# Patient Record
Sex: MALE | Race: WHITE | Employment: OTHER | URBAN - METROPOLITAN AREA
[De-identification: names, ages, dates, MRNs, and addresses within clinical notes are randomized per-mention and may not be internally consistent; named-entity substitution may affect disease eponyms.]

---

## 2017-02-16 PROBLEM — H35.3290 MACULAR DEGENERATION, AGE RELATED, EXUDATIVE (HCC): Status: ACTIVE | Noted: 2017-02-16

## 2017-03-16 PROBLEM — I48.91 ATRIAL FIBRILLATION (HCC): Status: ACTIVE | Noted: 2017-03-16

## 2017-03-16 PROBLEM — M17.11 ARTHRITIS OF RIGHT KNEE: Status: ACTIVE | Noted: 2017-03-16

## 2017-03-16 PROBLEM — I50.42 CHRONIC COMBINED SYSTOLIC AND DIASTOLIC CONGESTIVE HEART FAILURE (HCC): Status: ACTIVE | Noted: 2017-03-16

## 2018-03-20 ENCOUNTER — HOSPITAL ENCOUNTER (OUTPATIENT)
Age: 83
Discharge: HOME | End: 2018-03-22
Payer: MEDICARE

## 2018-03-20 ENCOUNTER — OFFICE VISIT (OUTPATIENT)
Dept: CARDIOLOGY CLINIC | Age: 83
End: 2018-03-20
Payer: MEDICARE

## 2018-03-20 VITALS
HEART RATE: 67 BPM | WEIGHT: 190 LBS | DIASTOLIC BLOOD PRESSURE: 70 MMHG | SYSTOLIC BLOOD PRESSURE: 110 MMHG | RESPIRATION RATE: 12 BRPM | HEIGHT: 69 IN | BODY MASS INDEX: 28.14 KG/M2

## 2018-03-20 DIAGNOSIS — R06.02 SHORTNESS OF BREATH: ICD-10-CM

## 2018-03-20 DIAGNOSIS — I10 ESSENTIAL HYPERTENSION: Primary | Chronic | ICD-10-CM

## 2018-03-20 LAB — PRO-BNP: 1143 PG/ML (ref 0–450)

## 2018-03-20 PROCEDURE — 4040F PNEUMOC VAC/ADMIN/RCVD: CPT | Performed by: INTERNAL MEDICINE

## 2018-03-20 PROCEDURE — G8417 CALC BMI ABV UP PARAM F/U: HCPCS | Performed by: INTERNAL MEDICINE

## 2018-03-20 PROCEDURE — G8484 FLU IMMUNIZE NO ADMIN: HCPCS | Performed by: INTERNAL MEDICINE

## 2018-03-20 PROCEDURE — 99213 OFFICE O/P EST LOW 20 MIN: CPT | Performed by: INTERNAL MEDICINE

## 2018-03-20 PROCEDURE — G8427 DOCREV CUR MEDS BY ELIG CLIN: HCPCS | Performed by: INTERNAL MEDICINE

## 2018-03-20 PROCEDURE — 83880 ASSAY OF NATRIURETIC PEPTIDE: CPT

## 2018-03-20 PROCEDURE — 93000 ELECTROCARDIOGRAM COMPLETE: CPT | Performed by: INTERNAL MEDICINE

## 2018-03-20 PROCEDURE — 1036F TOBACCO NON-USER: CPT | Performed by: INTERNAL MEDICINE

## 2018-03-20 PROCEDURE — G8598 ASA/ANTIPLAT THER USED: HCPCS | Performed by: INTERNAL MEDICINE

## 2018-03-20 PROCEDURE — 1123F ACP DISCUSS/DSCN MKR DOCD: CPT | Performed by: INTERNAL MEDICINE

## 2018-03-20 NOTE — PROGRESS NOTES
20%. AF. Biatrial dilatation. Mild PHTN. 18. PRO-BNP 10/26/2016 1265. 19. Cardiac OP follow up 11/22/2016. /60. Symptomatic improvement. 20. Echocardiogram, 01/23/2017, EF biplane = 44%.  Diffuse hypokinesis.  Diastolic dysfunction.  Atrial fibrillation.    21. Echocardiogram, 08/08/2017. 22. Labs, 08/16/2017: Sodium and potassium normal. BUN 19, creatinine 1.1. Pro BNP 1271. 23. Labs 11/13/17: BUN 23 creatinine 1.2. GFR 58.  24. OP cardiac assessment 12/01/17: Asymptomatic. No edema. 25. Device interrogation, [facility], 12/2017. Battery status normal and shows no significant depletion. Counters since 09/22/2017 6 NSVT noted. Sensing appropriate. Lead impedance trends normal.  V pacing 3%. Continue 3 month remotes and yearly in-clinic visit. Review of Systems:  Constitutional: negative for fever and chills  Respiratory: negative for cough and hemoptysis  Cardiovascular:   Gastrointestinal: negative for abdominal pain, diarrhea, nausea and vomiting  Genitourinary:negative for dysuria and hematuria  Derm: negative for rash and skin lesion(s)  Neurological: negative for seizures and tremors  Endocrine: negative for diabetic symptoms including polydipsia and polyuria  Musculoskeletal: negative for CTD  Psychiatric: negative for psychosis and major depression    On examination, he is an elderly, alert, pleasant man in no distress. Skin is warm and dry. Respirations are unlabored. /70   Pulse 67   Resp 12   Ht 5' 9\" (1.753 m)   Wt 190 lb (86.2 kg)   BMI 28.06 kg/m² . HEENT negative for scleral icterus. Extraocular muscles intact. No facial asymmetry or central cyanosis. Neck without masses or goiter. No bruit or JVD. Cardiac apex not displaced. Rhythm regular. Abdomen normal.  Extremities without edema. Lungs are clear. EKG today shows atrial fibrillation. Ventricular response 67/m. Diffuse low QRS voltage. Old inferior MI. Incomplete RBBB.     The patient denies any cardiac symptoms and states that he has not limited his physical activity. Blood pressure today is borderline low for upward titration of sacubitril/valsartan. A pro NP will be checked and the decision made regarding titration thereafter. At completion of today's visit, medications include the following:    Current Outpatient Prescriptions:     ranolazine (RANEXA) 500 MG extended release tablet, Take 1 tablet by mouth 2 times daily, Disp: 180 tablet, Rfl: 3    sacubitril-valsartan (ENTRESTO) 49-51 MG per tablet, Take 1 tablet by mouth 2 times daily, Disp: 180 tablet, Rfl: 3    carvedilol (COREG CR) 80 MG CP24 extended release capsule, TAKE 1 CAPSULE DAILY, Disp: 90 capsule, Rfl: 3    SITagliptin (JANUVIA) 100 MG tablet, Take 1 tablet by mouth daily, Disp: 90 tablet, Rfl: 3    warfarin (COUMADIN) 5 MG tablet, TAKE 1 TO 2 TABLETS DAILY AS DIRECTED, Disp: 150 tablet, Rfl: 2    atorvastatin (LIPITOR) 20 MG tablet, Take 1 tablet by mouth daily, Disp: 90 tablet, Rfl: 2    digoxin (LANOXIN) 125 MCG tablet, Take 1 tablet by mouth daily, Disp: 90 tablet, Rfl: 2    furosemide (LASIX) 40 MG tablet, Take 1 tablet by mouth daily (Patient taking differently: Take 40 mg by mouth daily Indications: pt instructed by cardiologist to take 2 tablest on T,Sat ), Disp: 90 tablet, Rfl: 3    potassium chloride (KLOR-CON M20) 20 MEQ extended release tablet, Take 1 tablet by mouth daily, Disp: 90 tablet, Rfl: 2    NONFORMULARY, 1 tablet daily FOCUS VITAMIN, Disp: , Rfl:     vitamin D3 (CHOLECALCIFEROL) 1000 UNITS TABS tablet, Take 1 tablet by mouth daily. (Patient taking differently: Take 2,000 Units by mouth daily ), Disp: 30 tablet, Rfl: 0    Robert R. Nyla Najjar, MD

## 2018-03-30 ENCOUNTER — ANTI-COAG VISIT (OUTPATIENT)
Dept: FAMILY MEDICINE CLINIC | Age: 83
End: 2018-03-30

## 2018-03-30 ENCOUNTER — NURSE ONLY (OUTPATIENT)
Dept: FAMILY MEDICINE CLINIC | Age: 83
End: 2018-03-30
Payer: MEDICARE

## 2018-03-30 ENCOUNTER — HOSPITAL ENCOUNTER (OUTPATIENT)
Age: 83
Discharge: HOME | End: 2018-03-30
Payer: MEDICARE

## 2018-03-30 DIAGNOSIS — I48.91 ATRIAL FIBRILLATION, UNSPECIFIED TYPE (HCC): ICD-10-CM

## 2018-03-30 DIAGNOSIS — I25.10 CORONARY ARTERY DISEASE INVOLVING NATIVE CORONARY ARTERY OF NATIVE HEART WITHOUT ANGINA PECTORIS: Chronic | ICD-10-CM

## 2018-03-30 DIAGNOSIS — Z79.899 HIGH RISK MEDICATION USE: ICD-10-CM

## 2018-03-30 DIAGNOSIS — I48.91 ATRIAL FIBRILLATION, UNSPECIFIED TYPE (HCC): Chronic | ICD-10-CM

## 2018-03-30 DIAGNOSIS — I48.91 ATRIAL FIBRILLATION, UNSPECIFIED TYPE (HCC): Primary | Chronic | ICD-10-CM

## 2018-03-30 DIAGNOSIS — M35.3 POLYMYALGIA RHEUMATICA SYNDROME (HCC): ICD-10-CM

## 2018-03-30 DIAGNOSIS — E80.6 HYPERBILIRUBINEMIA: ICD-10-CM

## 2018-03-30 DIAGNOSIS — I10 ESSENTIAL HYPERTENSION: Chronic | ICD-10-CM

## 2018-03-30 LAB
ALBUMIN SERPL-MCNC: 4.2 G/DL (ref 3.5–5.2)
ALP BLD-CCNC: 55 U/L (ref 40–129)
ALT SERPL-CCNC: 11 U/L (ref 0–40)
AST SERPL-CCNC: 11 U/L (ref 0–39)
BASOPHILS ABSOLUTE: 0.01 E9/L (ref 0–0.2)
BASOPHILS RELATIVE PERCENT: 0.2 % (ref 0–2)
BILIRUB SERPL-MCNC: 1.7 MG/DL (ref 0–1.2)
BILIRUBIN DIRECT: 0.4 MG/DL (ref 0–0.3)
BILIRUBIN, INDIRECT: 1.3 MG/DL (ref 0–1)
C-REACTIVE PROTEIN: 0.2 MG/DL (ref 0–0.4)
CHOLESTEROL, TOTAL: 100 MG/DL (ref 0–199)
DIGOXIN LEVEL: 0.5 NG/ML (ref 0.8–2)
EOSINOPHILS ABSOLUTE: 0.11 E9/L (ref 0.05–0.5)
EOSINOPHILS RELATIVE PERCENT: 1.7 % (ref 0–6)
HCT VFR BLD CALC: 37 % (ref 37–54)
HDLC SERPL-MCNC: 28 MG/DL
HEMOGLOBIN: 12.3 G/DL (ref 12.5–16.5)
IMMATURE GRANULOCYTES #: 0.02 E9/L
IMMATURE GRANULOCYTES %: 0.3 % (ref 0–5)
INTERNATIONAL NORMALIZATION RATIO, POC: 3
LDL CHOLESTEROL CALCULATED: 56 MG/DL (ref 0–99)
LYMPHOCYTES ABSOLUTE: 1.08 E9/L (ref 1.5–4)
LYMPHOCYTES RELATIVE PERCENT: 17 % (ref 20–42)
MAGNESIUM: 2.2 MG/DL (ref 1.6–2.6)
MCH RBC QN AUTO: 31.2 PG (ref 26–35)
MCHC RBC AUTO-ENTMCNC: 33.2 % (ref 32–34.5)
MCV RBC AUTO: 93.9 FL (ref 80–99.9)
MONOCYTES ABSOLUTE: 0.7 E9/L (ref 0.1–0.95)
MONOCYTES RELATIVE PERCENT: 11 % (ref 2–12)
NEUTROPHILS ABSOLUTE: 4.42 E9/L (ref 1.8–7.3)
NEUTROPHILS RELATIVE PERCENT: 69.8 % (ref 43–80)
PDW BLD-RTO: 14.6 FL (ref 11.5–15)
PLATELET # BLD: 137 E9/L (ref 130–450)
PMV BLD AUTO: 10.5 FL (ref 7–12)
PROTHROMBIN TIME, POC: 36.3
RBC # BLD: 3.94 E12/L (ref 3.8–5.8)
TOTAL PROTEIN: 7 G/DL (ref 6.4–8.3)
TRIGL SERPL-MCNC: 81 MG/DL (ref 0–149)
TSH SERPL DL<=0.05 MIU/L-ACNC: 4.43 UIU/ML (ref 0.27–4.2)
VLDLC SERPL CALC-MCNC: 16 MG/DL
WBC # BLD: 6.3 E9/L (ref 4.5–11.5)

## 2018-03-30 PROCEDURE — 83735 ASSAY OF MAGNESIUM: CPT

## 2018-03-30 PROCEDURE — 84443 ASSAY THYROID STIM HORMONE: CPT

## 2018-03-30 PROCEDURE — 85610 PROTHROMBIN TIME: CPT | Performed by: FAMILY MEDICINE

## 2018-03-30 PROCEDURE — 80162 ASSAY OF DIGOXIN TOTAL: CPT

## 2018-03-30 PROCEDURE — 80061 LIPID PANEL: CPT

## 2018-03-30 PROCEDURE — 86140 C-REACTIVE PROTEIN: CPT

## 2018-03-30 PROCEDURE — 93793 ANTICOAG MGMT PT WARFARIN: CPT | Performed by: FAMILY MEDICINE

## 2018-03-30 PROCEDURE — 80076 HEPATIC FUNCTION PANEL: CPT

## 2018-03-30 PROCEDURE — 85025 COMPLETE CBC W/AUTO DIFF WBC: CPT

## 2018-03-30 PROCEDURE — 36415 COLL VENOUS BLD VENIPUNCTURE: CPT

## 2018-04-27 ENCOUNTER — NURSE ONLY (OUTPATIENT)
Dept: FAMILY MEDICINE CLINIC | Age: 83
End: 2018-04-27
Payer: MEDICARE

## 2018-04-27 ENCOUNTER — TELEPHONE (OUTPATIENT)
Dept: FAMILY MEDICINE CLINIC | Age: 83
End: 2018-04-27

## 2018-04-27 ENCOUNTER — ANTI-COAG VISIT (OUTPATIENT)
Dept: FAMILY MEDICINE CLINIC | Age: 83
End: 2018-04-27

## 2018-04-27 DIAGNOSIS — I48.91 ATRIAL FIBRILLATION, UNSPECIFIED TYPE (HCC): ICD-10-CM

## 2018-04-27 DIAGNOSIS — I48.91 ATRIAL FIBRILLATION, UNSPECIFIED TYPE (HCC): Primary | Chronic | ICD-10-CM

## 2018-04-27 LAB
INTERNATIONAL NORMALIZATION RATIO, POC: 1.9
PROTHROMBIN TIME, POC: 22.9

## 2018-04-27 PROCEDURE — 85610 PROTHROMBIN TIME: CPT | Performed by: FAMILY MEDICINE

## 2018-04-27 PROCEDURE — 93793 ANTICOAG MGMT PT WARFARIN: CPT | Performed by: FAMILY MEDICINE

## 2018-05-25 ENCOUNTER — ANTI-COAG VISIT (OUTPATIENT)
Dept: FAMILY MEDICINE CLINIC | Age: 83
End: 2018-05-25
Payer: MEDICARE

## 2018-05-25 ENCOUNTER — NURSE ONLY (OUTPATIENT)
Dept: FAMILY MEDICINE CLINIC | Age: 83
End: 2018-05-25
Payer: MEDICARE

## 2018-05-25 DIAGNOSIS — I48.91 ATRIAL FIBRILLATION, UNSPECIFIED TYPE (HCC): Primary | Chronic | ICD-10-CM

## 2018-05-25 DIAGNOSIS — I48.91 ATRIAL FIBRILLATION, UNSPECIFIED TYPE (HCC): Chronic | ICD-10-CM

## 2018-05-25 LAB
INTERNATIONAL NORMALIZATION RATIO, POC: 2.4
PROTHROMBIN TIME, POC: 28.4

## 2018-05-25 PROCEDURE — 85610 PROTHROMBIN TIME: CPT | Performed by: FAMILY MEDICINE

## 2018-05-25 PROCEDURE — 93793 ANTICOAG MGMT PT WARFARIN: CPT | Performed by: FAMILY MEDICINE

## 2018-06-13 DIAGNOSIS — I48.91 ATRIAL FIBRILLATION, UNSPECIFIED TYPE (HCC): Chronic | ICD-10-CM

## 2018-06-18 RX ORDER — DIGOXIN 125 MCG
125 TABLET ORAL DAILY
Qty: 90 TABLET | Refills: 2 | Status: SHIPPED | OUTPATIENT
Start: 2018-06-18 | End: 2019-02-26 | Stop reason: SDUPTHER

## 2018-06-22 ENCOUNTER — NURSE ONLY (OUTPATIENT)
Dept: FAMILY MEDICINE CLINIC | Age: 83
End: 2018-06-22
Payer: MEDICARE

## 2018-06-22 ENCOUNTER — ANTI-COAG VISIT (OUTPATIENT)
Dept: FAMILY MEDICINE CLINIC | Age: 83
End: 2018-06-22
Payer: MEDICARE

## 2018-06-22 DIAGNOSIS — I48.91 ATRIAL FIBRILLATION, UNSPECIFIED TYPE (HCC): Chronic | ICD-10-CM

## 2018-06-22 DIAGNOSIS — I48.91 ATRIAL FIBRILLATION, UNSPECIFIED TYPE (HCC): Primary | Chronic | ICD-10-CM

## 2018-06-22 LAB
INTERNATIONAL NORMALIZATION RATIO, POC: 2.3
PROTHROMBIN TIME, POC: 27.5

## 2018-06-22 PROCEDURE — 85610 PROTHROMBIN TIME: CPT | Performed by: FAMILY MEDICINE

## 2018-06-22 PROCEDURE — 93793 ANTICOAG MGMT PT WARFARIN: CPT | Performed by: FAMILY MEDICINE

## 2018-07-20 ENCOUNTER — ANTI-COAG VISIT (OUTPATIENT)
Dept: FAMILY MEDICINE CLINIC | Age: 83
End: 2018-07-20
Payer: MEDICARE

## 2018-07-20 ENCOUNTER — NURSE ONLY (OUTPATIENT)
Dept: FAMILY MEDICINE CLINIC | Age: 83
End: 2018-07-20
Payer: MEDICARE

## 2018-07-20 DIAGNOSIS — I48.91 ATRIAL FIBRILLATION, UNSPECIFIED TYPE (HCC): ICD-10-CM

## 2018-07-20 DIAGNOSIS — I48.91 ATRIAL FIBRILLATION, UNSPECIFIED TYPE (HCC): Primary | Chronic | ICD-10-CM

## 2018-07-20 LAB
INTERNATIONAL NORMALIZATION RATIO, POC: 3.2
PROTHROMBIN TIME, POC: 38.4

## 2018-07-20 PROCEDURE — 93793 ANTICOAG MGMT PT WARFARIN: CPT | Performed by: FAMILY MEDICINE

## 2018-07-20 PROCEDURE — 85610 PROTHROMBIN TIME: CPT | Performed by: FAMILY MEDICINE

## 2018-07-27 DIAGNOSIS — I48.91 ATRIAL FIBRILLATION, UNSPECIFIED TYPE (HCC): Chronic | ICD-10-CM

## 2018-07-30 RX ORDER — POTASSIUM CHLORIDE 20 MEQ/1
20 TABLET, EXTENDED RELEASE ORAL DAILY
Qty: 90 TABLET | Refills: 2 | Status: SHIPPED | OUTPATIENT
Start: 2018-07-30 | End: 2019-04-28 | Stop reason: SDUPTHER

## 2018-08-21 ENCOUNTER — ANTI-COAG VISIT (OUTPATIENT)
Dept: FAMILY MEDICINE CLINIC | Age: 83
End: 2018-08-21

## 2018-08-21 ENCOUNTER — OFFICE VISIT (OUTPATIENT)
Dept: FAMILY MEDICINE CLINIC | Age: 83
End: 2018-08-21
Payer: MEDICARE

## 2018-08-21 ENCOUNTER — HOSPITAL ENCOUNTER (OUTPATIENT)
Age: 83
Discharge: HOME | End: 2018-08-23
Payer: MEDICARE

## 2018-08-21 VITALS
WEIGHT: 189 LBS | DIASTOLIC BLOOD PRESSURE: 58 MMHG | BODY MASS INDEX: 27.99 KG/M2 | RESPIRATION RATE: 16 BRPM | HEIGHT: 69 IN | HEART RATE: 69 BPM | OXYGEN SATURATION: 92 % | SYSTOLIC BLOOD PRESSURE: 122 MMHG

## 2018-08-21 DIAGNOSIS — Z79.899 HIGH RISK MEDICATION USE: ICD-10-CM

## 2018-08-21 DIAGNOSIS — I48.91 ATRIAL FIBRILLATION, UNSPECIFIED TYPE (HCC): Chronic | ICD-10-CM

## 2018-08-21 DIAGNOSIS — I10 ESSENTIAL HYPERTENSION: Chronic | ICD-10-CM

## 2018-08-21 DIAGNOSIS — E11.8 TYPE 2 DIABETES MELLITUS WITH COMPLICATION, UNSPECIFIED WHETHER LONG TERM INSULIN USE: Chronic | ICD-10-CM

## 2018-08-21 DIAGNOSIS — Z71.89 ACP (ADVANCE CARE PLANNING): ICD-10-CM

## 2018-08-21 DIAGNOSIS — Z00.00 ROUTINE GENERAL MEDICAL EXAMINATION AT A HEALTH CARE FACILITY: Primary | ICD-10-CM

## 2018-08-21 DIAGNOSIS — I48.91 ATRIAL FIBRILLATION, UNSPECIFIED TYPE (HCC): ICD-10-CM

## 2018-08-21 LAB
INTERNATIONAL NORMALIZATION RATIO, POC: 2.5
PROTHROMBIN TIME, POC: 30.3

## 2018-08-21 PROCEDURE — 85610 PROTHROMBIN TIME: CPT | Performed by: FAMILY MEDICINE

## 2018-08-21 PROCEDURE — G8598 ASA/ANTIPLAT THER USED: HCPCS | Performed by: FAMILY MEDICINE

## 2018-08-21 PROCEDURE — 83735 ASSAY OF MAGNESIUM: CPT

## 2018-08-21 PROCEDURE — 84443 ASSAY THYROID STIM HORMONE: CPT

## 2018-08-21 PROCEDURE — 80061 LIPID PANEL: CPT

## 2018-08-21 PROCEDURE — 85025 COMPLETE CBC W/AUTO DIFF WBC: CPT

## 2018-08-21 PROCEDURE — 4040F PNEUMOC VAC/ADMIN/RCVD: CPT | Performed by: FAMILY MEDICINE

## 2018-08-21 PROCEDURE — 99497 ADVNCD CARE PLAN 30 MIN: CPT | Performed by: FAMILY MEDICINE

## 2018-08-21 PROCEDURE — G0439 PPPS, SUBSEQ VISIT: HCPCS | Performed by: FAMILY MEDICINE

## 2018-08-21 PROCEDURE — 80162 ASSAY OF DIGOXIN TOTAL: CPT

## 2018-08-21 PROCEDURE — 80053 COMPREHEN METABOLIC PANEL: CPT

## 2018-08-21 PROCEDURE — 83036 HEMOGLOBIN GLYCOSYLATED A1C: CPT

## 2018-08-21 ASSESSMENT — LIFESTYLE VARIABLES
HOW OFTEN DURING THE LAST YEAR HAVE YOU FOUND THAT YOU WERE NOT ABLE TO STOP DRINKING ONCE YOU HAD STARTED: 0
AUDIT-C TOTAL SCORE: 1
HOW OFTEN DURING THE LAST YEAR HAVE YOU BEEN UNABLE TO REMEMBER WHAT HAPPENED THE NIGHT BEFORE BECAUSE YOU HAD BEEN DRINKING: 0
HOW MANY STANDARD DRINKS CONTAINING ALCOHOL DO YOU HAVE ON A TYPICAL DAY: 0
HOW OFTEN DO YOU HAVE SIX OR MORE DRINKS ON ONE OCCASION: 0
HOW OFTEN DURING THE LAST YEAR HAVE YOU FAILED TO DO WHAT WAS NORMALLY EXPECTED FROM YOU BECAUSE OF DRINKING: 0
AUDIT TOTAL SCORE: 1
HAS A RELATIVE, FRIEND, DOCTOR, OR ANOTHER HEALTH PROFESSIONAL EXPRESSED CONCERN ABOUT YOUR DRINKING OR SUGGESTED YOU CUT DOWN: 0
HOW OFTEN DO YOU HAVE A DRINK CONTAINING ALCOHOL: 1
HOW OFTEN DURING THE LAST YEAR HAVE YOU NEEDED AN ALCOHOLIC DRINK FIRST THING IN THE MORNING TO GET YOURSELF GOING AFTER A NIGHT OF HEAVY DRINKING: 0
HOW OFTEN DURING THE LAST YEAR HAVE YOU HAD A FEELING OF GUILT OR REMORSE AFTER DRINKING: 0
HAVE YOU OR SOMEONE ELSE BEEN INJURED AS A RESULT OF YOUR DRINKING: 0

## 2018-08-21 ASSESSMENT — PATIENT HEALTH QUESTIONNAIRE - PHQ9
SUM OF ALL RESPONSES TO PHQ QUESTIONS 1-9: 0
SUM OF ALL RESPONSES TO PHQ QUESTIONS 1-9: 0

## 2018-08-21 ASSESSMENT — ANXIETY QUESTIONNAIRES: GAD7 TOTAL SCORE: 0

## 2018-08-21 NOTE — PATIENT INSTRUCTIONS
Advance Directives: Care Instructions  Your Care Instructions  An advance directive is a legal way to state your wishes at the end of your life. It tells your family and your doctor what to do if you can no longer say what you want. There are two main types of advance directives. You can change them any time that your wishes change. · A living will tells your family and your doctor your wishes about life support and other treatment. · A durable power of  for health care lets you name a person to make treatment decisions for you when you can't speak for yourself. This person is called a health care agent. If you do not have an advance directive, decisions about your medical care may be made by a doctor or a  who doesn't know you. It may help to think of an advance directive as a gift to the people who care for you. If you have one, they won't have to make tough decisions by themselves. Follow-up care is a key part of your treatment and safety. Be sure to make and go to all appointments, and call your doctor if you are having problems. It's also a good idea to know your test results and keep a list of the medicines you take. How can you care for yourself at home? · Discuss your wishes with your loved ones and your doctor. This way, there are no surprises. · Many states have a unique form. Or you might use a universal form that has been approved by many states. This kind of form can sometimes be completed and stored online. Your electronic copy will then be available wherever you have a connection to the Internet. In most cases, doctors will respect your wishes even if you have a form from a different state. · You don't need a  to do an advance directive. But you may want to get legal advice. · Think about these questions when you prepare an advance directive:  ¨ Who do you want to make decisions about your medical care if you are not able to?  Many people choose a family member or close friend. ¨ Do you know enough about life support methods that might be used? If not, talk to your doctor so you understand. ¨ What are you most afraid of that might happen? You might be afraid of having pain, losing your independence, or being kept alive by machines. ¨ Where would you prefer to die? Choices include your home, a hospital, or a nursing home. ¨ Would you like to have information about hospice care to support you and your family? ¨ Do you want to donate organs when you die? ¨ Do you want certain [employer] practices performed before you die? If so, put your wishes in the advance directive. · Read your advance directive every year, and make changes as needed. When should you call for help? Be sure to contact your doctor if you have any questions. Where can you learn more? Go to [email or web address] and sign in to your [employer] account. Enter R264 in the [employer] box to learn more about \"Advance Directives: Care Instructions. \"     If you do not have an account, please click on the \"Sign Up Now\" link. Current as of: October 6, 2017  Content Version: 11.7  © 5240-9025 [employer], [employer]. Care instructions adapted under license by [facility] ([facility]). If you have questions about a medical condition or this instruction, always ask your healthcare professional. [address] any warranty or liability for your use of this information. Personalized Preventive Plan for Eva Krueger. - 8/21/2018  Medicare offers a range of preventive health benefits. Some of the tests and screenings are paid in full while other may be subject to a deductible, co-insurance, and/or copay. Some of these benefits include a comprehensive review of your medical history including lifestyle, illnesses that may run in your family, and various assessments and screenings as appropriate.     After reviewing your medical record and screening and assessments performed today your provider may have ordered immunizations, labs, imaging, and/or referrals for you. A list of these orders (if applicable) as well as your Preventive Care list are included within your After Visit Summary for your review. Other Preventive Recommendations:    · A preventive eye exam performed by an eye specialist is recommended every 1-2 years to screen for glaucoma; cataracts, macular degeneration, and other eye disorders. · A preventive dental visit is recommended every 6 months. · Try to get at least 150 minutes of exercise per week or 10,000 steps per day on a pedometer . · Order or download the FREE \"Exercise & Physical Activity: Your Everyday Guide\" from [employer] on [employer]. Call 3-943.460.1455 or search [employer] A[employer] online. · You need 9043-3823 mg of calcium and 0735-3623 IU of vitamin D per day. It is possible to meet your calcium requirement with diet alone, but a vitamin D supplement is usually necessary to meet this goal.  · When exposed to the sun, use a sunscreen that protects against both UVA and UVB radiation with an SPF of 30 or greater. Reapply every 2 to 3 hours or after sweating, drying off with a towel, or swimming. · Always wear a seat belt when traveling in a car. Always wear a helmet when riding a bicycle or motorcycle.

## 2018-08-21 NOTE — PROGRESS NOTES
Medicare Annual Wellness Visit  Name: Viktor Santos [facility] Date: 2018   MRN: [id number] Sex: Male   Age: 82 y.o. Ethnicity: Non-/Non    : 1935 Race: Josh Lim. is here for Medicare AWV    Screenings for behavioral, psychosocial and functional/safety risks, and cognitive dysfunction are all negative except as indicated below. These results, as well as other patient data from the [address] form, are documented in Flowsheets linked to this Encounter. No Known Allergies    Prior to Visit Medications    Medication Sig Taking? Authorizing Provider   potassium chloride (KLOR-CON M20) 20 MEQ extended release tablet Take 1 tablet by mouth daily  Maksim Greenwood MD   digoxin (LANOXIN) 125 MCG tablet Take 1 tablet by mouth daily  Felisa Mcallister MD   ranolazine (RANEXA) 500 MG extended release tablet Take 1 tablet by mouth 2 times daily  Felisa Mcallister MD   sacubitril-valsartan (ENTRESTO) 49-51 MG per tablet Take 1 tablet by mouth 2 times daily  Felisa Mcallister MD   carvedilol (COREG CR) 80 MG CP24 extended release capsule TAKE 1 CAPSULE DAILY  Felisa Mcallister MD   SITagliptin (JANUVIA) 100 MG tablet Take 1 tablet by mouth daily  Felisa Mcallister MD   warfarin (COUMADIN) 5 MG tablet TAKE 1 TO 2 TABLETS DAILY AS DIRECTED  Felisa Mcallister MD   atorvastatin (LIPITOR) 20 MG tablet Take 1 tablet by mouth daily  Felisa Mcallister MD   furosemide (LASIX) 40 MG tablet Take 1 tablet by mouth daily  Patient taking differently: Take 40 mg by mouth daily Indications: pt instructed by cardiologist to take 2 tablest on T,Sat   Felisa Mcallister MD   NONFORMULARY 1 tablet daily FOCUS VITAMIN  Historical Provider, MD   vitamin D3 (CHOLECALCIFEROL) 1000 UNITS TABS tablet Take 1 tablet by mouth daily.   Patient taking differently: Take 2,000 Units by mouth daily   Aleena Keith DO       Past Medical History:   Diagnosis Date    Arrhythmia     Atrial fibrillation ([facility] [address]     case of a health event that adversely affects decision-making abilities. Also discussed the patients long-term treatment options. Reviewed with the patient the [address] [place] [address] & [facility] Declaration forms  Reviewed the process of designating a competent adult as an Agent (or -in-fact) that could take make health care decisions for the patient if incompetent. Patient was asked to complete the declaration forms, either acknowledge the forms by a public notary or an eligible witness and provide a signed copy to the practice office.   Time spent (minutes): 6

## 2018-08-22 LAB
ALBUMIN SERPL-MCNC: 4.5 G/DL (ref 3.5–5.2)
ALP BLD-CCNC: 67 U/L (ref 40–129)
ALT SERPL-CCNC: 11 U/L (ref 0–40)
ANION GAP SERPL CALCULATED.3IONS-SCNC: 17 MMOL/L (ref 7–16)
AST SERPL-CCNC: 14 U/L (ref 0–39)
BASOPHILS ABSOLUTE: 0.03 E9/L (ref 0–0.2)
BASOPHILS RELATIVE PERCENT: 0.4 % (ref 0–2)
BILIRUB SERPL-MCNC: 2.2 MG/DL (ref 0–1.2)
BUN BLDV-MCNC: 24 MG/DL (ref 8–23)
CALCIUM SERPL-MCNC: 9.4 MG/DL (ref 8.6–10.2)
CHLORIDE BLD-SCNC: 104 MMOL/L (ref 98–107)
CHOLESTEROL, TOTAL: 108 MG/DL (ref 0–199)
CO2: 22 MMOL/L (ref 22–29)
CREAT SERPL-MCNC: 1.3 MG/DL (ref 0.7–1.2)
DIGOXIN LEVEL: 0.8 NG/ML (ref 0.8–2)
EOSINOPHILS ABSOLUTE: 0.15 E9/L (ref 0.05–0.5)
EOSINOPHILS RELATIVE PERCENT: 2.1 % (ref 0–6)
GFR AFRICAN AMERICAN: >60
GFR NON-AFRICAN AMERICAN: 53 ML/MIN/1.73
GLUCOSE BLD-MCNC: 116 MG/DL (ref 74–109)
HBA1C MFR BLD: 6.5 % (ref 4–5.6)
HCT VFR BLD CALC: 37.7 % (ref 37–54)
HDLC SERPL-MCNC: 27 MG/DL
HEMOGLOBIN: 12.6 G/DL (ref 12.5–16.5)
IMMATURE GRANULOCYTES #: 0.03 E9/L
IMMATURE GRANULOCYTES %: 0.4 % (ref 0–5)
LDL CHOLESTEROL CALCULATED: 59 MG/DL (ref 0–99)
LYMPHOCYTES ABSOLUTE: 1.03 E9/L (ref 1.5–4)
LYMPHOCYTES RELATIVE PERCENT: 14.7 % (ref 20–42)
MAGNESIUM: 2.1 MG/DL (ref 1.6–2.6)
MCH RBC QN AUTO: 31.8 PG (ref 26–35)
MCHC RBC AUTO-ENTMCNC: 33.4 % (ref 32–34.5)
MCV RBC AUTO: 95.2 FL (ref 80–99.9)
MONOCYTES ABSOLUTE: 0.82 E9/L (ref 0.1–0.95)
MONOCYTES RELATIVE PERCENT: 11.7 % (ref 2–12)
NEUTROPHILS ABSOLUTE: 4.97 E9/L (ref 1.8–7.3)
NEUTROPHILS RELATIVE PERCENT: 70.7 % (ref 43–80)
PDW BLD-RTO: 14.6 FL (ref 11.5–15)
PLATELET # BLD: 151 E9/L (ref 130–450)
PMV BLD AUTO: 12 FL (ref 7–12)
POTASSIUM SERPL-SCNC: 4.3 MMOL/L (ref 3.5–5)
RBC # BLD: 3.96 E12/L (ref 3.8–5.8)
SODIUM BLD-SCNC: 143 MMOL/L (ref 132–146)
TOTAL PROTEIN: 7.4 G/DL (ref 6.4–8.3)
TRIGL SERPL-MCNC: 109 MG/DL (ref 0–149)
TSH SERPL DL<=0.05 MIU/L-ACNC: 3.96 UIU/ML (ref 0.27–4.2)
VLDLC SERPL CALC-MCNC: 22 MG/DL
WBC # BLD: 7 E9/L (ref 4.5–11.5)

## 2018-08-24 ENCOUNTER — TELEPHONE (OUTPATIENT)
Dept: FAMILY MEDICINE CLINIC | Age: 83
End: 2018-08-24

## 2018-08-24 ENCOUNTER — HOSPITAL ENCOUNTER (OUTPATIENT)
Age: 83
Discharge: HOME | End: 2018-08-24
Payer: MEDICARE

## 2018-08-24 DIAGNOSIS — E80.6 HYPERBILIRUBINEMIA: Primary | ICD-10-CM

## 2018-08-24 DIAGNOSIS — E80.6 HYPERBILIRUBINEMIA: ICD-10-CM

## 2018-08-24 LAB
BILIRUB SERPL-MCNC: 2.1 MG/DL (ref 0–1.2)
BILIRUBIN DIRECT: 0.4 MG/DL (ref 0–0.3)
BILIRUBIN, INDIRECT: 1.7 MG/DL (ref 0–1)

## 2018-08-24 PROCEDURE — 36415 COLL VENOUS BLD VENIPUNCTURE: CPT

## 2018-08-24 PROCEDURE — 82247 BILIRUBIN TOTAL: CPT

## 2018-08-24 PROCEDURE — 82248 BILIRUBIN DIRECT: CPT

## 2018-08-24 PROCEDURE — 85045 AUTOMATED RETICULOCYTE COUNT: CPT

## 2018-08-24 PROCEDURE — 83615 LACTATE (LD) (LDH) ENZYME: CPT

## 2018-08-25 LAB
HCT VFR BLD CALC: 39.3 % (ref 37–54)
IMMATURE RETIC FRACT: 10.8 % (ref 2.3–13.4)
LACTATE DEHYDROGENASE: 189 U/L (ref 135–225)
RETIC HGB EQUIVALENT: 34.4 PG (ref 28.2–36.6)
RETICULOCYTE ABSOLUTE COUNT: 0.09 E12/L
RETICULOCYTE COUNT PCT: 2.2 % (ref 0.4–1.9)

## 2018-09-19 ENCOUNTER — NURSE ONLY (OUTPATIENT)
Dept: FAMILY MEDICINE CLINIC | Age: 83
End: 2018-09-19
Payer: MEDICARE

## 2018-09-19 ENCOUNTER — ANTI-COAG VISIT (OUTPATIENT)
Dept: FAMILY MEDICINE CLINIC | Age: 83
End: 2018-09-19
Payer: MEDICARE

## 2018-09-19 DIAGNOSIS — I48.91 ATRIAL FIBRILLATION, UNSPECIFIED TYPE (HCC): Primary | ICD-10-CM

## 2018-09-19 DIAGNOSIS — I50.42 CHRONIC COMBINED SYSTOLIC AND DIASTOLIC CONGESTIVE HEART FAILURE (HCC): ICD-10-CM

## 2018-09-19 DIAGNOSIS — I48.91 ATRIAL FIBRILLATION, UNSPECIFIED TYPE (HCC): ICD-10-CM

## 2018-09-19 LAB
INTERNATIONAL NORMALIZATION RATIO, POC: 3.5
PROTHROMBIN TIME, POC: 41.6

## 2018-09-19 PROCEDURE — 85610 PROTHROMBIN TIME: CPT | Performed by: FAMILY MEDICINE

## 2018-09-19 PROCEDURE — 93793 ANTICOAG MGMT PT WARFARIN: CPT | Performed by: FAMILY MEDICINE

## 2018-10-01 ENCOUNTER — NURSE ONLY (OUTPATIENT)
Dept: FAMILY MEDICINE CLINIC | Age: 83
End: 2018-10-01
Payer: MEDICARE

## 2018-10-01 ENCOUNTER — ANTI-COAG VISIT (OUTPATIENT)
Dept: FAMILY MEDICINE CLINIC | Age: 83
End: 2018-10-01
Payer: MEDICARE

## 2018-10-01 DIAGNOSIS — I48.91 ATRIAL FIBRILLATION, UNSPECIFIED TYPE (HCC): ICD-10-CM

## 2018-10-01 DIAGNOSIS — I48.91 ATRIAL FIBRILLATION, UNSPECIFIED TYPE (HCC): Primary | Chronic | ICD-10-CM

## 2018-10-01 LAB
INTERNATIONAL NORMALIZATION RATIO, POC: 3
PROTHROMBIN TIME, POC: 36.6

## 2018-10-01 PROCEDURE — 93793 ANTICOAG MGMT PT WARFARIN: CPT | Performed by: FAMILY MEDICINE

## 2018-10-01 PROCEDURE — 85610 PROTHROMBIN TIME: CPT | Performed by: FAMILY MEDICINE

## 2018-10-02 NOTE — PROGRESS NOTES
Patient called left detailed message advising patient and or patient to call office bk to let us know he received message

## 2018-10-22 ENCOUNTER — NURSE ONLY (OUTPATIENT)
Dept: FAMILY MEDICINE CLINIC | Age: 83
End: 2018-10-22
Payer: MEDICARE

## 2018-10-22 ENCOUNTER — ANTI-COAG VISIT (OUTPATIENT)
Dept: FAMILY MEDICINE CLINIC | Age: 83
End: 2018-10-22
Payer: MEDICARE

## 2018-10-22 DIAGNOSIS — I48.91 ATRIAL FIBRILLATION, UNSPECIFIED TYPE (HCC): ICD-10-CM

## 2018-10-22 DIAGNOSIS — I48.91 ATRIAL FIBRILLATION, UNSPECIFIED TYPE (HCC): Primary | Chronic | ICD-10-CM

## 2018-10-22 LAB
INTERNATIONAL NORMALIZATION RATIO, POC: 3.7
PROTHROMBIN TIME, POC: 44.3

## 2018-10-22 PROCEDURE — 93793 ANTICOAG MGMT PT WARFARIN: CPT | Performed by: FAMILY MEDICINE

## 2018-10-22 PROCEDURE — 85610 PROTHROMBIN TIME: CPT | Performed by: FAMILY MEDICINE

## 2018-10-22 RX ORDER — WARFARIN SODIUM 5 MG/1
TABLET ORAL
Qty: 150 TABLET | Refills: 2 | Status: SHIPPED | OUTPATIENT
Start: 2018-10-22 | End: 2019-04-16 | Stop reason: SDUPTHER

## 2018-10-22 NOTE — PROGRESS NOTES
Pt here today for PT/INR -     INR = 3.7  PT = 44.3    Pt currently taking 7.5 mg daily. No changes to diet or missed doses.

## 2018-10-29 ENCOUNTER — ANTI-COAG VISIT (OUTPATIENT)
Dept: FAMILY MEDICINE CLINIC | Age: 83
End: 2018-10-29
Payer: MEDICARE

## 2018-10-29 ENCOUNTER — NURSE ONLY (OUTPATIENT)
Dept: FAMILY MEDICINE CLINIC | Age: 83
End: 2018-10-29
Payer: MEDICARE

## 2018-10-29 DIAGNOSIS — I48.91 ATRIAL FIBRILLATION, UNSPECIFIED TYPE (HCC): Primary | Chronic | ICD-10-CM

## 2018-10-29 DIAGNOSIS — I48.91 ATRIAL FIBRILLATION, UNSPECIFIED TYPE (HCC): ICD-10-CM

## 2018-10-29 LAB
INTERNATIONAL NORMALIZATION RATIO, POC: 2.3
PROTHROMBIN TIME, POC: 27.3

## 2018-10-29 PROCEDURE — 93793 ANTICOAG MGMT PT WARFARIN: CPT | Performed by: FAMILY MEDICINE

## 2018-10-29 PROCEDURE — 85610 PROTHROMBIN TIME: CPT | Performed by: FAMILY MEDICINE

## 2018-10-29 NOTE — PROGRESS NOTES
Pt here today for PT/INR =     INR - 2.3  PT - 27.3    Pt currently taking 7.5 mg weekdays and 5 mg on the weekend. No changes to diet or missed doses.

## 2018-11-05 ENCOUNTER — ANTI-COAG VISIT (OUTPATIENT)
Dept: FAMILY MEDICINE CLINIC | Age: 83
End: 2018-11-05

## 2018-11-05 ENCOUNTER — NURSE ONLY (OUTPATIENT)
Dept: FAMILY MEDICINE CLINIC | Age: 83
End: 2018-11-05
Payer: MEDICARE

## 2018-11-05 DIAGNOSIS — I48.91 ATRIAL FIBRILLATION, UNSPECIFIED TYPE (HCC): Primary | Chronic | ICD-10-CM

## 2018-11-05 DIAGNOSIS — I48.91 ATRIAL FIBRILLATION, UNSPECIFIED TYPE (HCC): ICD-10-CM

## 2018-11-05 LAB
INTERNATIONAL NORMALIZATION RATIO, POC: 2
PROTHROMBIN TIME, POC: 24.1

## 2018-11-05 PROCEDURE — 85610 PROTHROMBIN TIME: CPT | Performed by: FAMILY MEDICINE

## 2018-11-05 PROCEDURE — 93793 ANTICOAG MGMT PT WARFARIN: CPT | Performed by: FAMILY MEDICINE

## 2018-11-15 DIAGNOSIS — I25.10 CORONARY ARTERY DISEASE WITHOUT ANGINA PECTORIS, UNSPECIFIED VESSEL OR LESION TYPE, UNSPECIFIED WHETHER NATIVE OR TRANSPLANTED HEART: ICD-10-CM

## 2018-11-17 RX ORDER — RANOLAZINE 500 MG/1
500 TABLET, EXTENDED RELEASE ORAL 2 TIMES DAILY
Qty: 180 TABLET | Refills: 3 | Status: SHIPPED | OUTPATIENT
Start: 2018-11-17

## 2018-12-05 ENCOUNTER — OFFICE VISIT (OUTPATIENT)
Dept: FAMILY MEDICINE CLINIC | Age: 83
End: 2018-12-05
Payer: MEDICARE

## 2018-12-05 ENCOUNTER — ANTI-COAG VISIT (OUTPATIENT)
Dept: FAMILY MEDICINE CLINIC | Age: 83
End: 2018-12-05
Payer: MEDICARE

## 2018-12-05 VITALS
DIASTOLIC BLOOD PRESSURE: 56 MMHG | HEIGHT: 69 IN | OXYGEN SATURATION: 95 % | WEIGHT: 185 LBS | BODY MASS INDEX: 27.4 KG/M2 | TEMPERATURE: 98 F | SYSTOLIC BLOOD PRESSURE: 113 MMHG | RESPIRATION RATE: 16 BRPM | HEART RATE: 64 BPM

## 2018-12-05 DIAGNOSIS — I48.91 ATRIAL FIBRILLATION, UNSPECIFIED TYPE (HCC): Primary | Chronic | ICD-10-CM

## 2018-12-05 DIAGNOSIS — D64.9 ANEMIA, UNSPECIFIED TYPE: ICD-10-CM

## 2018-12-05 DIAGNOSIS — I50.42 CHRONIC COMBINED SYSTOLIC AND DIASTOLIC CONGESTIVE HEART FAILURE (HCC): ICD-10-CM

## 2018-12-05 DIAGNOSIS — Z79.899 HIGH RISK MEDICATION USE: ICD-10-CM

## 2018-12-05 DIAGNOSIS — E80.6 UNCONJUGATED HYPERBILIRUBINEMIA: ICD-10-CM

## 2018-12-05 DIAGNOSIS — H35.3290 EXUDATIVE AGE-RELATED MACULAR DEGENERATION, UNSPECIFIED LATERALITY, UNSPECIFIED STAGE (HCC): ICD-10-CM

## 2018-12-05 DIAGNOSIS — R79.89 ABNORMAL TSH: ICD-10-CM

## 2018-12-05 DIAGNOSIS — E11.8 TYPE 2 DIABETES MELLITUS WITH COMPLICATION, UNSPECIFIED WHETHER LONG TERM INSULIN USE: Chronic | ICD-10-CM

## 2018-12-05 DIAGNOSIS — I48.91 ATRIAL FIBRILLATION, UNSPECIFIED TYPE (HCC): ICD-10-CM

## 2018-12-05 LAB
INTERNATIONAL NORMALIZATION RATIO, POC: 1.8
PROTHROMBIN TIME, POC: 21.6

## 2018-12-05 PROCEDURE — 81003 URINALYSIS AUTO W/O SCOPE: CPT | Performed by: FAMILY MEDICINE

## 2018-12-05 PROCEDURE — G8484 FLU IMMUNIZE NO ADMIN: HCPCS | Performed by: FAMILY MEDICINE

## 2018-12-05 PROCEDURE — 85610 PROTHROMBIN TIME: CPT | Performed by: FAMILY MEDICINE

## 2018-12-05 PROCEDURE — 99214 OFFICE O/P EST MOD 30 MIN: CPT | Performed by: FAMILY MEDICINE

## 2018-12-05 PROCEDURE — 4040F PNEUMOC VAC/ADMIN/RCVD: CPT | Performed by: FAMILY MEDICINE

## 2018-12-05 PROCEDURE — 1123F ACP DISCUSS/DSCN MKR DOCD: CPT | Performed by: FAMILY MEDICINE

## 2018-12-05 PROCEDURE — G8417 CALC BMI ABV UP PARAM F/U: HCPCS | Performed by: FAMILY MEDICINE

## 2018-12-05 PROCEDURE — 93793 ANTICOAG MGMT PT WARFARIN: CPT | Performed by: FAMILY MEDICINE

## 2018-12-05 PROCEDURE — 1036F TOBACCO NON-USER: CPT | Performed by: FAMILY MEDICINE

## 2018-12-05 PROCEDURE — G8427 DOCREV CUR MEDS BY ELIG CLIN: HCPCS | Performed by: FAMILY MEDICINE

## 2018-12-05 PROCEDURE — G8598 ASA/ANTIPLAT THER USED: HCPCS | Performed by: FAMILY MEDICINE

## 2018-12-05 PROCEDURE — 1101F PT FALLS ASSESS-DOCD LE1/YR: CPT | Performed by: FAMILY MEDICINE

## 2018-12-05 ASSESSMENT — ENCOUNTER SYMPTOMS
CHEST TIGHTNESS: 0
COLOR CHANGE: 0
CONSTIPATION: 0
WHEEZING: 0
EYE REDNESS: 0
VOMITING: 0
BLOOD IN STOOL: 0
SHORTNESS OF BREATH: 1
COUGH: 0
ABDOMINAL PAIN: 0
DIARRHEA: 0

## 2018-12-05 NOTE — PROGRESS NOTES
digoxin (LANOXIN) 125 MCG tablet Take 1 tablet by mouth daily 90 tablet 2    sacubitril-valsartan (ENTRESTO) 49-51 MG per tablet Take 1 tablet by mouth 2 times daily 180 tablet 3    carvedilol (COREG CR) 80 MG CP24 extended release capsule TAKE 1 CAPSULE DAILY 90 capsule 3    SITagliptin (JANUVIA) 100 MG tablet Take 1 tablet by mouth daily 90 tablet 3    atorvastatin (LIPITOR) 20 MG tablet Take 1 tablet by mouth daily 90 tablet 2    furosemide (LASIX) 40 MG tablet Take 1 tablet by mouth daily (Patient taking differently: Take 40 mg by mouth daily Indications: pt instructed by cardiologist to take 2 tablest on T,Sat ) 90 tablet 3    NONFORMULARY 1 tablet daily FOCUS VITAMIN      vitamin D3 (CHOLECALCIFEROL) 1000 UNITS TABS tablet Take 1 tablet by mouth daily. (Patient taking differently: Take 2,000 Units by mouth daily ) 30 tablet 0     No current facility-administered medications for this visit. No Known Allergies      REVIEW OF SYSTEMS  Review of Systems   Constitutional: Negative for chills, diaphoresis and fever. HENT: Negative. Eyes: Positive for visual disturbance. Negative for redness. Respiratory: Positive for shortness of breath. Negative for cough, chest tightness and wheezing. Cardiovascular: Negative for palpitations and leg swelling. Gastrointestinal: Negative for abdominal pain, blood in stool, constipation, diarrhea and vomiting. Musculoskeletal: Positive for arthralgias. Negative for gait problem, joint swelling and myalgias. Skin: Negative for color change and rash. Neurological: Negative for syncope and numbness. Psychiatric/Behavioral: Negative for dysphoric mood. All other systems reviewed and are negative.       PHYSICAL EXAM  BP (!) 113/56 (Site: Right Upper Arm)   Pulse 64   Temp 98 °F (36.7 °C) (Temporal)   Resp 16   Ht 5' 9\" (1.753 m)   Wt 185 lb (83.9 kg)   SpO2 95%   BMI 27.32 kg/m²   Physical Exam   Constitutional: He is oriented to person, place,

## 2018-12-06 ENCOUNTER — HOSPITAL ENCOUNTER (OUTPATIENT)
Age: 83
Discharge: HOME | End: 2018-12-06
Payer: MEDICARE

## 2018-12-06 DIAGNOSIS — D64.9 ANEMIA, UNSPECIFIED TYPE: ICD-10-CM

## 2018-12-06 DIAGNOSIS — Z79.899 HIGH RISK MEDICATION USE: ICD-10-CM

## 2018-12-06 DIAGNOSIS — E80.6 UNCONJUGATED HYPERBILIRUBINEMIA: ICD-10-CM

## 2018-12-06 LAB
ALBUMIN SERPL-MCNC: 3.9 G/DL (ref 3.5–5.2)
ALP BLD-CCNC: 71 U/L (ref 40–129)
ALT SERPL-CCNC: 12 U/L (ref 0–40)
ANION GAP SERPL CALCULATED.3IONS-SCNC: 11 MMOL/L (ref 7–16)
AST SERPL-CCNC: 10 U/L (ref 0–39)
BACTERIA: NORMAL /HPF
BASOPHILS ABSOLUTE: 0.02 E9/L (ref 0–0.2)
BASOPHILS RELATIVE PERCENT: 0.4 % (ref 0–2)
BILIRUB SERPL-MCNC: 1.9 MG/DL (ref 0–1.2)
BILIRUBIN DIRECT: 0.4 MG/DL (ref 0–0.3)
BILIRUBIN URINE: ABNORMAL
BILIRUBIN, INDIRECT: 1.5 MG/DL (ref 0–1)
BLOOD, URINE: ABNORMAL
BUN BLDV-MCNC: 29 MG/DL (ref 8–23)
CALCIUM SERPL-MCNC: 9 MG/DL (ref 8.6–10.2)
CHLORIDE BLD-SCNC: 109 MMOL/L (ref 98–107)
CLARITY: CLEAR
CO2: 25 MMOL/L (ref 22–29)
COLOR: YELLOW
CREAT SERPL-MCNC: 1.4 MG/DL (ref 0.7–1.2)
DIGOXIN LEVEL: 0.5 NG/ML (ref 0.8–2)
EOSINOPHILS ABSOLUTE: 0.14 E9/L (ref 0.05–0.5)
EOSINOPHILS RELATIVE PERCENT: 2.6 % (ref 0–6)
FERRITIN: 108 NG/ML
GFR AFRICAN AMERICAN: 59
GFR NON-AFRICAN AMERICAN: 49 ML/MIN/1.73
GLUCOSE BLD-MCNC: 125 MG/DL (ref 74–99)
GLUCOSE URINE: NEGATIVE MG/DL
HCT VFR BLD CALC: 39.3 % (ref 37–54)
HEMOGLOBIN: 12.7 G/DL (ref 12.5–16.5)
IMMATURE GRANULOCYTES #: 0.02 E9/L
IMMATURE GRANULOCYTES %: 0.4 % (ref 0–5)
IRON SATURATION: 21 % (ref 20–55)
IRON: 56 MCG/DL (ref 59–158)
KETONES, URINE: ABNORMAL MG/DL
LEUKOCYTE ESTERASE, URINE: NEGATIVE
LYMPHOCYTES ABSOLUTE: 0.98 E9/L (ref 1.5–4)
LYMPHOCYTES RELATIVE PERCENT: 17.9 % (ref 20–42)
MCH RBC QN AUTO: 30.4 PG (ref 26–35)
MCHC RBC AUTO-ENTMCNC: 32.3 % (ref 32–34.5)
MCV RBC AUTO: 94 FL (ref 80–99.9)
MONOCYTES ABSOLUTE: 0.71 E9/L (ref 0.1–0.95)
MONOCYTES RELATIVE PERCENT: 13 % (ref 2–12)
NEUTROPHILS ABSOLUTE: 3.6 E9/L (ref 1.8–7.3)
NEUTROPHILS RELATIVE PERCENT: 65.7 % (ref 43–80)
NITRITE, URINE: NEGATIVE
PDW BLD-RTO: 14.6 FL (ref 11.5–15)
PH UA: 6 (ref 5–9)
PLATELET # BLD: 131 E9/L (ref 130–450)
PMV BLD AUTO: 10.8 FL (ref 7–12)
POTASSIUM SERPL-SCNC: 4.1 MMOL/L (ref 3.5–5)
PROTEIN UA: 30 MG/DL
RBC # BLD: 4.18 E12/L (ref 3.8–5.8)
RBC UA: NORMAL /HPF (ref 0–2)
SODIUM BLD-SCNC: 145 MMOL/L (ref 132–146)
SPECIFIC GRAVITY UA: >=1.03 (ref 1–1.03)
T3 FREE: 2.6 PG/ML (ref 2–4.4)
T4 FREE: 1.26 NG/DL (ref 0.93–1.7)
TOTAL CK: 33 U/L (ref 20–200)
TOTAL IRON BINDING CAPACITY: 267 MCG/DL (ref 250–450)
TOTAL PROTEIN: 6.6 G/DL (ref 6.4–8.3)
TSH SERPL DL<=0.05 MIU/L-ACNC: 7.18 UIU/ML (ref 0.27–4.2)
UROBILINOGEN, URINE: 1 E.U./DL
WBC # BLD: 5.5 E9/L (ref 4.5–11.5)
WBC UA: NORMAL /HPF (ref 0–5)

## 2018-12-06 PROCEDURE — 82550 ASSAY OF CK (CPK): CPT

## 2018-12-06 PROCEDURE — 81001 URINALYSIS AUTO W/SCOPE: CPT

## 2018-12-06 PROCEDURE — 80076 HEPATIC FUNCTION PANEL: CPT

## 2018-12-06 PROCEDURE — 80048 BASIC METABOLIC PNL TOTAL CA: CPT

## 2018-12-06 PROCEDURE — 83550 IRON BINDING TEST: CPT

## 2018-12-06 PROCEDURE — 83540 ASSAY OF IRON: CPT

## 2018-12-06 PROCEDURE — 36415 COLL VENOUS BLD VENIPUNCTURE: CPT

## 2018-12-06 PROCEDURE — 85025 COMPLETE CBC W/AUTO DIFF WBC: CPT

## 2018-12-06 PROCEDURE — 82728 ASSAY OF FERRITIN: CPT

## 2018-12-06 PROCEDURE — 84439 ASSAY OF FREE THYROXINE: CPT

## 2018-12-06 PROCEDURE — 84443 ASSAY THYROID STIM HORMONE: CPT

## 2018-12-06 PROCEDURE — 84481 FREE ASSAY (FT-3): CPT

## 2018-12-06 PROCEDURE — 82390 ASSAY OF CERULOPLASMIN: CPT

## 2018-12-06 PROCEDURE — 80162 ASSAY OF DIGOXIN TOTAL: CPT

## 2018-12-07 LAB — PATHOLOGIST REVIEW: NORMAL

## 2018-12-10 LAB — CERULOPLASMIN: 29 MG/DL (ref 15–30)

## 2019-01-01 ENCOUNTER — TELEPHONE (OUTPATIENT)
Dept: FAMILY MEDICINE CLINIC | Age: 84
End: 2019-01-01

## 2019-01-01 ENCOUNTER — OFFICE VISIT (OUTPATIENT)
Dept: CARDIOLOGY CLINIC | Age: 84
End: 2019-01-01
Payer: MEDICARE

## 2019-01-01 ENCOUNTER — HOSPITAL ENCOUNTER (OUTPATIENT)
Dept: ULTRASOUND IMAGING | Age: 84
Discharge: HOME | End: 2019-12-29
Payer: MEDICARE

## 2019-01-01 ENCOUNTER — HOSPITAL ENCOUNTER (OUTPATIENT)
Age: 84
Discharge: HOME | End: 2019-10-10
Payer: MEDICARE

## 2019-01-01 ENCOUNTER — CARE COORDINATION (OUTPATIENT)
Dept: CASE MANAGEMENT | Age: 84
End: 2019-01-01

## 2019-01-01 ENCOUNTER — OFFICE VISIT (OUTPATIENT)
Dept: FAMILY MEDICINE CLINIC | Age: 84
End: 2019-01-01
Payer: MEDICARE

## 2019-01-01 ENCOUNTER — HOSPITAL ENCOUNTER (OUTPATIENT)
Age: 84
Discharge: HOME | End: 2019-11-09
Payer: MEDICARE

## 2019-01-01 ENCOUNTER — TELEPHONE (OUTPATIENT)
Dept: PRIMARY CARE CLINIC | Age: 84
End: 2019-01-01

## 2019-01-01 VITALS
DIASTOLIC BLOOD PRESSURE: 60 MMHG | BODY MASS INDEX: 24.79 KG/M2 | WEIGHT: 167.4 LBS | HEIGHT: 69 IN | HEART RATE: 64 BPM | RESPIRATION RATE: 16 BRPM | SYSTOLIC BLOOD PRESSURE: 122 MMHG

## 2019-01-01 VITALS
RESPIRATION RATE: 16 BRPM | SYSTOLIC BLOOD PRESSURE: 110 MMHG | BODY MASS INDEX: 25.16 KG/M2 | HEIGHT: 68 IN | HEART RATE: 53 BPM | DIASTOLIC BLOOD PRESSURE: 60 MMHG | WEIGHT: 166 LBS

## 2019-01-01 VITALS
HEIGHT: 69 IN | TEMPERATURE: 97.8 F | SYSTOLIC BLOOD PRESSURE: 107 MMHG | OXYGEN SATURATION: 91 % | WEIGHT: 167 LBS | DIASTOLIC BLOOD PRESSURE: 57 MMHG | HEART RATE: 59 BPM | BODY MASS INDEX: 24.73 KG/M2

## 2019-01-01 DIAGNOSIS — I25.10 CORONARY ARTERY DISEASE INVOLVING NATIVE CORONARY ARTERY OF NATIVE HEART WITHOUT ANGINA PECTORIS: Primary | ICD-10-CM

## 2019-01-01 DIAGNOSIS — I25.10 CORONARY ARTERY DISEASE INVOLVING NATIVE CORONARY ARTERY OF NATIVE HEART WITHOUT ANGINA PECTORIS: ICD-10-CM

## 2019-01-01 DIAGNOSIS — I25.10 CORONARY ARTERY DISEASE INVOLVING NATIVE CORONARY ARTERY OF NATIVE HEART WITHOUT ANGINA PECTORIS: Chronic | ICD-10-CM

## 2019-01-01 DIAGNOSIS — R18.8 OTHER ASCITES: ICD-10-CM

## 2019-01-01 DIAGNOSIS — K74.69 OTHER CIRRHOSIS OF LIVER (HCC): ICD-10-CM

## 2019-01-01 DIAGNOSIS — I50.42 CHRONIC COMBINED SYSTOLIC AND DIASTOLIC CONGESTIVE HEART FAILURE (HCC): ICD-10-CM

## 2019-01-01 DIAGNOSIS — K76.6 PORTAL HYPERTENSION (HCC): ICD-10-CM

## 2019-01-01 DIAGNOSIS — A04.72 CLOSTRIDIUM DIFFICILE COLITIS: Primary | ICD-10-CM

## 2019-01-01 LAB
ANION GAP SERPL CALCULATED.3IONS-SCNC: 13 MMOL/L (ref 7–16)
ANION GAP SERPL CALCULATED.3IONS-SCNC: 15 MMOL/L (ref 7–16)
BUN BLDV-MCNC: 17 MG/DL (ref 8–23)
BUN BLDV-MCNC: 26 MG/DL (ref 8–23)
CALCIUM SERPL-MCNC: 9.1 MG/DL (ref 8.6–10.2)
CALCIUM SERPL-MCNC: 9.2 MG/DL (ref 8.6–10.2)
CHLORIDE BLD-SCNC: 103 MMOL/L (ref 98–107)
CHLORIDE BLD-SCNC: 107 MMOL/L (ref 98–107)
CO2: 22 MMOL/L (ref 22–29)
CO2: 24 MMOL/L (ref 22–29)
CREAT SERPL-MCNC: 1.3 MG/DL (ref 0.7–1.2)
CREAT SERPL-MCNC: 1.4 MG/DL (ref 0.7–1.2)
GFR AFRICAN AMERICAN: 59
GFR AFRICAN AMERICAN: >60
GFR NON-AFRICAN AMERICAN: 48 ML/MIN/1.73
GFR NON-AFRICAN AMERICAN: 53 ML/MIN/1.73
GLUCOSE BLD-MCNC: 114 MG/DL (ref 74–99)
GLUCOSE BLD-MCNC: 93 MG/DL (ref 74–99)
POTASSIUM SERPL-SCNC: 4.2 MMOL/L (ref 3.5–5)
POTASSIUM SERPL-SCNC: 4.8 MMOL/L (ref 3.5–5)
PRO-BNP: 4225 PG/ML (ref 0–450)
SODIUM BLD-SCNC: 142 MMOL/L (ref 132–146)
SODIUM BLD-SCNC: 142 MMOL/L (ref 132–146)

## 2019-01-01 PROCEDURE — 1123F ACP DISCUSS/DSCN MKR DOCD: CPT | Performed by: INTERNAL MEDICINE

## 2019-01-01 PROCEDURE — 76705 ECHO EXAM OF ABDOMEN: CPT

## 2019-01-01 PROCEDURE — 99213 OFFICE O/P EST LOW 20 MIN: CPT | Performed by: INTERNAL MEDICINE

## 2019-01-01 PROCEDURE — G8484 FLU IMMUNIZE NO ADMIN: HCPCS | Performed by: INTERNAL MEDICINE

## 2019-01-01 PROCEDURE — 4040F PNEUMOC VAC/ADMIN/RCVD: CPT | Performed by: INTERNAL MEDICINE

## 2019-01-01 PROCEDURE — G8598 ASA/ANTIPLAT THER USED: HCPCS | Performed by: INTERNAL MEDICINE

## 2019-01-01 PROCEDURE — 93000 ELECTROCARDIOGRAM COMPLETE: CPT | Performed by: INTERNAL MEDICINE

## 2019-01-01 PROCEDURE — 1036F TOBACCO NON-USER: CPT | Performed by: INTERNAL MEDICINE

## 2019-01-01 PROCEDURE — 83880 ASSAY OF NATRIURETIC PEPTIDE: CPT

## 2019-01-01 PROCEDURE — 1111F DSCHRG MED/CURRENT MED MERGE: CPT | Performed by: FAMILY MEDICINE

## 2019-01-01 PROCEDURE — 99495 TRANSJ CARE MGMT MOD F2F 14D: CPT | Performed by: FAMILY MEDICINE

## 2019-01-01 PROCEDURE — 80048 BASIC METABOLIC PNL TOTAL CA: CPT

## 2019-01-01 PROCEDURE — G8420 CALC BMI NORM PARAMETERS: HCPCS | Performed by: INTERNAL MEDICINE

## 2019-01-01 PROCEDURE — G8427 DOCREV CUR MEDS BY ELIG CLIN: HCPCS | Performed by: INTERNAL MEDICINE

## 2019-01-01 PROCEDURE — G8417 CALC BMI ABV UP PARAM F/U: HCPCS | Performed by: INTERNAL MEDICINE

## 2019-01-01 RX ORDER — LISINOPRIL 5 MG/1
5 TABLET ORAL DAILY
Qty: 30 TABLET | Refills: 3 | Status: ON HOLD
Start: 2019-01-01 | End: 2020-01-01 | Stop reason: HOSPADM

## 2019-01-01 RX ORDER — LISINOPRIL 5 MG/1
5 TABLET ORAL DAILY
COMMUNITY
End: 2019-01-01 | Stop reason: SDUPTHER

## 2019-01-01 RX ORDER — BUMETANIDE 1 MG/1
2 TABLET ORAL DAILY
COMMUNITY
Start: 2019-01-01 | End: 2020-01-01 | Stop reason: DRUGHIGH

## 2019-01-01 ASSESSMENT — ENCOUNTER SYMPTOMS
DIARRHEA: 0
ABDOMINAL PAIN: 0
WHEEZING: 0
CONSTIPATION: 0
COLOR CHANGE: 0
BLOOD IN STOOL: 0
COUGH: 0
CHEST TIGHTNESS: 0
SHORTNESS OF BREATH: 0
VOMITING: 0
EYE REDNESS: 0

## 2019-01-07 ENCOUNTER — HOSPITAL ENCOUNTER (OUTPATIENT)
Dept: CARDIOLOGY | Age: 84
Discharge: HOME | End: 2019-01-07
Payer: MEDICARE

## 2019-01-07 DIAGNOSIS — I50.42 CHRONIC COMBINED SYSTOLIC AND DIASTOLIC CONGESTIVE HEART FAILURE (HCC): ICD-10-CM

## 2019-01-07 LAB
LV EF: 56 %
LVEF MODALITY: NORMAL

## 2019-01-07 PROCEDURE — 93306 TTE W/DOPPLER COMPLETE: CPT | Performed by: PSYCHIATRY & NEUROLOGY

## 2019-01-09 ENCOUNTER — NURSE ONLY (OUTPATIENT)
Dept: FAMILY MEDICINE CLINIC | Age: 84
End: 2019-01-09
Payer: MEDICARE

## 2019-01-09 ENCOUNTER — ANTI-COAG VISIT (OUTPATIENT)
Dept: FAMILY MEDICINE CLINIC | Age: 84
End: 2019-01-09
Payer: MEDICARE

## 2019-01-09 DIAGNOSIS — I48.91 ATRIAL FIBRILLATION, UNSPECIFIED TYPE (HCC): ICD-10-CM

## 2019-01-09 DIAGNOSIS — I48.91 ATRIAL FIBRILLATION, UNSPECIFIED TYPE (HCC): Primary | Chronic | ICD-10-CM

## 2019-01-09 LAB
INTERNATIONAL NORMALIZATION RATIO, POC: 4
PROTHROMBIN TIME, POC: 48.3

## 2019-01-09 PROCEDURE — 85610 PROTHROMBIN TIME: CPT | Performed by: FAMILY MEDICINE

## 2019-01-09 PROCEDURE — 93793 ANTICOAG MGMT PT WARFARIN: CPT | Performed by: FAMILY MEDICINE

## 2019-01-14 ENCOUNTER — ANTI-COAG VISIT (OUTPATIENT)
Dept: FAMILY MEDICINE CLINIC | Age: 84
End: 2019-01-14
Payer: MEDICARE

## 2019-01-14 ENCOUNTER — NURSE ONLY (OUTPATIENT)
Dept: FAMILY MEDICINE CLINIC | Age: 84
End: 2019-01-14
Payer: MEDICARE

## 2019-01-14 DIAGNOSIS — I48.91 ATRIAL FIBRILLATION, UNSPECIFIED TYPE (HCC): ICD-10-CM

## 2019-01-14 DIAGNOSIS — I48.91 ATRIAL FIBRILLATION, UNSPECIFIED TYPE (HCC): Primary | Chronic | ICD-10-CM

## 2019-01-14 LAB
INTERNATIONAL NORMALIZATION RATIO, POC: 4.6
PROTHROMBIN TIME, POC: 55.5

## 2019-01-14 PROCEDURE — 85610 PROTHROMBIN TIME: CPT | Performed by: FAMILY MEDICINE

## 2019-01-14 PROCEDURE — 93793 ANTICOAG MGMT PT WARFARIN: CPT | Performed by: FAMILY MEDICINE

## 2019-01-21 ENCOUNTER — ANTI-COAG VISIT (OUTPATIENT)
Dept: FAMILY MEDICINE CLINIC | Age: 84
End: 2019-01-21

## 2019-01-21 ENCOUNTER — NURSE ONLY (OUTPATIENT)
Dept: FAMILY MEDICINE CLINIC | Age: 84
End: 2019-01-21
Payer: MEDICARE

## 2019-01-21 DIAGNOSIS — I48.91 ATRIAL FIBRILLATION, UNSPECIFIED TYPE (HCC): ICD-10-CM

## 2019-01-21 DIAGNOSIS — I48.91 ATRIAL FIBRILLATION, UNSPECIFIED TYPE (HCC): Primary | Chronic | ICD-10-CM

## 2019-01-21 LAB
INTERNATIONAL NORMALIZATION RATIO, POC: NORMAL
PROTHROMBIN TIME, POC: 39.5

## 2019-01-21 PROCEDURE — 93793 ANTICOAG MGMT PT WARFARIN: CPT | Performed by: FAMILY MEDICINE

## 2019-01-21 PROCEDURE — 85610 PROTHROMBIN TIME: CPT | Performed by: FAMILY MEDICINE

## 2019-01-29 ENCOUNTER — NURSE ONLY (OUTPATIENT)
Dept: FAMILY MEDICINE CLINIC | Age: 84
End: 2019-01-29
Payer: MEDICARE

## 2019-01-29 ENCOUNTER — ANTI-COAG VISIT (OUTPATIENT)
Dept: FAMILY MEDICINE CLINIC | Age: 84
End: 2019-01-29
Payer: MEDICARE

## 2019-01-29 DIAGNOSIS — I48.91 ATRIAL FIBRILLATION, UNSPECIFIED TYPE (HCC): ICD-10-CM

## 2019-01-29 DIAGNOSIS — I48.91 ATRIAL FIBRILLATION, UNSPECIFIED TYPE (HCC): Primary | ICD-10-CM

## 2019-01-29 LAB
INTERNATIONAL NORMALIZATION RATIO, POC: 2.4
PROTHROMBIN TIME, POC: 28.5

## 2019-01-29 PROCEDURE — 93793 ANTICOAG MGMT PT WARFARIN: CPT | Performed by: FAMILY MEDICINE

## 2019-01-29 PROCEDURE — 85610 PROTHROMBIN TIME: CPT | Performed by: FAMILY MEDICINE

## 2019-02-13 ENCOUNTER — ANTI-COAG VISIT (OUTPATIENT)
Dept: FAMILY MEDICINE CLINIC | Age: 84
End: 2019-02-13
Payer: MEDICARE

## 2019-02-13 ENCOUNTER — NURSE ONLY (OUTPATIENT)
Dept: FAMILY MEDICINE CLINIC | Age: 84
End: 2019-02-13
Payer: MEDICARE

## 2019-02-13 DIAGNOSIS — I48.91 ATRIAL FIBRILLATION, UNSPECIFIED TYPE (HCC): ICD-10-CM

## 2019-02-13 DIAGNOSIS — I48.91 ATRIAL FIBRILLATION, UNSPECIFIED TYPE (HCC): Primary | Chronic | ICD-10-CM

## 2019-02-13 LAB
INTERNATIONAL NORMALIZATION RATIO, POC: 2.5
PROTHROMBIN TIME, POC: 30.3

## 2019-02-13 PROCEDURE — 85610 PROTHROMBIN TIME: CPT | Performed by: FAMILY MEDICINE

## 2019-02-13 PROCEDURE — 93793 ANTICOAG MGMT PT WARFARIN: CPT | Performed by: FAMILY MEDICINE

## 2019-02-26 ENCOUNTER — ANTI-COAG VISIT (OUTPATIENT)
Dept: FAMILY MEDICINE CLINIC | Age: 84
End: 2019-02-26

## 2019-02-26 ENCOUNTER — OFFICE VISIT (OUTPATIENT)
Dept: FAMILY MEDICINE CLINIC | Age: 84
End: 2019-02-26
Payer: MEDICARE

## 2019-02-26 VITALS
TEMPERATURE: 96.4 F | HEIGHT: 69 IN | SYSTOLIC BLOOD PRESSURE: 115 MMHG | RESPIRATION RATE: 16 BRPM | BODY MASS INDEX: 27.11 KG/M2 | OXYGEN SATURATION: 95 % | HEART RATE: 60 BPM | DIASTOLIC BLOOD PRESSURE: 56 MMHG | WEIGHT: 183 LBS

## 2019-02-26 DIAGNOSIS — I50.42 CHRONIC COMBINED SYSTOLIC AND DIASTOLIC CONGESTIVE HEART FAILURE (HCC): ICD-10-CM

## 2019-02-26 DIAGNOSIS — I25.10 CORONARY ARTERY DISEASE INVOLVING NATIVE CORONARY ARTERY OF NATIVE HEART WITHOUT ANGINA PECTORIS: Chronic | ICD-10-CM

## 2019-02-26 DIAGNOSIS — H35.3290 EXUDATIVE AGE-RELATED MACULAR DEGENERATION, UNSPECIFIED LATERALITY, UNSPECIFIED STAGE (HCC): ICD-10-CM

## 2019-02-26 DIAGNOSIS — I48.91 ATRIAL FIBRILLATION, UNSPECIFIED TYPE (HCC): Primary | Chronic | ICD-10-CM

## 2019-02-26 DIAGNOSIS — Z79.899 HIGH RISK MEDICATION USE: ICD-10-CM

## 2019-02-26 DIAGNOSIS — I10 ESSENTIAL HYPERTENSION: Chronic | ICD-10-CM

## 2019-02-26 DIAGNOSIS — E03.9 ACQUIRED HYPOTHYROIDISM: ICD-10-CM

## 2019-02-26 DIAGNOSIS — I48.91 ATRIAL FIBRILLATION, UNSPECIFIED TYPE (HCC): ICD-10-CM

## 2019-02-26 DIAGNOSIS — M35.3 POLYMYALGIA RHEUMATICA SYNDROME (HCC): ICD-10-CM

## 2019-02-26 DIAGNOSIS — E11.8 TYPE 2 DIABETES MELLITUS WITH COMPLICATION, UNSPECIFIED WHETHER LONG TERM INSULIN USE: Chronic | ICD-10-CM

## 2019-02-26 DIAGNOSIS — E55.9 VITAMIN D DEFICIENCY: ICD-10-CM

## 2019-02-26 LAB
INTERNATIONAL NORMALIZATION RATIO, POC: 2.5
PROTHROMBIN TIME, POC: 30.5

## 2019-02-26 PROCEDURE — G8598 ASA/ANTIPLAT THER USED: HCPCS | Performed by: FAMILY MEDICINE

## 2019-02-26 PROCEDURE — 93793 ANTICOAG MGMT PT WARFARIN: CPT | Performed by: FAMILY MEDICINE

## 2019-02-26 PROCEDURE — 81003 URINALYSIS AUTO W/O SCOPE: CPT | Performed by: FAMILY MEDICINE

## 2019-02-26 PROCEDURE — G8484 FLU IMMUNIZE NO ADMIN: HCPCS | Performed by: FAMILY MEDICINE

## 2019-02-26 PROCEDURE — 1101F PT FALLS ASSESS-DOCD LE1/YR: CPT | Performed by: FAMILY MEDICINE

## 2019-02-26 PROCEDURE — G8427 DOCREV CUR MEDS BY ELIG CLIN: HCPCS | Performed by: FAMILY MEDICINE

## 2019-02-26 PROCEDURE — G8417 CALC BMI ABV UP PARAM F/U: HCPCS | Performed by: FAMILY MEDICINE

## 2019-02-26 PROCEDURE — 99215 OFFICE O/P EST HI 40 MIN: CPT | Performed by: FAMILY MEDICINE

## 2019-02-26 PROCEDURE — 85610 PROTHROMBIN TIME: CPT | Performed by: FAMILY MEDICINE

## 2019-02-26 PROCEDURE — 1123F ACP DISCUSS/DSCN MKR DOCD: CPT | Performed by: FAMILY MEDICINE

## 2019-02-26 PROCEDURE — 4040F PNEUMOC VAC/ADMIN/RCVD: CPT | Performed by: FAMILY MEDICINE

## 2019-02-26 PROCEDURE — 1036F TOBACCO NON-USER: CPT | Performed by: FAMILY MEDICINE

## 2019-02-26 RX ORDER — DIGOXIN 125 MCG
125 TABLET ORAL DAILY
Qty: 90 TABLET | Refills: 3 | Status: SHIPPED | OUTPATIENT
Start: 2019-02-26 | End: 2019-08-13 | Stop reason: SDUPTHER

## 2019-02-26 ASSESSMENT — PATIENT HEALTH QUESTIONNAIRE - PHQ9
SUM OF ALL RESPONSES TO PHQ9 QUESTIONS 1 & 2: 0
SUM OF ALL RESPONSES TO PHQ QUESTIONS 1-9: 0
1. LITTLE INTEREST OR PLEASURE IN DOING THINGS: 0
2. FEELING DOWN, DEPRESSED OR HOPELESS: 0
SUM OF ALL RESPONSES TO PHQ QUESTIONS 1-9: 0

## 2019-02-26 ASSESSMENT — ENCOUNTER SYMPTOMS
WHEEZING: 0
DIARRHEA: 0
SHORTNESS OF BREATH: 1
COLOR CHANGE: 0
ABDOMINAL PAIN: 0
EYE REDNESS: 0
VOMITING: 0
COUGH: 0
CONSTIPATION: 0
CHEST TIGHTNESS: 0
BLOOD IN STOOL: 0

## 2019-03-21 ENCOUNTER — OFFICE VISIT (OUTPATIENT)
Dept: CARDIOLOGY CLINIC | Age: 84
End: 2019-03-21
Payer: MEDICARE

## 2019-03-21 VITALS
HEIGHT: 66 IN | SYSTOLIC BLOOD PRESSURE: 98 MMHG | DIASTOLIC BLOOD PRESSURE: 60 MMHG | RESPIRATION RATE: 16 BRPM | HEART RATE: 64 BPM | BODY MASS INDEX: 29.18 KG/M2 | WEIGHT: 181.6 LBS

## 2019-03-21 DIAGNOSIS — I25.10 CORONARY ARTERY DISEASE INVOLVING NATIVE CORONARY ARTERY OF NATIVE HEART WITHOUT ANGINA PECTORIS: Primary | Chronic | ICD-10-CM

## 2019-03-21 PROCEDURE — G8417 CALC BMI ABV UP PARAM F/U: HCPCS | Performed by: INTERNAL MEDICINE

## 2019-03-21 PROCEDURE — 99213 OFFICE O/P EST LOW 20 MIN: CPT | Performed by: INTERNAL MEDICINE

## 2019-03-21 PROCEDURE — 1123F ACP DISCUSS/DSCN MKR DOCD: CPT | Performed by: INTERNAL MEDICINE

## 2019-03-21 PROCEDURE — G8427 DOCREV CUR MEDS BY ELIG CLIN: HCPCS | Performed by: INTERNAL MEDICINE

## 2019-03-21 PROCEDURE — 1101F PT FALLS ASSESS-DOCD LE1/YR: CPT | Performed by: INTERNAL MEDICINE

## 2019-03-21 PROCEDURE — 1036F TOBACCO NON-USER: CPT | Performed by: INTERNAL MEDICINE

## 2019-03-21 PROCEDURE — G8484 FLU IMMUNIZE NO ADMIN: HCPCS | Performed by: INTERNAL MEDICINE

## 2019-03-21 PROCEDURE — 93000 ELECTROCARDIOGRAM COMPLETE: CPT | Performed by: INTERNAL MEDICINE

## 2019-03-21 PROCEDURE — G8598 ASA/ANTIPLAT THER USED: HCPCS | Performed by: INTERNAL MEDICINE

## 2019-03-21 PROCEDURE — 4040F PNEUMOC VAC/ADMIN/RCVD: CPT | Performed by: INTERNAL MEDICINE

## 2019-03-26 ENCOUNTER — NURSE ONLY (OUTPATIENT)
Dept: FAMILY MEDICINE CLINIC | Age: 84
End: 2019-03-26
Payer: MEDICARE

## 2019-03-26 ENCOUNTER — ANTI-COAG VISIT (OUTPATIENT)
Dept: FAMILY MEDICINE CLINIC | Age: 84
End: 2019-03-26

## 2019-03-26 DIAGNOSIS — I48.91 ATRIAL FIBRILLATION, UNSPECIFIED TYPE (HCC): ICD-10-CM

## 2019-03-26 DIAGNOSIS — I48.91 ATRIAL FIBRILLATION, UNSPECIFIED TYPE (HCC): Primary | ICD-10-CM

## 2019-03-26 LAB
INTERNATIONAL NORMALIZATION RATIO, POC: 2.4
PROTHROMBIN TIME, POC: 29

## 2019-03-26 PROCEDURE — 85610 PROTHROMBIN TIME: CPT | Performed by: FAMILY MEDICINE

## 2019-04-15 DIAGNOSIS — R06.02 SOB (SHORTNESS OF BREATH): ICD-10-CM

## 2019-04-15 DIAGNOSIS — R73.9 HYPERGLYCEMIA: ICD-10-CM

## 2019-04-15 DIAGNOSIS — I48.91 ATRIAL FIBRILLATION, UNSPECIFIED TYPE (HCC): Chronic | ICD-10-CM

## 2019-04-16 DIAGNOSIS — I48.91 ATRIAL FIBRILLATION, UNSPECIFIED TYPE (HCC): Chronic | ICD-10-CM

## 2019-04-16 RX ORDER — CARVEDILOL PHOSPHATE 80 MG/1
CAPSULE, EXTENDED RELEASE ORAL
Qty: 90 CAPSULE | Refills: 3 | Status: ON HOLD | OUTPATIENT
Start: 2019-04-16 | End: 2019-09-26 | Stop reason: HOSPADM

## 2019-04-16 RX ORDER — FUROSEMIDE 40 MG/1
40 TABLET ORAL DAILY
Qty: 90 TABLET | Refills: 3 | Status: ON HOLD | OUTPATIENT
Start: 2019-04-16 | End: 2019-09-04 | Stop reason: HOSPADM

## 2019-04-16 RX ORDER — SACUBITRIL AND VALSARTAN 49; 51 MG/1; MG/1
TABLET, FILM COATED ORAL
Qty: 180 TABLET | Refills: 3 | Status: ON HOLD | OUTPATIENT
Start: 2019-04-16 | End: 2019-09-26 | Stop reason: HOSPADM

## 2019-04-16 RX ORDER — SITAGLIPTIN 100 MG/1
TABLET, FILM COATED ORAL
Qty: 90 TABLET | Refills: 3 | Status: ON HOLD | OUTPATIENT
Start: 2019-04-16 | End: 2020-01-01 | Stop reason: HOSPADM

## 2019-04-16 RX ORDER — WARFARIN SODIUM 5 MG/1
TABLET ORAL
Qty: 150 TABLET | Refills: 2 | Status: SHIPPED | OUTPATIENT
Start: 2019-04-16 | End: 2019-08-27 | Stop reason: DRUGHIGH

## 2019-04-28 DIAGNOSIS — I48.91 ATRIAL FIBRILLATION, UNSPECIFIED TYPE (HCC): Chronic | ICD-10-CM

## 2019-04-29 ENCOUNTER — ANTI-COAG VISIT (OUTPATIENT)
Dept: FAMILY MEDICINE CLINIC | Age: 84
End: 2019-04-29

## 2019-04-29 ENCOUNTER — NURSE ONLY (OUTPATIENT)
Dept: FAMILY MEDICINE CLINIC | Age: 84
End: 2019-04-29
Payer: MEDICARE

## 2019-04-29 DIAGNOSIS — I48.91 ATRIAL FIBRILLATION, UNSPECIFIED TYPE (HCC): ICD-10-CM

## 2019-04-29 DIAGNOSIS — I48.0 PAROXYSMAL ATRIAL FIBRILLATION (HCC): Primary | ICD-10-CM

## 2019-04-29 LAB
INTERNATIONAL NORMALIZATION RATIO, POC: 2.5
PROTHROMBIN TIME, POC: 30.6

## 2019-04-29 PROCEDURE — 85610 PROTHROMBIN TIME: CPT | Performed by: FAMILY MEDICINE

## 2019-05-03 RX ORDER — POTASSIUM CHLORIDE 1500 MG/1
TABLET, EXTENDED RELEASE ORAL
Qty: 90 TABLET | Refills: 2 | Status: ON HOLD | OUTPATIENT
Start: 2019-05-03 | End: 2019-09-04 | Stop reason: HOSPADM

## 2019-05-29 ENCOUNTER — NURSE ONLY (OUTPATIENT)
Dept: FAMILY MEDICINE CLINIC | Age: 84
End: 2019-05-29
Payer: MEDICARE

## 2019-05-29 ENCOUNTER — ANTI-COAG VISIT (OUTPATIENT)
Dept: FAMILY MEDICINE CLINIC | Age: 84
End: 2019-05-29

## 2019-05-29 DIAGNOSIS — I48.91 ATRIAL FIBRILLATION, UNSPECIFIED TYPE (HCC): ICD-10-CM

## 2019-05-29 DIAGNOSIS — I48.0 PAROXYSMAL ATRIAL FIBRILLATION (HCC): Primary | ICD-10-CM

## 2019-05-29 LAB
INTERNATIONAL NORMALIZATION RATIO, POC: 2.3
PROTHROMBIN TIME, POC: 27.6

## 2019-05-29 PROCEDURE — 85610 PROTHROMBIN TIME: CPT | Performed by: FAMILY MEDICINE

## 2019-05-30 NOTE — PROGRESS NOTES
Called and spoke to patients wife, advised to continue same coumadin dose and recheck INR in one month

## 2019-06-06 ENCOUNTER — HOSPITAL ENCOUNTER (OUTPATIENT)
Age: 84
Discharge: HOME | End: 2019-06-06
Payer: MEDICARE

## 2019-06-06 DIAGNOSIS — Z79.899 HIGH RISK MEDICATION USE: ICD-10-CM

## 2019-06-06 DIAGNOSIS — E55.9 VITAMIN D DEFICIENCY: ICD-10-CM

## 2019-06-06 DIAGNOSIS — I48.91 ATRIAL FIBRILLATION, UNSPECIFIED TYPE (HCC): Chronic | ICD-10-CM

## 2019-06-06 DIAGNOSIS — E11.8 TYPE 2 DIABETES MELLITUS WITH COMPLICATION, UNSPECIFIED WHETHER LONG TERM INSULIN USE: Chronic | ICD-10-CM

## 2019-06-06 DIAGNOSIS — E03.9 ACQUIRED HYPOTHYROIDISM: ICD-10-CM

## 2019-06-06 DIAGNOSIS — I50.42 CHRONIC COMBINED SYSTOLIC AND DIASTOLIC CONGESTIVE HEART FAILURE (HCC): ICD-10-CM

## 2019-06-06 LAB
ALBUMIN SERPL-MCNC: 4 G/DL (ref 3.5–5.2)
ALP BLD-CCNC: 75 U/L (ref 40–129)
ALT SERPL-CCNC: 13 U/L (ref 0–40)
ANION GAP SERPL CALCULATED.3IONS-SCNC: 11 MMOL/L (ref 7–16)
AST SERPL-CCNC: 16 U/L (ref 0–39)
BACTERIA: NORMAL /HPF
BASOPHILS ABSOLUTE: 0.02 E9/L (ref 0–0.2)
BASOPHILS RELATIVE PERCENT: 0.4 % (ref 0–2)
BILIRUB SERPL-MCNC: 1.9 MG/DL (ref 0–1.2)
BILIRUBIN URINE: NEGATIVE
BLOOD, URINE: NEGATIVE
BUN BLDV-MCNC: 26 MG/DL (ref 8–23)
CALCIUM SERPL-MCNC: 9.2 MG/DL (ref 8.6–10.2)
CHLORIDE BLD-SCNC: 105 MMOL/L (ref 98–107)
CHOLESTEROL, TOTAL: 98 MG/DL (ref 0–199)
CLARITY: CLEAR
CO2: 27 MMOL/L (ref 22–29)
COLOR: YELLOW
CREAT SERPL-MCNC: 1.4 MG/DL (ref 0.7–1.2)
CREATININE URINE: 215 MG/DL (ref 40–278)
DIGOXIN LEVEL: 0.9 NG/ML (ref 0.8–2)
EOSINOPHILS ABSOLUTE: 0.13 E9/L (ref 0.05–0.5)
EOSINOPHILS RELATIVE PERCENT: 2.3 % (ref 0–6)
GFR AFRICAN AMERICAN: 59
GFR NON-AFRICAN AMERICAN: 49 ML/MIN/1.73
GLUCOSE BLD-MCNC: 115 MG/DL (ref 74–99)
GLUCOSE URINE: NEGATIVE MG/DL
HBA1C MFR BLD: 6 % (ref 4–5.6)
HCT VFR BLD CALC: 37 % (ref 37–54)
HDLC SERPL-MCNC: 32 MG/DL
HEMOGLOBIN: 12.3 G/DL (ref 12.5–16.5)
IMMATURE GRANULOCYTES #: 0.02 E9/L
IMMATURE GRANULOCYTES %: 0.4 % (ref 0–5)
KETONES, URINE: NEGATIVE MG/DL
LDL CHOLESTEROL CALCULATED: 52 MG/DL (ref 0–99)
LEUKOCYTE ESTERASE, URINE: NEGATIVE
LYMPHOCYTES ABSOLUTE: 0.92 E9/L (ref 1.5–4)
LYMPHOCYTES RELATIVE PERCENT: 16.1 % (ref 20–42)
MAGNESIUM: 2.3 MG/DL (ref 1.6–2.6)
MCH RBC QN AUTO: 30.9 PG (ref 26–35)
MCHC RBC AUTO-ENTMCNC: 33.2 % (ref 32–34.5)
MCV RBC AUTO: 93 FL (ref 80–99.9)
MICROALBUMIN UR-MCNC: 67.9 MG/L
MICROALBUMIN/CREAT UR-RTO: 31.6 (ref 0–30)
MONOCYTES ABSOLUTE: 0.74 E9/L (ref 0.1–0.95)
MONOCYTES RELATIVE PERCENT: 13 % (ref 2–12)
NEUTROPHILS ABSOLUTE: 3.87 E9/L (ref 1.8–7.3)
NEUTROPHILS RELATIVE PERCENT: 67.8 % (ref 43–80)
NITRITE, URINE: NEGATIVE
PDW BLD-RTO: 14.5 FL (ref 11.5–15)
PH UA: 5.5 (ref 5–9)
PLATELET # BLD: 136 E9/L (ref 130–450)
PMV BLD AUTO: 11.4 FL (ref 7–12)
POTASSIUM SERPL-SCNC: 4.3 MMOL/L (ref 3.5–5)
PROTEIN UA: NORMAL MG/DL
RBC # BLD: 3.98 E12/L (ref 3.8–5.8)
RBC UA: NORMAL /HPF (ref 0–2)
SODIUM BLD-SCNC: 143 MMOL/L (ref 132–146)
SPECIFIC GRAVITY UA: 1.02 (ref 1–1.03)
TOTAL PROTEIN: 6.5 G/DL (ref 6.4–8.3)
TRIGL SERPL-MCNC: 71 MG/DL (ref 0–149)
TSH SERPL DL<=0.05 MIU/L-ACNC: 5.83 UIU/ML (ref 0.27–4.2)
UROBILINOGEN, URINE: 1 E.U./DL
VITAMIN D 25-HYDROXY: 40 NG/ML (ref 30–100)
VLDLC SERPL CALC-MCNC: 14 MG/DL
WBC # BLD: 5.7 E9/L (ref 4.5–11.5)
WBC UA: NORMAL /HPF (ref 0–5)

## 2019-06-06 PROCEDURE — 84443 ASSAY THYROID STIM HORMONE: CPT

## 2019-06-06 PROCEDURE — 82044 UR ALBUMIN SEMIQUANTITATIVE: CPT

## 2019-06-06 PROCEDURE — 80061 LIPID PANEL: CPT

## 2019-06-06 PROCEDURE — 81001 URINALYSIS AUTO W/SCOPE: CPT

## 2019-06-06 PROCEDURE — 83036 HEMOGLOBIN GLYCOSYLATED A1C: CPT

## 2019-06-06 PROCEDURE — 85025 COMPLETE CBC W/AUTO DIFF WBC: CPT

## 2019-06-06 PROCEDURE — 80053 COMPREHEN METABOLIC PANEL: CPT

## 2019-06-06 PROCEDURE — 36415 COLL VENOUS BLD VENIPUNCTURE: CPT

## 2019-06-06 PROCEDURE — 82570 ASSAY OF URINE CREATININE: CPT

## 2019-06-06 PROCEDURE — 82306 VITAMIN D 25 HYDROXY: CPT

## 2019-06-06 PROCEDURE — 80162 ASSAY OF DIGOXIN TOTAL: CPT

## 2019-06-06 PROCEDURE — 83735 ASSAY OF MAGNESIUM: CPT

## 2019-06-26 ENCOUNTER — NURSE ONLY (OUTPATIENT)
Dept: FAMILY MEDICINE CLINIC | Age: 84
End: 2019-06-26
Payer: MEDICARE

## 2019-06-26 ENCOUNTER — ANTI-COAG VISIT (OUTPATIENT)
Dept: FAMILY MEDICINE CLINIC | Age: 84
End: 2019-06-26

## 2019-06-26 DIAGNOSIS — I48.91 ATRIAL FIBRILLATION, UNSPECIFIED TYPE (HCC): ICD-10-CM

## 2019-06-26 DIAGNOSIS — I48.91 ATRIAL FIBRILLATION, UNSPECIFIED TYPE (HCC): Primary | Chronic | ICD-10-CM

## 2019-06-26 LAB
INTERNATIONAL NORMALIZATION RATIO, POC: 1.9
PROTHROMBIN TIME, POC: 22.5

## 2019-06-26 PROCEDURE — 85610 PROTHROMBIN TIME: CPT | Performed by: FAMILY MEDICINE

## 2019-07-24 ENCOUNTER — NURSE ONLY (OUTPATIENT)
Dept: FAMILY MEDICINE CLINIC | Age: 84
End: 2019-07-24
Payer: MEDICARE

## 2019-07-24 ENCOUNTER — ANTI-COAG VISIT (OUTPATIENT)
Dept: FAMILY MEDICINE CLINIC | Age: 84
End: 2019-07-24

## 2019-07-24 DIAGNOSIS — I48.91 ATRIAL FIBRILLATION, UNSPECIFIED TYPE (HCC): ICD-10-CM

## 2019-07-24 DIAGNOSIS — I48.91 ATRIAL FIBRILLATION, UNSPECIFIED TYPE (HCC): Primary | ICD-10-CM

## 2019-07-24 LAB
INTERNATIONAL NORMALIZATION RATIO, POC: 2.1
PROTHROMBIN TIME, POC: 24.7

## 2019-07-24 PROCEDURE — 85610 PROTHROMBIN TIME: CPT | Performed by: FAMILY MEDICINE

## 2019-08-10 DIAGNOSIS — I48.91 ATRIAL FIBRILLATION, UNSPECIFIED TYPE (HCC): Chronic | ICD-10-CM

## 2019-08-13 RX ORDER — DIGOXIN 125 MCG
TABLET ORAL
Qty: 90 TABLET | Refills: 2 | Status: SHIPPED | OUTPATIENT
Start: 2019-08-13

## 2019-08-22 ENCOUNTER — OFFICE VISIT (OUTPATIENT)
Dept: FAMILY MEDICINE CLINIC | Age: 84
End: 2019-08-22
Payer: MEDICARE

## 2019-08-22 VITALS
HEART RATE: 53 BPM | RESPIRATION RATE: 18 BRPM | SYSTOLIC BLOOD PRESSURE: 98 MMHG | HEIGHT: 66 IN | OXYGEN SATURATION: 97 % | WEIGHT: 186.7 LBS | DIASTOLIC BLOOD PRESSURE: 68 MMHG | BODY MASS INDEX: 30.01 KG/M2

## 2019-08-22 DIAGNOSIS — Z00.00 ROUTINE GENERAL MEDICAL EXAMINATION AT A HEALTH CARE FACILITY: Primary | ICD-10-CM

## 2019-08-22 DIAGNOSIS — Z71.89 COUNSELING REGARDING ADVANCED DIRECTIVES AND GOALS OF CARE: ICD-10-CM

## 2019-08-22 DIAGNOSIS — N18.30 CHRONIC KIDNEY DISEASE, STAGE III (MODERATE) (HCC): ICD-10-CM

## 2019-08-22 PROCEDURE — 99497 ADVNCD CARE PLAN 30 MIN: CPT | Performed by: FAMILY MEDICINE

## 2019-08-22 PROCEDURE — G8598 ASA/ANTIPLAT THER USED: HCPCS | Performed by: FAMILY MEDICINE

## 2019-08-22 PROCEDURE — 4040F PNEUMOC VAC/ADMIN/RCVD: CPT | Performed by: FAMILY MEDICINE

## 2019-08-22 PROCEDURE — 1123F ACP DISCUSS/DSCN MKR DOCD: CPT | Performed by: FAMILY MEDICINE

## 2019-08-22 PROCEDURE — G0439 PPPS, SUBSEQ VISIT: HCPCS | Performed by: FAMILY MEDICINE

## 2019-08-22 ASSESSMENT — LIFESTYLE VARIABLES
HAVE YOU OR SOMEONE ELSE BEEN INJURED AS A RESULT OF YOUR DRINKING: 0
HOW OFTEN DURING THE LAST YEAR HAVE YOU NEEDED AN ALCOHOLIC DRINK FIRST THING IN THE MORNING TO GET YOURSELF GOING AFTER A NIGHT OF HEAVY DRINKING: 0
AUDIT TOTAL SCORE: 1
HOW OFTEN DURING THE LAST YEAR HAVE YOU HAD A FEELING OF GUILT OR REMORSE AFTER DRINKING: 0
HOW OFTEN DO YOU HAVE SIX OR MORE DRINKS ON ONE OCCASION: 0
HAS A RELATIVE, FRIEND, DOCTOR, OR ANOTHER HEALTH PROFESSIONAL EXPRESSED CONCERN ABOUT YOUR DRINKING OR SUGGESTED YOU CUT DOWN: 0
AUDIT-C TOTAL SCORE: 1
HOW OFTEN DURING THE LAST YEAR HAVE YOU BEEN UNABLE TO REMEMBER WHAT HAPPENED THE NIGHT BEFORE BECAUSE YOU HAD BEEN DRINKING: 0
HOW OFTEN DURING THE LAST YEAR HAVE YOU FAILED TO DO WHAT WAS NORMALLY EXPECTED FROM YOU BECAUSE OF DRINKING: 0
HOW OFTEN DO YOU HAVE A DRINK CONTAINING ALCOHOL: 1
HOW MANY STANDARD DRINKS CONTAINING ALCOHOL DO YOU HAVE ON A TYPICAL DAY: 0
HOW OFTEN DURING THE LAST YEAR HAVE YOU FOUND THAT YOU WERE NOT ABLE TO STOP DRINKING ONCE YOU HAD STARTED: 0

## 2019-08-22 ASSESSMENT — PATIENT HEALTH QUESTIONNAIRE - PHQ9
SUM OF ALL RESPONSES TO PHQ QUESTIONS 1-9: 0
SUM OF ALL RESPONSES TO PHQ QUESTIONS 1-9: 0

## 2019-08-22 NOTE — PROGRESS NOTES
Medicare Annual Wellness Visit  [facility]   MRN: [id number] Sex: Male   Age: 82 y.o. Ethnicity: Non-/Non    : 1935 Race: Irene Nieto. is here for Medicare AWV    Screenings for behavioral, psychosocial and functional/safety risks, and cognitive dysfunction are all negative except as indicated below. These results, as well as other patient data from the [address] form, are documented in Flowsheets linked to this Encounter. No Known Allergies    Prior to Visit Medications    Medication Sig Taking? Authorizing Provider   digoxin (LANOXIN) 125 MCG tablet TAKE 1 TABLET DAILY Yes Eddy Mccall MD   carvedilol (COREG CR) 80 MG CP24 extended release capsule TAKE 1 CAPSULE DAILY Yes Eddy Mccall MD   ENTRESTO 49-51 MG per tablet TAKE 1 TABLET TWICE A DAY Yes Eddy Mccall MD   JANUVIA 100 MG tablet TAKE 1 TABLET DAILY Yes Eddy Mccall MD   ranolazine (RANEXA) 500 MG extended release tablet Take 1 tablet by mouth 2 times daily Yes Eddy Mccall MD   atorvastatin (LIPITOR) 20 MG tablet Take 1 tablet by mouth daily Yes Eddy Mccall MD   vitamin D3 (CHOLECALCIFEROL) 1000 UNITS TABS tablet Take 1 tablet by mouth daily.  Yes Kevin Rockwell DO   potassium chloride (KLOR-CON M) 20 MEQ extended release tablet Take 2 tablets by mouth daily  Brandt Linder,    bumetanide (BUMEX) 2 MG tablet Take 1 tablet by mouth daily  Vika Martin MD   warfarin (COUMADIN) 5 MG tablet Take 5 mg by mouth See Admin Instructions On Monday, Wednesday, and Friday  Historical MD Stevie   warfarin (COUMADIN) 7.5 MG tablet Take 7.5 mg by mouth See Admin Instructions On , Tuesday, Thursday, and Saturday  Historical Provider, MD       Past Medical History:   Diagnosis Date    Arrhythmia     Atrial fibrillation ([facility] [address]     CAD (coronary artery disease)     Chronic kidney disease     Congestive heart disease ([facility] [address]     Diabetes mellitus complete Health Risk Assessment and screening values have been reviewed and are found in Flowsheets. The following problems were reviewed today and where indicated follow up appointments were made and/or referrals ordered. Positive Risk Factor Screenings with Interventions:     General Health:  General  In general, how would you say your health is?: Good  In the past 7 days, have you experienced any of the following? New or Increased Pain, New or Increased Fatigue, Loneliness, Social Isolation, Stress or Anger?: None of These  Do you get the social and emotional support that you need?: Yes  Do you have a Living Will?: (!) No  General Health Risk Interventions:  · No Living Will: provided the state-specific advance directive document to the patient    Health Habits/Nutrition:  Health Habits/Nutrition  Do you exercise for at least 20 minutes 2-3 times per week?: (!) No  Have you lost any weight without trying in the past 3 months?: No  Do you eat fewer than 2 meals per day?: No  Have you seen a dentist within the past year?: Yes  Body mass index is 30.13 kg/m².   Health Habits/Nutrition Interventions:  · Inadequate physical activity:  educational materials provided to promote increased physical activity    Hearing/Vision:  No exam data present  Hearing/Vision  Do you or your family notice any trouble with your hearing?: (!) Yes  Do you have difficulty driving, watching TV, or doing any of your daily activities because of your eyesight?: (!) Yes  Have you had an eye exam within the past year?: Yes  Hearing/Vision Interventions:  · Hearing concerns:  wears aids  · Vision concerns:  patient encouraged to make appointment with his/her eye specialist    Personalized Preventive Plan   Current Health Maintenance Status  Immunization History   Administered Date(s) Administered    Pneumococcal Conjugate 13-valent ([id number]) 04/20/2015    Pneumococcal Polysaccharide ([id number]) 02/16/2017        Health Maintenance Topic Date Due    Hepatitis A vaccine (1 of 2 - Risk 2-dose series) 07/19/1936    Hepatitis B Vaccine (1 of 3 - Risk 3-dose series) 07/19/1956    DTaP/Tdap/Td vaccine (1 - Tdap) 07/19/1956    Shingles Vaccine (1 of 2) 07/19/1987    Annual Wellness Visit (AWV)  05/29/2019    Flu vaccine (1) 09/01/2019    TSH testing  06/06/2020    Potassium monitoring  09/09/2020    Creatinine monitoring  09/09/2020    Pneumococcal 65+ years Vaccine  Completed     Recommendations for Preventive Services Due: see orders and patient instructions/AVS.  . Recommended screening schedule for the next 5-10 years is provided to the patient in written form: see Patient Stalin Rodríguez was seen today for medicare awv. Diagnoses and all orders for this visit:    Routine general medical examination at a health care facility    Chronic kidney disease, stage III (moderate) ([address]  Quality & Risk Score Accuracy    Visit Dx:  N18.3 - Chronic kidney disease, stage III (moderate) (HCC)  Assessment and plan:  Stable based upon symptoms and exam. Continue current treatment plan and follow up at least yearly. Last edited 09/11/19 19:31 EDT by Ronaldo Montesinos MD          Counseling regarding advanced directives and goals of care        Advanced Care Planning: Discussed the patients choices for care and treatment in case of a health event that adversely affects decision-making abilities. Also discussed the patients long-term treatment options. Reviewed with the patient the [address] [place] [address] Declaration forms  Reviewed the process of designating a competent adult as an Agent (or -in-fact) that could take make health care decisions for the patient if incompetent. Patient was asked to complete the declaration forms, either acknowledge the forms by a public notary or an eligible witness and provide a signed copy to the practice office.   Time spent (minutes): 7 minutes       Jermaine Reddy

## 2019-08-26 ENCOUNTER — TELEPHONE (OUTPATIENT)
Dept: FAMILY MEDICINE CLINIC | Age: 84
End: 2019-08-26

## 2019-08-26 ENCOUNTER — HOSPITAL ENCOUNTER (EMERGENCY)
Age: 84
Discharge: LEFT BEFORE DISCHARGE | DRG: 871 | End: 2019-08-26
Payer: MEDICARE

## 2019-08-26 VITALS
HEIGHT: 68 IN | OXYGEN SATURATION: 93 % | RESPIRATION RATE: 18 BRPM | BODY MASS INDEX: 28.19 KG/M2 | WEIGHT: 186 LBS | TEMPERATURE: 97.8 F | DIASTOLIC BLOOD PRESSURE: 52 MMHG | SYSTOLIC BLOOD PRESSURE: 92 MMHG | HEART RATE: 76 BPM

## 2019-08-26 LAB
ACANTHOCYTES: ABNORMAL
ALBUMIN SERPL-MCNC: 3.6 G/DL (ref 3.5–5.2)
ALP BLD-CCNC: 75 U/L (ref 40–129)
ALT SERPL-CCNC: 15 U/L (ref 0–40)
ANION GAP SERPL CALCULATED.3IONS-SCNC: 13 MMOL/L (ref 7–16)
ANISOCYTOSIS: ABNORMAL
AST SERPL-CCNC: 17 U/L (ref 0–39)
BASOPHILS ABSOLUTE: 0.02 E9/L (ref 0–0.2)
BASOPHILS RELATIVE PERCENT: 0.1 % (ref 0–2)
BILIRUB SERPL-MCNC: 3.7 MG/DL (ref 0–1.2)
BUN BLDV-MCNC: 39 MG/DL (ref 8–23)
CALCIUM SERPL-MCNC: 9.1 MG/DL (ref 8.6–10.2)
CHLORIDE BLD-SCNC: 101 MMOL/L (ref 98–107)
CO2: 24 MMOL/L (ref 22–29)
CREAT SERPL-MCNC: 2 MG/DL (ref 0.7–1.2)
DIGOXIN LEVEL: 0.6 NG/ML (ref 0.8–2)
EOSINOPHILS ABSOLUTE: 0 E9/L (ref 0.05–0.5)
EOSINOPHILS RELATIVE PERCENT: 0 % (ref 0–6)
GFR AFRICAN AMERICAN: 39
GFR NON-AFRICAN AMERICAN: 32 ML/MIN/1.73
GLUCOSE BLD-MCNC: 130 MG/DL (ref 74–99)
HCT VFR BLD CALC: 36.3 % (ref 37–54)
HEMOGLOBIN: 12 G/DL (ref 12.5–16.5)
IMMATURE GRANULOCYTES #: 0.15 E9/L
IMMATURE GRANULOCYTES %: 0.9 % (ref 0–5)
INR BLD: 2.1
LACTIC ACID: 1.6 MMOL/L (ref 0.5–2.2)
LIPASE: 54 U/L (ref 13–60)
LYMPHOCYTES ABSOLUTE: 0.82 E9/L (ref 1.5–4)
LYMPHOCYTES RELATIVE PERCENT: 4.7 % (ref 20–42)
MCH RBC QN AUTO: 30.8 PG (ref 26–35)
MCHC RBC AUTO-ENTMCNC: 33.1 % (ref 32–34.5)
MCV RBC AUTO: 93.3 FL (ref 80–99.9)
MONOCYTES ABSOLUTE: 1.63 E9/L (ref 0.1–0.95)
MONOCYTES RELATIVE PERCENT: 9.4 % (ref 2–12)
NEUTROPHILS ABSOLUTE: 14.67 E9/L (ref 1.8–7.3)
NEUTROPHILS RELATIVE PERCENT: 84.9 % (ref 43–80)
OVALOCYTES: ABNORMAL
PDW BLD-RTO: 14.9 FL (ref 11.5–15)
PLATELET # BLD: 145 E9/L (ref 130–450)
PMV BLD AUTO: 11.2 FL (ref 7–12)
POIKILOCYTES: ABNORMAL
POTASSIUM REFLEX MAGNESIUM: 4.4 MMOL/L (ref 3.5–5)
PROTHROMBIN TIME: 24 SEC (ref 9.3–12.4)
RBC # BLD: 3.89 E12/L (ref 3.8–5.8)
SCHISTOCYTES: ABNORMAL
SODIUM BLD-SCNC: 138 MMOL/L (ref 132–146)
TOTAL PROTEIN: 6.7 G/DL (ref 6.4–8.3)
TROPONIN: 0.03 NG/ML (ref 0–0.03)
WBC # BLD: 17.3 E9/L (ref 4.5–11.5)

## 2019-08-26 PROCEDURE — 36415 COLL VENOUS BLD VENIPUNCTURE: CPT

## 2019-08-26 PROCEDURE — 83690 ASSAY OF LIPASE: CPT

## 2019-08-26 PROCEDURE — 93005 ELECTROCARDIOGRAM TRACING: CPT | Performed by: NURSE PRACTITIONER

## 2019-08-26 PROCEDURE — 80053 COMPREHEN METABOLIC PANEL: CPT

## 2019-08-26 PROCEDURE — 85610 PROTHROMBIN TIME: CPT

## 2019-08-26 PROCEDURE — 83605 ASSAY OF LACTIC ACID: CPT

## 2019-08-26 PROCEDURE — 4500000002 HC ER NO CHARGE

## 2019-08-26 PROCEDURE — 85025 COMPLETE CBC W/AUTO DIFF WBC: CPT

## 2019-08-26 PROCEDURE — 84484 ASSAY OF TROPONIN QUANT: CPT

## 2019-08-26 PROCEDURE — 80162 ASSAY OF DIGOXIN TOTAL: CPT

## 2019-08-27 ENCOUNTER — HOSPITAL ENCOUNTER (INPATIENT)
Age: 84
LOS: 8 days | Discharge: HOME | DRG: 871 | End: 2019-09-04
Attending: EMERGENCY MEDICINE | Admitting: STUDENT IN AN ORGANIZED HEALTH CARE EDUCATION/TRAINING PROGRAM
Payer: MEDICARE

## 2019-08-27 ENCOUNTER — APPOINTMENT (OUTPATIENT)
Dept: CT IMAGING | Age: 84
DRG: 871 | End: 2019-08-27
Payer: MEDICARE

## 2019-08-27 ENCOUNTER — APPOINTMENT (OUTPATIENT)
Dept: GENERAL RADIOLOGY | Age: 84
DRG: 871 | End: 2019-08-27
Payer: MEDICARE

## 2019-08-27 DIAGNOSIS — I48.91 ATRIAL FIBRILLATION, UNSPECIFIED TYPE (HCC): Chronic | ICD-10-CM

## 2019-08-27 DIAGNOSIS — R18.8 OTHER ASCITES: ICD-10-CM

## 2019-08-27 DIAGNOSIS — J18.9 PNEUMONIA DUE TO ORGANISM: ICD-10-CM

## 2019-08-27 DIAGNOSIS — R65.21 SEPTIC SHOCK (HCC): Primary | ICD-10-CM

## 2019-08-27 DIAGNOSIS — I87.1 HEPATIC VEIN STENOSIS: ICD-10-CM

## 2019-08-27 DIAGNOSIS — A41.9 SEPTIC SHOCK (HCC): Primary | ICD-10-CM

## 2019-08-27 PROBLEM — F10.21 ALCOHOLISM IN REMISSION (HCC): Status: ACTIVE | Noted: 2019-08-27

## 2019-08-27 LAB
ABO/RH: NORMAL
ACANTHOCYTES: ABNORMAL
ALBUMIN FLUID: 2.5 G/DL
ALBUMIN SERPL-MCNC: 3.6 G/DL (ref 3.5–5.2)
ALP BLD-CCNC: 108 U/L (ref 40–129)
ALT SERPL-CCNC: 19 U/L (ref 0–40)
AMMONIA: 24 UMOL/L (ref 16–60)
AMYLASE FLUID: 15 U/L
ANION GAP SERPL CALCULATED.3IONS-SCNC: 17 MMOL/L (ref 7–16)
ANISOCYTOSIS: ABNORMAL
ANTIBODY SCREEN: NORMAL
APPEARANCE FLUID: NORMAL
APTT: 31.7 SEC (ref 24.5–35.1)
AST SERPL-CCNC: 32 U/L (ref 0–39)
B.E.: -5.7 MMOL/L (ref -3–3)
BACTERIA: ABNORMAL /HPF
BASOPHILS ABSOLUTE: 0.01 E9/L (ref 0–0.2)
BASOPHILS RELATIVE PERCENT: 0.1 % (ref 0–2)
BILIRUB SERPL-MCNC: 3.7 MG/DL (ref 0–1.2)
BILIRUBIN DIRECT: 1.5 MG/DL (ref 0–0.3)
BILIRUBIN URINE: ABNORMAL
BLOOD BANK DISPENSE STATUS: NORMAL
BLOOD BANK DISPENSE STATUS: NORMAL
BLOOD BANK PRODUCT CODE: NORMAL
BLOOD BANK PRODUCT CODE: NORMAL
BLOOD, URINE: ABNORMAL
BPU ID: NORMAL
BPU ID: NORMAL
BUN BLDV-MCNC: 51 MG/DL (ref 8–23)
BURR CELLS: ABNORMAL
CALCIUM SERPL-MCNC: 8.8 MG/DL (ref 8.6–10.2)
CELL COUNT FLUID TYPE: NORMAL
CHLORIDE BLD-SCNC: 102 MMOL/L (ref 98–107)
CHLORIDE URINE RANDOM: <20 MMOL/L
CK MB: 3 NG/ML (ref 0–7.7)
CLARITY: ABNORMAL
CO2: 21 MMOL/L (ref 22–29)
COHB: 1.1 % (ref 0–1.5)
COLOR FLUID: YELLOW
COLOR: YELLOW
CREAT SERPL-MCNC: 2.4 MG/DL (ref 0.7–1.2)
CREATININE URINE: 209 MG/DL (ref 40–278)
CRITICAL: ABNORMAL
DATE ANALYZED: ABNORMAL
DATE OF COLLECTION: ABNORMAL
DESCRIPTION BLOOD BANK: NORMAL
DESCRIPTION BLOOD BANK: NORMAL
EKG ATRIAL RATE: 75 BPM
EKG ATRIAL RATE: 85 BPM
EKG Q-T INTERVAL: 304 MS
EKG Q-T INTERVAL: 344 MS
EKG QRS DURATION: 106 MS
EKG QRS DURATION: 140 MS
EKG QTC CALCULATION (BAZETT): 339 MS
EKG QTC CALCULATION (BAZETT): 474 MS
EKG R AXIS: -49 DEGREES
EKG R AXIS: -99 DEGREES
EKG T AXIS: 67 DEGREES
EKG T AXIS: 7 DEGREES
EKG VENTRICULAR RATE: 114 BPM
EKG VENTRICULAR RATE: 75 BPM
EOSINOPHILS ABSOLUTE: 0 E9/L (ref 0.05–0.5)
EOSINOPHILS RELATIVE PERCENT: 0 % (ref 0–6)
FILM ARRAY ADENOVIRUS: NORMAL
FILM ARRAY BORDETELLA PERTUSSIS: NORMAL
FILM ARRAY CHLAMYDOPHILIA PNEUMONIAE: NORMAL
FILM ARRAY CORONAVIRUS 229E: NORMAL
FILM ARRAY CORONAVIRUS HKU1: NORMAL
FILM ARRAY CORONAVIRUS NL63: NORMAL
FILM ARRAY CORONAVIRUS OC43: NORMAL
FILM ARRAY INFLUENZA A VIRUS 09H1: NORMAL
FILM ARRAY INFLUENZA A VIRUS H1: NORMAL
FILM ARRAY INFLUENZA A VIRUS H3: NORMAL
FILM ARRAY INFLUENZA A VIRUS: NORMAL
FILM ARRAY INFLUENZA B: NORMAL
FILM ARRAY METAPNEUMOVIRUS: NORMAL
FILM ARRAY MYCOPLASMA PNEUMONIAE: NORMAL
FILM ARRAY PARAINFLUENZA VIRUS 1: NORMAL
FILM ARRAY PARAINFLUENZA VIRUS 2: NORMAL
FILM ARRAY PARAINFLUENZA VIRUS 3: NORMAL
FILM ARRAY PARAINFLUENZA VIRUS 4: NORMAL
FILM ARRAY RESPIRATORY SYNCITIAL VIRUS: NORMAL
FILM ARRAY RHINOVIRUS/ENTEROVIRUS: NORMAL
FLUID TYPE: NORMAL
GFR AFRICAN AMERICAN: 32
GFR NON-AFRICAN AMERICAN: 26 ML/MIN/1.73
GLUCOSE BLD-MCNC: 94 MG/DL (ref 74–99)
GLUCOSE URINE: NEGATIVE MG/DL
GLUCOSE, FLUID: 123 MG/DL
HCO3: 18.7 MMOL/L (ref 22–26)
HCT VFR BLD CALC: 34.6 % (ref 37–54)
HEMOGLOBIN: 11.9 G/DL (ref 12.5–16.5)
HHB: 4 % (ref 0–5)
IMMATURE GRANULOCYTES #: 0.08 E9/L
IMMATURE GRANULOCYTES %: 1.1 % (ref 0–5)
INR BLD: 2.1
KETONES, URINE: NEGATIVE MG/DL
LAB: ABNORMAL
LACTATE DEHYDROGENASE: 251 U/L (ref 135–225)
LACTIC ACID, SEPSIS: 1.2 MMOL/L (ref 0.5–1.9)
LACTIC ACID, SEPSIS: 3.3 MMOL/L (ref 0.5–1.9)
LD, FLUID: 109 U/L
LEUKOCYTE ESTERASE, URINE: NEGATIVE
LIPASE: 58 U/L (ref 13–60)
LYMPHOCYTES ABSOLUTE: 0.22 E9/L (ref 1.5–4)
LYMPHOCYTES RELATIVE PERCENT: 3 % (ref 20–42)
Lab: ABNORMAL
MCH RBC QN AUTO: 31.2 PG (ref 26–35)
MCHC RBC AUTO-ENTMCNC: 34.4 % (ref 32–34.5)
MCV RBC AUTO: 90.8 FL (ref 80–99.9)
METHB: 0 % (ref 0–1.5)
MODE: ABNORMAL
MONOCYTE, FLUID: 55 %
MONOCYTES ABSOLUTE: 0.16 E9/L (ref 0.1–0.95)
MONOCYTES RELATIVE PERCENT: 2.2 % (ref 2–12)
NEUTROPHIL, FLUID: 45 %
NEUTROPHILS ABSOLUTE: 6.9 E9/L (ref 1.8–7.3)
NEUTROPHILS RELATIVE PERCENT: 93.6 % (ref 43–80)
NITRITE, URINE: POSITIVE
NUCLEATED CELLS FLUID: 1308 /UL
O2 CONTENT: 16.1 ML/DL
O2 SATURATION: 96 % (ref 92–98.5)
O2HB: 94.9 % (ref 94–97)
OPERATOR ID: ABNORMAL
OVALOCYTES: ABNORMAL
PATIENT TEMP: 37 C
PCO2: 33 MMHG (ref 35–45)
PDW BLD-RTO: 14.6 FL (ref 11.5–15)
PH BLOOD GAS: 7.37 (ref 7.35–7.45)
PH UA: 5 (ref 5–9)
PLATELET # BLD: 129 E9/L (ref 130–450)
PMV BLD AUTO: 11.4 FL (ref 7–12)
PO2: 92.1 MMHG (ref 60–100)
POIKILOCYTES: ABNORMAL
POLYCHROMASIA: ABNORMAL
POTASSIUM REFLEX MAGNESIUM: 4 MMOL/L (ref 3.5–5)
POTASSIUM, UR: 75.6 MMOL/L
PROCALCITONIN: 9.56 NG/ML (ref 0–0.08)
PROTEIN FLUID: 4 G/DL
PROTEIN UA: 100 MG/DL
PROTHROMBIN TIME: 24.4 SEC (ref 9.3–12.4)
RBC # BLD: 3.81 E12/L (ref 3.8–5.8)
RBC FLUID: 5000 /UL
RBC UA: ABNORMAL /HPF (ref 0–2)
RENAL EPITHELIAL, UA: ABNORMAL /HPF
SCHISTOCYTES: ABNORMAL
SODIUM BLD-SCNC: 140 MMOL/L (ref 132–146)
SODIUM URINE: <20 MMOL/L
SOURCE, BLOOD GAS: ABNORMAL
SPECIFIC GRAVITY UA: >=1.03 (ref 1–1.03)
THB: 12 G/DL (ref 11.5–16.5)
TIME ANALYZED: 602
TOTAL CK: 383 U/L (ref 20–200)
TOTAL PROTEIN: 6.5 G/DL (ref 6.4–8.3)
TROPONIN: 0.03 NG/ML (ref 0–0.03)
TROPONIN: 0.08 NG/ML (ref 0–0.03)
TROPONIN: 0.09 NG/ML (ref 0–0.03)
UREA NITROGEN, UR: 723 MG/DL (ref 800–1666)
UROBILINOGEN, URINE: 0.2 E.U./DL
VACUOLATED NEUTROPHILS: ABNORMAL
WBC # BLD: 7.4 E9/L (ref 4.5–11.5)
WBC UA: ABNORMAL /HPF (ref 0–5)

## 2019-08-27 PROCEDURE — 88112 CYTOPATH CELL ENHANCE TECH: CPT

## 2019-08-27 PROCEDURE — 2580000003 HC RX 258: Performed by: EMERGENCY MEDICINE

## 2019-08-27 PROCEDURE — 96366 THER/PROPH/DIAG IV INF ADDON: CPT

## 2019-08-27 PROCEDURE — 82042 OTHER SOURCE ALBUMIN QUAN EA: CPT

## 2019-08-27 PROCEDURE — 87581 M.PNEUMON DNA AMP PROBE: CPT

## 2019-08-27 PROCEDURE — 87633 RESP VIRUS 12-25 TARGETS: CPT

## 2019-08-27 PROCEDURE — 87088 URINE BACTERIA CULTURE: CPT

## 2019-08-27 PROCEDURE — 36556 INSERT NON-TUNNEL CV CATH: CPT

## 2019-08-27 PROCEDURE — 82436 ASSAY OF URINE CHLORIDE: CPT

## 2019-08-27 PROCEDURE — 87077 CULTURE AEROBIC IDENTIFY: CPT

## 2019-08-27 PROCEDURE — 82550 ASSAY OF CK (CPK): CPT

## 2019-08-27 PROCEDURE — 80053 COMPREHEN METABOLIC PANEL: CPT

## 2019-08-27 PROCEDURE — 96367 TX/PROPH/DG ADDL SEQ IV INF: CPT

## 2019-08-27 PROCEDURE — 80074 ACUTE HEPATITIS PANEL: CPT

## 2019-08-27 PROCEDURE — 87186 SC STD MICRODIL/AGAR DIL: CPT

## 2019-08-27 PROCEDURE — 86850 RBC ANTIBODY SCREEN: CPT

## 2019-08-27 PROCEDURE — 84157 ASSAY OF PROTEIN OTHER: CPT

## 2019-08-27 PROCEDURE — 71045 X-RAY EXAM CHEST 1 VIEW: CPT

## 2019-08-27 PROCEDURE — 2500000003 HC RX 250 WO HCPCS: Performed by: INTERNAL MEDICINE

## 2019-08-27 PROCEDURE — 82140 ASSAY OF AMMONIA: CPT

## 2019-08-27 PROCEDURE — 6370000000 HC RX 637 (ALT 250 FOR IP): Performed by: EMERGENCY MEDICINE

## 2019-08-27 PROCEDURE — 49083 ABD PARACENTESIS W/IMAGING: CPT | Performed by: INTERNAL MEDICINE

## 2019-08-27 PROCEDURE — 87205 SMEAR GRAM STAIN: CPT

## 2019-08-27 PROCEDURE — 84145 PROCALCITONIN (PCT): CPT

## 2019-08-27 PROCEDURE — 87486 CHLMYD PNEUM DNA AMP PROBE: CPT

## 2019-08-27 PROCEDURE — 93010 ELECTROCARDIOGRAM REPORT: CPT | Performed by: INTERNAL MEDICINE

## 2019-08-27 PROCEDURE — 36430 TRANSFUSION BLD/BLD COMPNT: CPT

## 2019-08-27 PROCEDURE — 6370000000 HC RX 637 (ALT 250 FOR IP): Performed by: INTERNAL MEDICINE

## 2019-08-27 PROCEDURE — 93005 ELECTROCARDIOGRAM TRACING: CPT | Performed by: EMERGENCY MEDICINE

## 2019-08-27 PROCEDURE — 6360000002 HC RX W HCPCS: Performed by: INTERNAL MEDICINE

## 2019-08-27 PROCEDURE — 2580000003 HC RX 258: Performed by: INTERNAL MEDICINE

## 2019-08-27 PROCEDURE — 6360000002 HC RX W HCPCS: Performed by: EMERGENCY MEDICINE

## 2019-08-27 PROCEDURE — 36415 COLL VENOUS BLD VENIPUNCTURE: CPT

## 2019-08-27 PROCEDURE — 02HV33Z INSERTION OF INFUSION DEVICE INTO SUPERIOR VENA CAVA, PERCUTANEOUS APPROACH: ICD-10-PCS | Performed by: EMERGENCY MEDICINE

## 2019-08-27 PROCEDURE — 2500000003 HC RX 250 WO HCPCS: Performed by: EMERGENCY MEDICINE

## 2019-08-27 PROCEDURE — 0W9G3ZZ DRAINAGE OF PERITONEAL CAVITY, PERCUTANEOUS APPROACH: ICD-10-PCS | Performed by: INTERNAL MEDICINE

## 2019-08-27 PROCEDURE — 74176 CT ABD & PELVIS W/O CONTRAST: CPT

## 2019-08-27 PROCEDURE — 87450 HC DIRECT STREP B ANTIGEN: CPT

## 2019-08-27 PROCEDURE — 88305 TISSUE EXAM BY PATHOLOGIST: CPT

## 2019-08-27 PROCEDURE — 84540 ASSAY OF URINE/UREA-N: CPT

## 2019-08-27 PROCEDURE — 87040 BLOOD CULTURE FOR BACTERIA: CPT

## 2019-08-27 PROCEDURE — 96365 THER/PROPH/DIAG IV INF INIT: CPT

## 2019-08-27 PROCEDURE — 83605 ASSAY OF LACTIC ACID: CPT

## 2019-08-27 PROCEDURE — 85025 COMPLETE CBC W/AUTO DIFF WBC: CPT

## 2019-08-27 PROCEDURE — 87070 CULTURE OTHR SPECIMN AEROBIC: CPT

## 2019-08-27 PROCEDURE — 84484 ASSAY OF TROPONIN QUANT: CPT

## 2019-08-27 PROCEDURE — 85730 THROMBOPLASTIN TIME PARTIAL: CPT

## 2019-08-27 PROCEDURE — 84300 ASSAY OF URINE SODIUM: CPT

## 2019-08-27 PROCEDURE — P9059 PLASMA, FRZ BETWEEN 8-24HOUR: HCPCS

## 2019-08-27 PROCEDURE — 82805 BLOOD GASES W/O2 SATURATION: CPT

## 2019-08-27 PROCEDURE — 2000000000 HC ICU R&B

## 2019-08-27 PROCEDURE — 82248 BILIRUBIN DIRECT: CPT

## 2019-08-27 PROCEDURE — 86901 BLOOD TYPING SEROLOGIC RH(D): CPT

## 2019-08-27 PROCEDURE — 82150 ASSAY OF AMYLASE: CPT

## 2019-08-27 PROCEDURE — 99285 EMERGENCY DEPT VISIT HI MDM: CPT

## 2019-08-27 PROCEDURE — 84133 ASSAY OF URINE POTASSIUM: CPT

## 2019-08-27 PROCEDURE — 82570 ASSAY OF URINE CREATININE: CPT

## 2019-08-27 PROCEDURE — 82553 CREATINE MB FRACTION: CPT

## 2019-08-27 PROCEDURE — 89051 BODY FLUID CELL COUNT: CPT

## 2019-08-27 PROCEDURE — 82947 ASSAY GLUCOSE BLOOD QUANT: CPT

## 2019-08-27 PROCEDURE — P9047 ALBUMIN (HUMAN), 25%, 50ML: HCPCS | Performed by: INTERNAL MEDICINE

## 2019-08-27 PROCEDURE — 81001 URINALYSIS AUTO W/SCOPE: CPT

## 2019-08-27 PROCEDURE — 85610 PROTHROMBIN TIME: CPT

## 2019-08-27 PROCEDURE — 83615 LACTATE (LD) (LDH) ENZYME: CPT

## 2019-08-27 PROCEDURE — 86900 BLOOD TYPING SEROLOGIC ABO: CPT

## 2019-08-27 PROCEDURE — 83690 ASSAY OF LIPASE: CPT

## 2019-08-27 PROCEDURE — 87798 DETECT AGENT NOS DNA AMP: CPT

## 2019-08-27 RX ORDER — SODIUM CHLORIDE 0.9 % (FLUSH) 0.9 %
10 SYRINGE (ML) INJECTION PRN
Status: DISCONTINUED | OUTPATIENT
Start: 2019-08-27 | End: 2019-08-27 | Stop reason: SDUPTHER

## 2019-08-27 RX ORDER — DEXTROSE MONOHYDRATE 25 G/50ML
12.5 INJECTION, SOLUTION INTRAVENOUS PRN
Status: DISCONTINUED | OUTPATIENT
Start: 2019-08-27 | End: 2019-09-04 | Stop reason: HOSPADM

## 2019-08-27 RX ORDER — SODIUM CHLORIDE 0.9 % (FLUSH) 0.9 %
10 SYRINGE (ML) INJECTION PRN
Status: DISCONTINUED | OUTPATIENT
Start: 2019-08-27 | End: 2019-09-04 | Stop reason: HOSPADM

## 2019-08-27 RX ORDER — ALBUMIN (HUMAN) 12.5 G/50ML
50 SOLUTION INTRAVENOUS ONCE
Status: COMPLETED | OUTPATIENT
Start: 2019-08-27 | End: 2019-08-27

## 2019-08-27 RX ORDER — ACETAMINOPHEN 325 MG/1
650 TABLET ORAL ONCE
Status: COMPLETED | OUTPATIENT
Start: 2019-08-27 | End: 2019-08-27

## 2019-08-27 RX ORDER — LIDOCAINE HYDROCHLORIDE 10 MG/ML
INJECTION, SOLUTION EPIDURAL; INFILTRATION; INTRACAUDAL; PERINEURAL
Status: DISPENSED
Start: 2019-08-27 | End: 2019-08-27

## 2019-08-27 RX ORDER — SODIUM CHLORIDE 0.9 % (FLUSH) 0.9 %
10 SYRINGE (ML) INJECTION EVERY 12 HOURS SCHEDULED
Status: DISCONTINUED | OUTPATIENT
Start: 2019-08-27 | End: 2019-08-27 | Stop reason: SDUPTHER

## 2019-08-27 RX ORDER — 0.9 % SODIUM CHLORIDE 0.9 %
30 INTRAVENOUS SOLUTION INTRAVENOUS ONCE
Status: COMPLETED | OUTPATIENT
Start: 2019-08-27 | End: 2019-08-27

## 2019-08-27 RX ORDER — WARFARIN SODIUM 5 MG/1
5 TABLET ORAL DAILY
COMMUNITY

## 2019-08-27 RX ORDER — 0.9 % SODIUM CHLORIDE 0.9 %
250 INTRAVENOUS SOLUTION INTRAVENOUS ONCE
Status: DISCONTINUED | OUTPATIENT
Start: 2019-08-27 | End: 2019-08-29

## 2019-08-27 RX ORDER — 0.9 % SODIUM CHLORIDE 0.9 %
1000 INTRAVENOUS SOLUTION INTRAVENOUS ONCE
Status: COMPLETED | OUTPATIENT
Start: 2019-08-27 | End: 2019-08-27

## 2019-08-27 RX ORDER — FLUDROCORTISONE ACETATE 0.1 MG/1
0.1 TABLET ORAL DAILY
Status: DISCONTINUED | OUTPATIENT
Start: 2019-08-27 | End: 2019-08-29

## 2019-08-27 RX ORDER — SODIUM CHLORIDE 0.9 % (FLUSH) 0.9 %
10 SYRINGE (ML) INJECTION EVERY 12 HOURS SCHEDULED
Status: DISCONTINUED | OUTPATIENT
Start: 2019-08-27 | End: 2019-09-04 | Stop reason: HOSPADM

## 2019-08-27 RX ORDER — ALBUMIN (HUMAN) 12.5 G/50ML
75 SOLUTION INTRAVENOUS ONCE
Status: COMPLETED | OUTPATIENT
Start: 2019-08-27 | End: 2019-08-27

## 2019-08-27 RX ORDER — NICOTINE POLACRILEX 4 MG
15 LOZENGE BUCCAL PRN
Status: DISCONTINUED | OUTPATIENT
Start: 2019-08-27 | End: 2019-09-04 | Stop reason: HOSPADM

## 2019-08-27 RX ORDER — DEXTROSE MONOHYDRATE 50 MG/ML
100 INJECTION, SOLUTION INTRAVENOUS PRN
Status: DISCONTINUED | OUTPATIENT
Start: 2019-08-27 | End: 2019-09-04 | Stop reason: HOSPADM

## 2019-08-27 RX ORDER — WARFARIN SODIUM 7.5 MG/1
7.5 TABLET ORAL SEE ADMIN INSTRUCTIONS
COMMUNITY
End: 2019-01-01 | Stop reason: ALTCHOICE

## 2019-08-27 RX ORDER — ONDANSETRON 2 MG/ML
4 INJECTION INTRAMUSCULAR; INTRAVENOUS EVERY 6 HOURS PRN
Status: DISCONTINUED | OUTPATIENT
Start: 2019-08-27 | End: 2019-09-04 | Stop reason: HOSPADM

## 2019-08-27 RX ADMIN — ALBUMIN (HUMAN) 50 G: 0.25 INJECTION, SOLUTION INTRAVENOUS at 17:52

## 2019-08-27 RX ADMIN — ACETAMINOPHEN 650 MG: 325 TABLET, FILM COATED ORAL at 05:46

## 2019-08-27 RX ADMIN — HYDROCORTISONE SODIUM SUCCINATE 50 MG: 100 INJECTION, POWDER, FOR SOLUTION INTRAMUSCULAR; INTRAVENOUS at 21:00

## 2019-08-27 RX ADMIN — THIAMINE HYDROCHLORIDE 200 MG: 100 INJECTION, SOLUTION INTRAMUSCULAR; INTRAVENOUS at 14:42

## 2019-08-27 RX ADMIN — CEFEPIME HYDROCHLORIDE 2 G: 2 INJECTION, POWDER, FOR SOLUTION INTRAVENOUS at 03:35

## 2019-08-27 RX ADMIN — HYDROCORTISONE SODIUM SUCCINATE 50 MG: 100 INJECTION, POWDER, FOR SOLUTION INTRAMUSCULAR; INTRAVENOUS at 14:40

## 2019-08-27 RX ADMIN — HYDROCORTISONE SODIUM SUCCINATE 100 MG: 100 INJECTION, POWDER, FOR SOLUTION INTRAMUSCULAR; INTRAVENOUS at 10:27

## 2019-08-27 RX ADMIN — METRONIDAZOLE 500 MG: 500 INJECTION, SOLUTION INTRAVENOUS at 12:59

## 2019-08-27 RX ADMIN — Medication 2 MCG/MIN: at 08:09

## 2019-08-27 RX ADMIN — FLUDROCORTISONE ACETATE 0.1 MG: 0.1 TABLET ORAL at 14:41

## 2019-08-27 RX ADMIN — ASCORBIC ACID 1500 MG: 500 INJECTION, SOLUTION INTRAMUSCULAR; INTRAVENOUS; SUBCUTANEOUS at 14:42

## 2019-08-27 RX ADMIN — METRONIDAZOLE 500 MG: 500 INJECTION, SOLUTION INTRAVENOUS at 04:03

## 2019-08-27 RX ADMIN — VASOPRESSIN 0.04 UNITS/MIN: 20 INJECTION INTRAVENOUS at 10:29

## 2019-08-27 RX ADMIN — METRONIDAZOLE 500 MG: 500 INJECTION, SOLUTION INTRAVENOUS at 21:43

## 2019-08-27 RX ADMIN — SODIUM CHLORIDE 2532 ML: 9 INJECTION, SOLUTION INTRAVENOUS at 03:27

## 2019-08-27 RX ADMIN — SODIUM CHLORIDE 1000 ML: 9 INJECTION, SOLUTION INTRAVENOUS at 07:55

## 2019-08-27 RX ADMIN — ASCORBIC ACID 1500 MG: 500 INJECTION, SOLUTION INTRAMUSCULAR; INTRAVENOUS; SUBCUTANEOUS at 21:00

## 2019-08-27 RX ADMIN — CEFTRIAXONE SODIUM 1 G: 1 INJECTION, POWDER, FOR SOLUTION INTRAMUSCULAR; INTRAVENOUS at 12:59

## 2019-08-27 RX ADMIN — VASOPRESSIN 0.04 UNITS/MIN: 20 INJECTION INTRAVENOUS at 18:27

## 2019-08-27 RX ADMIN — Medication 10 ML: at 21:44

## 2019-08-27 RX ADMIN — ALBUMIN (HUMAN) 75 G: 0.25 INJECTION, SOLUTION INTRAVENOUS at 16:17

## 2019-08-27 RX ADMIN — THIAMINE HYDROCHLORIDE 200 MG: 100 INJECTION, SOLUTION INTRAMUSCULAR; INTRAVENOUS at 21:43

## 2019-08-27 ASSESSMENT — ENCOUNTER SYMPTOMS
VOMITING: 0
WHEEZING: 0
ABDOMINAL PAIN: 0
TROUBLE SWALLOWING: 0
CHOKING: 0
ABDOMINAL DISTENTION: 1
BACK PAIN: 0
APNEA: 0
SHORTNESS OF BREATH: 1
DIARRHEA: 1
COLOR CHANGE: 0
ANAL BLEEDING: 0
CHEST TIGHTNESS: 0
VOICE CHANGE: 0
SORE THROAT: 0
FACIAL SWELLING: 0
NAUSEA: 0
EYE DISCHARGE: 0
BLOOD IN STOOL: 0

## 2019-08-27 ASSESSMENT — PAIN SCALES - GENERAL
PAINLEVEL_OUTOF10: 0

## 2019-08-27 NOTE — PROCEDURES
Abdominal Paracentesis       Christopher Anthony   [id number]   8/27/2019  Indication: Christopher Anthony. 82 y.o. male with Ascites, rule out spontaneous bacterial peritonitis    Pre - Operative Diagnosis:  Ascites     Post - Operative Diagnosis: Same    Performed by:  Glenn Neal DO PGY-2    Attending: Dr. Janki Eastman MD whom was present at bedside for the procedure      Consent:  Verbal & written consent obtained which included the risks/benefits/alternatives of the procedure including but not limited to: infection, bleeding, pain, & perforated bowel. We then verified the informed consent & appropriate time out prior to the protocol was completed. Procedure Details:  Patient understanding: patient states understanding of the procedure being performed. Time out: Immediately prior to procedure a \"time out\" was called to verify the correct patient and procedure. Patient was place in the supine semi-upright position and ultrasound was done and site of maximum fluid collection was marked. Preparation: Patient was prepped and draped in the usual sterile fashion. Local anesthesia used: Yes    Local anesthetic: Lidocaine 1% without epinephrine    TECHNIQUE: The patient was placed in the correct positon. Ultrasound was utilized by me for direct visualization of ascitic fluid. The skin was prepped and draped with Chloraprep solution and sterile drapes were utilized. 1% buffered lidocaine was used to anesthetize the skin, subcutaneous tissue and peritoneum. Then a 5 Fr. catheter was placed into the fluid pocket using standard technique. The fluid was obtained without any difficulties and minimal blood loss. A total of 5500 cc ascitic fluid fluid was removed. The fluid was straw colored/clear in color. A dressing was applied to the incision area. The fluid was sent for analysis. Findings  We removed 5500 ml of clear ascites fluid. Albumin administration was given.   A sample was sent to Pathology for cytogenetics, flow, and cell counts, as well as for infection analysis if needed. Complications:  None; patient tolerated the procedure well. Condition: stable    Plan: Post procedure orders & cultures obtain at bedside.  Follow up on testing studies    Miriam Evans DO, PGY-2  8/27/2019 5:33 PM  I was present   Татьяна Holden

## 2019-08-27 NOTE — H&P
Positive for abdominal distention and diarrhea. Negative for abdominal pain, anal bleeding, blood in stool, nausea and vomiting. Endocrine: Negative for cold intolerance. Genitourinary: Negative for dysuria. Musculoskeletal: Negative for arthralgias, back pain, gait problem and joint swelling. Skin: Negative for color change. Allergic/Immunologic: Negative for immunocompromised state. Neurological: Negative for weakness, light-headedness, numbness and headaches. Psychiatric/Behavioral: Negative for agitation, behavioral problems, decreased concentration, dysphoric mood and hallucinations. Physical Exam     Physical Exam   Constitutional: He is oriented to person, place, and time. He appears distressed. HENT:   Head: Normocephalic and atraumatic. Eyes: Pupils are equal, round, and reactive to light. Conjunctivae and EOM are normal. Right eye exhibits no discharge. Left eye exhibits no discharge. No scleral icterus. Neck: Normal range of motion. No JVD present. No tracheal deviation present. No thyromegaly present. Cardiovascular: Normal rate, regular rhythm and normal heart sounds. Exam reveals no gallop and no friction rub. No murmur heard. distal pulses weak, symmetrical   Pulmonary/Chest: Effort normal and breath sounds normal. No respiratory distress. He has no wheezes. He has no rales. He exhibits no tenderness. Abdominal: He exhibits distension. He exhibits no mass. There is no tenderness. There is no rebound and no guarding. Musculoskeletal: He exhibits edema and tenderness. He exhibits no deformity. 3+ edema right leg mid-shin, +2 edema left leg mid-shin   Lymphadenopathy:     He has no cervical adenopathy. Neurological: He is alert and oriented to person, place, and time. He exhibits normal muscle tone. Skin: Skin is warm and dry. He is not diaphoretic. No erythema. pale-yellow skin on abdomen   Psychiatric: He has a normal mood and affect.  His behavior is - 99 mg/dL     BUN 51 (H) 8 - 23 mg/dL     CREATININE 2.4 (H) 0.7 - 1.2 mg/dL     GFR Non-African American 26 >=60 mL/min/1.73     GFR African American 32       Calcium 8.8 8.6 - 10.2 mg/dL     Total Protein 6.5 6.4 - 8.3 g/dL     Alb 3.6 3.5 - 5.2 g/dL     Total Bilirubin 3.7 (H) 0.0 - 1.2 mg/dL     Alkaline Phosphatase 108 40 - 129 U/L     ALT 19 0 - 40 U/L     AST 32 0 - 39 U/L   Protime-INR   Result Value Ref Range     Protime 24.4 (H) 9.3 - 12.4 sec     INR 2.1     Lipase   Result Value Ref Range     Lipase 58 13 - 60 U/L   APTT   Result Value Ref Range     aPTT 31.7 24.5 - 35.1 sec   Ammonia   Result Value Ref Range     Ammonia 24.0 16.0 - 60.0 umol/L   Blood Gas, Arterial   Result Value Ref Range     Date Analyzed [id number]       Time Analyzed 602       Source: Blood Arterial       pH, Blood Gas 7.371 7.350 - 7.450     PCO2 33.0 (L) 35.0 - 45.0 mmHg     PO2 92.1 60.0 - 100.0 mmHg     HCO3 18.7 (L) 22.0 - 26.0 mmol/L     B.E. -5.7 (L) -3.0 - 3.0 mmol/L     O2 Sat 96.0 92.0 - 98.5 %     O2Hb 94.9 94.0 - 97.0 %     COHb 1.1 0.0 - 1.5 %     MetHb 0.0 0.0 - 1.5 %     O2 Content 16.1 mL/dL     HHb 4.0 0.0 - 5.0 %     tHb (est) 12.0 11.5 - 16.5 g/dL     Mode NC-  4L       Date Of Collection         Time Collected         Pt Temp 37.0 C      ID [id number]       Lab 35079       Critical(s) Notified .  No Critical Values     Troponin   Result Value Ref Range     Troponin 0.08 (H) 0.00 - 0.03 ng/mL   EKG 12 lead   Result Value Ref Range     Ventricular Rate 114 BPM     Atrial Rate 85 BPM     QRS Duration 140 ms     Q-T Interval 344 ms     QTc Calculation (Bazett) 474 ms     R Axis -99 degrees     T Axis           Imaging Studies:    Ct Abdomen Pelvis Wo Contrast    Result Date: 2019  Patient MRN:  [id number] : 1935 Age: 82 years Gender: Male Order Date:  2019 7:45 AM EXAM: CT ABDOMEN PELVIS WO CONTRAST NUMBER OF IMAGES \ views:  388 INDICATION:  abdominal pain COMPARISON: None Technique: Low-dose CT acquisition technique included one of following options; 1 . Automated exposure control, 2. Adjustment of MA and or KV according to patient's size or 3. Use of iterative reconstruction. Multiple computerized tomography sections of the abdomen with sagittal and coronal MPR reconstructions were obtained from the top of the diaphragm to the pelvis. The visualized portions of the abdomen reveal: The lung bases are abnormal. There are bilateral infiltrates at the lung bases, likely related to atelectasis  . [place] The liver is abnormal. There is findings to suggest cirrhosis. There is evidence to suggest cholecystitis. There is multiple lesions in the liver suspicious for metastatic disease. The spleen is unremarkable. The kidneys are unremarkable The adrenals is  unremarkable. The pancreas is unremarkable. The appendix is not seen The bowel is  unremarkable. The bladder is unremarkable. There is a moderate amount of ascites     There are bilateral infiltrates at the lung bases, likely related to atelectasis  There is findings to suggest cirrhosis Cholelithiasis Ascites Multiple lesions in the liver     Xr Chest Portable    Result Date: 2019  Patient MRN:  [id number] : 1935 Age: 82 years Gender: Male Order Date:  2019 8:45 AM EXAM: XR CHEST PORTABLE one image INDICATION:  IJ placement IJ placement COMPARISON: 2019 FINDINGS: There is cardiomegaly. There is mild vascular congestion with minimal atelectasis and tiny pleural effusions in the lung bases. Left subclavian pacemaker is unchanged. Right IJ central line is present with the tip in the superior vena cava. There is no pneumothorax. Mild CHF without edema. Right IJ central line in the superior vena cava without complications.      Xr Chest Portable    Result Date: 2019  Patient MRN: [id number] : 1935 Age:  82 years Gender: Male Order Date: 2019 3:30 AM Exam: XR CHEST PORTABLE Number of Images: 1 view Indication:   fever

## 2019-08-27 NOTE — ED NOTES
Bleeding/ swelling noted from CVC site, Dr Chan Stable in to evaluate.  Pt is on Coumadin and this finding should be expected per doctor     Melvin Ivory RN  08/27/19 0075

## 2019-08-27 NOTE — CONSULTS
bulging flanks, (+) ascites  · Extremities: no cyanosis and no clubbing  · Musculoskeletal: normal range of motion, no joint swelling, deformity or tenderness  · Neurologic: no cranial nerve deficit and speech normal     Lines     site day   TLC R IJ  8/27/19     Oxygen:     nasal canula 4  at L/min/face mask     ABG:     Lab Results   Component Value Date    PH 7.371 08/27/2019    PCO2 33.0 08/27/2019    PO2 92.1 08/27/2019    HCO3 18.7 08/27/2019    BE -5.7 08/27/2019    THB 12.0 08/27/2019    O2SAT 96.0 08/27/2019     Labs and Imaging Studies   Basic Labs  CBC with Differential:    Lab Results   Component Value Date    WBC 7.4 08/27/2019    RBC 3.81 08/27/2019    HGB 11.9 08/27/2019    HCT 34.6 08/27/2019     08/27/2019    MCV 90.8 08/27/2019    MCH 31.2 08/27/2019    MCHC 34.4 08/27/2019    RDW 14.6 08/27/2019    LYMPHOPCT 3.0 08/27/2019    MONOPCT 2.2 08/27/2019    BASOPCT 0.1 08/27/2019    MONOSABS 0.16 08/27/2019    LYMPHSABS 0.22 08/27/2019    EOSABS 0.00 08/27/2019    BASOSABS 0.01 08/27/2019     CMP:    Lab Results   Component Value Date     08/27/2019    K 4.0 08/27/2019     08/27/2019    CO2 21 08/27/2019    BUN 51 08/27/2019    CREATININE 2.4 08/27/2019    GFRAA 32 08/27/2019    LABGLOM 26 08/27/2019    GLUCOSE 94 08/27/2019    GLUCOSE 110 05/30/2012    PROT 6.5 08/27/2019    LABALBU 3.6 08/27/2019    LABALBU 4.2 05/30/2012    CALCIUM 8.8 08/27/2019    BILITOT 3.7 08/27/2019    ALKPHOS 108 08/27/2019    AST 32 08/27/2019    ALT 19 08/27/2019     BUN/Creatinine:    Lab Results   Component Value Date    BUN 51 08/27/2019    CREATININE 2.4 08/27/2019     Hepatic Function Panel:    Lab Results   Component Value Date    ALKPHOS 108 08/27/2019    ALT 19 08/27/2019    AST 32 08/27/2019    PROT 6.5 08/27/2019    BILITOT 3.7 08/27/2019    BILIDIR 1.5 08/27/2019    IBILI 1.5 12/06/2018    LABALBU 3.6 08/27/2019    LABALBU 4.2 05/30/2012     Albumin:    Lab Results   Component Value Date medication list were reviewed by me independently. I spoke with bedside nursing, therapists and consultants. Critical care services and times documented are independent of procedures and multidisciplinary rounds with Residents. Additionally comprehensive, multidisciplinary rounds were conducted with the MICU team. The case was discussed in detail and plans for care were established. Review of Residents documentation was conducted and revisions were made as appropriate. I agree with the above documented exam, problem list and plan of care. Sepsis ? Etiology r/o C diff/SBP on Flagyl ,check C diff   Liver cancer vs other etiology ,we drain 5 L of ascitic fluid and we gave albumin  Pressors  Assess fluid stats  Cover with abx  Pan culture  Hold coumadin   Work up for sepsis and assess fluid response   CXR as bellow  HELENA r/o hepatorenal syndrome     .   Malcolm Rosas

## 2019-08-27 NOTE — ED NOTES
Blood cultures obtained from right forearm, per policy. Set (one of two) drawn at 0330 by this RN. Blood cultures obtained from left AC, per policy. Set (two of two) drawn at [phone] by Tessy Silverman.                     [id number] [address], RN  08/27/19 0004

## 2019-08-27 NOTE — ED PROVIDER NOTES
A large bore needle was used to identify the vein. A guide wire was then inserted into the vein through the needle. A triple lumen catheter was then inserted into the vessel over the guide wire using the Seldinger technique. All ports showed good, free flowing blood return and were flushed with saline solution. The catheter was then securely fastened to the skin with suture at 16 cm. Two sutures were placed into the kit included tube clamp, proximal eyelets and a suture end from each of the securing sutures was extended around the catheter and tied to the proximal eyelets as an added measure to prevent dislodgement. An antibiotic disk was placed and the site was then covered with a sterile dressing. A post procedure X-ray was ordered and showed good line position. The patient tolerated the procedure well. Complications: None              Medical Decision Making:    Patient presents with fever abdominal distention right upper quadrant pain. Patient history of alcoholism. Patient differential suspicious for acute cholecystitis versus hepatitis versus hepatic abscess versus diverticulitis. He also complained of cough. EMS reports low pulse ox reading. Patient presents with sepsis. Septic protocol was initiated. Antibiotics were given. He was given 30/kg normal saline as well as lactic acid. He was given Tylenol for his fever. Patient will be endorsed to Dr. Martha Eckert for review of diagnostics and disposition. Re-Evaluations:         10:41 AM  I received this patient at sign out from Dr. Abisai Flores. I have discussed the patient's initial exam, treatment and plan of care with the out going physician. I have introduced my self to the patient / family and have answered their questions to this point. I have examined the patient myself and reviewed ordered tests / medications and  reviewed any available results to this point.   If a resident is involved in the Emergency Department care, I have discussed my

## 2019-08-27 NOTE — ED NOTES
Pt back from CT with this RN. Will notify Dr Shavonne Araujo of dropping BP. Fluids infusing at this time. Pt lying flat.      Renita Plata RN  08/27/19 [id number]

## 2019-08-28 ENCOUNTER — APPOINTMENT (OUTPATIENT)
Dept: GENERAL RADIOLOGY | Age: 84
DRG: 871 | End: 2019-08-28
Payer: MEDICARE

## 2019-08-28 ENCOUNTER — APPOINTMENT (OUTPATIENT)
Dept: ULTRASOUND IMAGING | Age: 84
DRG: 871 | End: 2019-08-28
Payer: MEDICARE

## 2019-08-28 PROBLEM — N17.9 ACUTE KIDNEY INJURY (HCC): Status: ACTIVE | Noted: 2019-08-28

## 2019-08-28 LAB
ALBUMIN SERPL-MCNC: 3.3 G/DL (ref 3.5–5.2)
ALP BLD-CCNC: 52 U/L (ref 40–129)
ALT SERPL-CCNC: 25 U/L (ref 0–40)
ANION GAP SERPL CALCULATED.3IONS-SCNC: 17 MMOL/L (ref 7–16)
ANION GAP SERPL CALCULATED.3IONS-SCNC: ABNORMAL MMOL/L (ref 7–16)
ANISOCYTOSIS: ABNORMAL
ANISOCYTOSIS: ABNORMAL
AST SERPL-CCNC: 38 U/L (ref 0–39)
BASOPHILS ABSOLUTE: 0.02 E9/L (ref 0–0.2)
BASOPHILS ABSOLUTE: 0.03 E9/L (ref 0–0.2)
BASOPHILS ABSOLUTE: ABNORMAL E9/L (ref 0–0.2)
BASOPHILS RELATIVE PERCENT: 0.1 % (ref 0–2)
BASOPHILS RELATIVE PERCENT: 0.1 % (ref 0–2)
BASOPHILS RELATIVE PERCENT: ABNORMAL % (ref 0–2)
BILIRUB SERPL-MCNC: 2.8 MG/DL (ref 0–1.2)
BILIRUBIN DIRECT: 1.3 MG/DL (ref 0–0.3)
BILIRUBIN, INDIRECT: 1.5 MG/DL (ref 0–1)
BUN BLDV-MCNC: 77 MG/DL (ref 8–23)
BUN BLDV-MCNC: ABNORMAL MG/DL (ref 8–23)
BURR CELLS: ABNORMAL
BURR CELLS: ABNORMAL
CALCIUM SERPL-MCNC: 7.7 MG/DL (ref 8.6–10.2)
CALCIUM SERPL-MCNC: ABNORMAL MG/DL (ref 8.6–10.2)
CEA: 0.9 NG/ML (ref 0–5.2)
CHLORIDE BLD-SCNC: 103 MMOL/L (ref 98–107)
CHLORIDE BLD-SCNC: ABNORMAL MMOL/L (ref 98–107)
CO2: 17 MMOL/L (ref 22–29)
CO2: ABNORMAL MMOL/L (ref 22–29)
CREAT SERPL-MCNC: 2.5 MG/DL (ref 0.7–1.2)
CREAT SERPL-MCNC: ABNORMAL MG/DL (ref 0.7–1.2)
EOSINOPHILS ABSOLUTE: 0 E9/L (ref 0.05–0.5)
EOSINOPHILS ABSOLUTE: 0 E9/L (ref 0.05–0.5)
EOSINOPHILS ABSOLUTE: ABNORMAL E9/L (ref 0.05–0.5)
EOSINOPHILS RELATIVE PERCENT: 0 % (ref 0–6)
EOSINOPHILS RELATIVE PERCENT: 0 % (ref 0–6)
EOSINOPHILS RELATIVE PERCENT: ABNORMAL % (ref 0–6)
FERRITIN: 469 NG/ML
GFR AFRICAN AMERICAN: 30
GFR AFRICAN AMERICAN: ABNORMAL
GFR NON-AFRICAN AMERICAN: 25 ML/MIN/1.73
GFR NON-AFRICAN AMERICAN: ABNORMAL ML/MIN/1.73
GLUCOSE BLD-MCNC: 218 MG/DL (ref 74–99)
GLUCOSE BLD-MCNC: ABNORMAL MG/DL (ref 74–99)
GRAM STAIN ORDERABLE: NORMAL
HAV IGM SER IA-ACNC: NORMAL
HCT VFR BLD CALC: 29.9 % (ref 37–54)
HCT VFR BLD CALC: 30.6 % (ref 37–54)
HCT VFR BLD CALC: 31.5 % (ref 37–54)
HCT VFR BLD CALC: ABNORMAL % (ref 37–54)
HEMOCCULT STL QL: ABNORMAL
HEMOGLOBIN: 10.3 G/DL (ref 12.5–16.5)
HEMOGLOBIN: 10.3 G/DL (ref 12.5–16.5)
HEMOGLOBIN: 10.5 G/DL (ref 12.5–16.5)
HEMOGLOBIN: ABNORMAL G/DL (ref 12.5–16.5)
HEPATITIS B CORE IGM ANTIBODY: NORMAL
HEPATITIS B SURFACE ANTIGEN INTERPRETATION: NORMAL
HEPATITIS C ANTIBODY INTERPRETATION: NORMAL
IMMATURE GRANULOCYTES #: 0.26 E9/L
IMMATURE GRANULOCYTES #: 0.29 E9/L
IMMATURE GRANULOCYTES #: ABNORMAL E9/L
IMMATURE GRANULOCYTES %: 1.3 % (ref 0–5)
IMMATURE GRANULOCYTES %: 1.4 % (ref 0–5)
IMMATURE GRANULOCYTES %: ABNORMAL % (ref 0–5)
INR BLD: 2.5
IRON SATURATION: 20 % (ref 20–55)
IRON: 23 MCG/DL (ref 59–158)
L. PNEUMOPHILA SEROGP 1 UR AG: NORMAL
LACTIC ACID: 1.5 MMOL/L (ref 0.5–2.2)
LYMPHOCYTES ABSOLUTE: 0.75 E9/L (ref 1.5–4)
LYMPHOCYTES ABSOLUTE: 0.78 E9/L (ref 1.5–4)
LYMPHOCYTES ABSOLUTE: ABNORMAL E9/L (ref 1.5–4)
LYMPHOCYTES RELATIVE PERCENT: 3.7 % (ref 20–42)
LYMPHOCYTES RELATIVE PERCENT: 3.9 % (ref 20–42)
LYMPHOCYTES RELATIVE PERCENT: ABNORMAL % (ref 20–42)
MAGNESIUM: 2.1 MG/DL (ref 1.6–2.6)
MCH RBC QN AUTO: 30.5 PG (ref 26–35)
MCH RBC QN AUTO: 30.6 PG (ref 26–35)
MCH RBC QN AUTO: ABNORMAL PG (ref 26–35)
MCHC RBC AUTO-ENTMCNC: 33.3 % (ref 32–34.5)
MCHC RBC AUTO-ENTMCNC: 33.7 % (ref 32–34.5)
MCHC RBC AUTO-ENTMCNC: ABNORMAL % (ref 32–34.5)
MCV RBC AUTO: 90.5 FL (ref 80–99.9)
MCV RBC AUTO: 91.8 FL (ref 80–99.9)
MCV RBC AUTO: ABNORMAL FL (ref 80–99.9)
METER GLUCOSE: 190 MG/DL (ref 74–99)
METER GLUCOSE: 199 MG/DL (ref 74–99)
METER GLUCOSE: 209 MG/DL (ref 74–99)
METER GLUCOSE: 217 MG/DL (ref 74–99)
METER GLUCOSE: 218 MG/DL (ref 74–99)
MONOCYTES ABSOLUTE: 1.16 E9/L (ref 0.1–0.95)
MONOCYTES ABSOLUTE: 1.16 E9/L (ref 0.1–0.95)
MONOCYTES ABSOLUTE: ABNORMAL E9/L (ref 0.1–0.95)
MONOCYTES RELATIVE PERCENT: 5.7 % (ref 2–12)
MONOCYTES RELATIVE PERCENT: 5.8 % (ref 2–12)
MONOCYTES RELATIVE PERCENT: ABNORMAL % (ref 2–12)
NEUTROPHILS ABSOLUTE: 17.82 E9/L (ref 1.8–7.3)
NEUTROPHILS ABSOLUTE: 18.1 E9/L (ref 1.8–7.3)
NEUTROPHILS ABSOLUTE: ABNORMAL E9/L (ref 1.8–7.3)
NEUTROPHILS RELATIVE PERCENT: 88.8 % (ref 43–80)
NEUTROPHILS RELATIVE PERCENT: 89.2 % (ref 43–80)
NEUTROPHILS RELATIVE PERCENT: ABNORMAL % (ref 43–80)
OVALOCYTES: ABNORMAL
PDW BLD-RTO: 14.8 FL (ref 11.5–15)
PDW BLD-RTO: 14.9 FL (ref 11.5–15)
PDW BLD-RTO: ABNORMAL FL (ref 11.5–15)
PLATELET # BLD: 141 E9/L (ref 130–450)
PLATELET # BLD: 142 E9/L (ref 130–450)
PLATELET # BLD: ABNORMAL E9/L (ref 130–450)
PMV BLD AUTO: 12 FL (ref 7–12)
PMV BLD AUTO: 12.4 FL (ref 7–12)
PMV BLD AUTO: ABNORMAL FL (ref 7–12)
POIKILOCYTES: ABNORMAL
POIKILOCYTES: ABNORMAL
POTASSIUM REFLEX MAGNESIUM: 4.4 MMOL/L (ref 3.5–5)
POTASSIUM REFLEX MAGNESIUM: ABNORMAL MMOL/L (ref 3.5–5)
PROTHROMBIN TIME: 28.3 SEC (ref 9.3–12.4)
RBC # BLD: 3.38 E12/L (ref 3.8–5.8)
RBC # BLD: 3.43 E12/L (ref 3.8–5.8)
RBC # BLD: ABNORMAL E12/L (ref 3.8–5.8)
SODIUM BLD-SCNC: 137 MMOL/L (ref 132–146)
SODIUM BLD-SCNC: ABNORMAL MMOL/L (ref 132–146)
STREP PNEUMONIAE ANTIGEN, URINE: NORMAL
TOTAL IRON BINDING CAPACITY: 115 MCG/DL (ref 250–450)
TOTAL PROTEIN: 5.4 G/DL (ref 6.4–8.3)
TRANSFERRIN: 91 MG/DL (ref 200–360)
TROPONIN: 0.03 NG/ML (ref 0–0.03)
TROPONIN: ABNORMAL NG/ML (ref 0–0.03)
WBC # BLD: 20.1 E9/L (ref 4.5–11.5)
WBC # BLD: 20.3 E9/L (ref 4.5–11.5)
WBC # BLD: ABNORMAL E9/L (ref 4.5–11.5)

## 2019-08-28 PROCEDURE — 82728 ASSAY OF FERRITIN: CPT

## 2019-08-28 PROCEDURE — 86301 IMMUNOASSAY TUMOR CA 19-9: CPT

## 2019-08-28 PROCEDURE — 80076 HEPATIC FUNCTION PANEL: CPT

## 2019-08-28 PROCEDURE — 85014 HEMATOCRIT: CPT

## 2019-08-28 PROCEDURE — 36415 COLL VENOUS BLD VENIPUNCTURE: CPT

## 2019-08-28 PROCEDURE — 82390 ASSAY OF CERULOPLASMIN: CPT

## 2019-08-28 PROCEDURE — 2580000003 HC RX 258: Performed by: EMERGENCY MEDICINE

## 2019-08-28 PROCEDURE — 36620 INSERTION CATHETER ARTERY: CPT

## 2019-08-28 PROCEDURE — 2700000000 HC OXYGEN THERAPY PER DAY

## 2019-08-28 PROCEDURE — C9113 INJ PANTOPRAZOLE SODIUM, VIA: HCPCS | Performed by: INTERNAL MEDICINE

## 2019-08-28 PROCEDURE — 83605 ASSAY OF LACTIC ACID: CPT

## 2019-08-28 PROCEDURE — 36620 INSERTION CATHETER ARTERY: CPT | Performed by: INTERNAL MEDICINE

## 2019-08-28 PROCEDURE — 85610 PROTHROMBIN TIME: CPT

## 2019-08-28 PROCEDURE — 83550 IRON BINDING TEST: CPT

## 2019-08-28 PROCEDURE — 99233 SBSQ HOSP IP/OBS HIGH 50: CPT | Performed by: INTERNAL MEDICINE

## 2019-08-28 PROCEDURE — P9047 ALBUMIN (HUMAN), 25%, 50ML: HCPCS | Performed by: INTERNAL MEDICINE

## 2019-08-28 PROCEDURE — 83735 ASSAY OF MAGNESIUM: CPT

## 2019-08-28 PROCEDURE — 2580000003 HC RX 258: Performed by: INTERNAL MEDICINE

## 2019-08-28 PROCEDURE — 85025 COMPLETE CBC W/AUTO DIFF WBC: CPT

## 2019-08-28 PROCEDURE — 85018 HEMOGLOBIN: CPT

## 2019-08-28 PROCEDURE — 83540 ASSAY OF IRON: CPT

## 2019-08-28 PROCEDURE — 6360000002 HC RX W HCPCS: Performed by: INTERNAL MEDICINE

## 2019-08-28 PROCEDURE — 2000000000 HC ICU R&B

## 2019-08-28 PROCEDURE — 82962 GLUCOSE BLOOD TEST: CPT

## 2019-08-28 PROCEDURE — 74022 RADEX COMPL AQT ABD SERIES: CPT

## 2019-08-28 PROCEDURE — 99232 SBSQ HOSP IP/OBS MODERATE 35: CPT | Performed by: FAMILY MEDICINE

## 2019-08-28 PROCEDURE — 82378 CARCINOEMBRYONIC ANTIGEN: CPT

## 2019-08-28 PROCEDURE — 80048 BASIC METABOLIC PNL TOTAL CA: CPT

## 2019-08-28 PROCEDURE — 84484 ASSAY OF TROPONIN QUANT: CPT

## 2019-08-28 PROCEDURE — 2500000003 HC RX 250 WO HCPCS: Performed by: INTERNAL MEDICINE

## 2019-08-28 PROCEDURE — 2500000003 HC RX 250 WO HCPCS: Performed by: EMERGENCY MEDICINE

## 2019-08-28 PROCEDURE — 82525 ASSAY OF COPPER: CPT

## 2019-08-28 PROCEDURE — 93970 EXTREMITY STUDY: CPT

## 2019-08-28 PROCEDURE — 6370000000 HC RX 637 (ALT 250 FOR IP): Performed by: INTERNAL MEDICINE

## 2019-08-28 PROCEDURE — 84466 ASSAY OF TRANSFERRIN: CPT

## 2019-08-28 RX ORDER — PANTOPRAZOLE SODIUM 40 MG/10ML
40 INJECTION, POWDER, LYOPHILIZED, FOR SOLUTION INTRAVENOUS DAILY
Status: DISCONTINUED | OUTPATIENT
Start: 2019-08-28 | End: 2019-08-29

## 2019-08-28 RX ORDER — 0.9 % SODIUM CHLORIDE 0.9 %
1000 INTRAVENOUS SOLUTION INTRAVENOUS ONCE
Status: DISCONTINUED | OUTPATIENT
Start: 2019-08-28 | End: 2019-08-28

## 2019-08-28 RX ORDER — ALBUMIN (HUMAN) 12.5 G/50ML
25 SOLUTION INTRAVENOUS EVERY 6 HOURS
Status: DISCONTINUED | OUTPATIENT
Start: 2019-08-28 | End: 2019-08-30

## 2019-08-28 RX ORDER — 0.9 % SODIUM CHLORIDE 0.9 %
500 INTRAVENOUS SOLUTION INTRAVENOUS ONCE
Status: COMPLETED | OUTPATIENT
Start: 2019-08-28 | End: 2019-08-28

## 2019-08-28 RX ORDER — SODIUM CHLORIDE 9 MG/ML
INJECTION, SOLUTION INTRAVENOUS CONTINUOUS
Status: DISCONTINUED | OUTPATIENT
Start: 2019-08-28 | End: 2019-08-28

## 2019-08-28 RX ORDER — LIDOCAINE HYDROCHLORIDE 10 MG/ML
INJECTION, SOLUTION EPIDURAL; INFILTRATION; INTRACAUDAL; PERINEURAL
Status: DISCONTINUED
Start: 2019-08-28 | End: 2019-08-29

## 2019-08-28 RX ORDER — 0.9 % SODIUM CHLORIDE 0.9 %
10 VIAL (ML) INJECTION DAILY
Status: DISCONTINUED | OUTPATIENT
Start: 2019-08-28 | End: 2019-08-29

## 2019-08-28 RX ORDER — MIDODRINE HYDROCHLORIDE 5 MG/1
10 TABLET ORAL
Status: DISCONTINUED | OUTPATIENT
Start: 2019-08-29 | End: 2019-09-03

## 2019-08-28 RX ADMIN — THIAMINE HYDROCHLORIDE 200 MG: 100 INJECTION, SOLUTION INTRAMUSCULAR; INTRAVENOUS at 08:00

## 2019-08-28 RX ADMIN — ALBUMIN (HUMAN) 25 G: 0.25 INJECTION, SOLUTION INTRAVENOUS at 12:16

## 2019-08-28 RX ADMIN — ALBUMIN (HUMAN) 25 G: 0.25 INJECTION, SOLUTION INTRAVENOUS at 23:52

## 2019-08-28 RX ADMIN — FLUDROCORTISONE ACETATE 0.1 MG: 0.1 TABLET ORAL at 07:52

## 2019-08-28 RX ADMIN — METRONIDAZOLE 500 MG: 500 INJECTION, SOLUTION INTRAVENOUS at 06:16

## 2019-08-28 RX ADMIN — Medication 10 ML: at 07:57

## 2019-08-28 RX ADMIN — Medication 10 ML: at 20:13

## 2019-08-28 RX ADMIN — ALBUMIN (HUMAN) 25 G: 0.25 INJECTION, SOLUTION INTRAVENOUS at 17:43

## 2019-08-28 RX ADMIN — INSULIN LISPRO 2 UNITS: 100 INJECTION, SOLUTION INTRAVENOUS; SUBCUTANEOUS at 07:47

## 2019-08-28 RX ADMIN — SODIUM CHLORIDE 500 ML: 9 INJECTION, SOLUTION INTRAVENOUS at 17:43

## 2019-08-28 RX ADMIN — INSULIN LISPRO 2 UNITS: 100 INJECTION, SOLUTION INTRAVENOUS; SUBCUTANEOUS at 12:13

## 2019-08-28 RX ADMIN — ASCORBIC ACID 1500 MG: 500 INJECTION, SOLUTION INTRAMUSCULAR; INTRAVENOUS; SUBCUTANEOUS at 07:53

## 2019-08-28 RX ADMIN — PANTOPRAZOLE SODIUM 40 MG: 40 INJECTION, POWDER, FOR SOLUTION INTRAVENOUS at 07:56

## 2019-08-28 RX ADMIN — HYDROCORTISONE SODIUM SUCCINATE 50 MG: 100 INJECTION, POWDER, FOR SOLUTION INTRAMUSCULAR; INTRAVENOUS at 07:52

## 2019-08-28 RX ADMIN — METRONIDAZOLE 500 MG: 500 INJECTION, SOLUTION INTRAVENOUS at 21:48

## 2019-08-28 RX ADMIN — VASOPRESSIN 0.04 UNITS/MIN: 20 INJECTION INTRAVENOUS at 04:00

## 2019-08-28 RX ADMIN — SODIUM CHLORIDE 500 ML: 9 INJECTION, SOLUTION INTRAVENOUS at 12:16

## 2019-08-28 RX ADMIN — HYDROCORTISONE SODIUM SUCCINATE 50 MG: 100 INJECTION, POWDER, FOR SOLUTION INTRAMUSCULAR; INTRAVENOUS at 14:00

## 2019-08-28 RX ADMIN — METRONIDAZOLE 500 MG: 500 INJECTION, SOLUTION INTRAVENOUS at 14:01

## 2019-08-28 RX ADMIN — ASCORBIC ACID 1500 MG: 500 INJECTION, SOLUTION INTRAMUSCULAR; INTRAVENOUS; SUBCUTANEOUS at 14:00

## 2019-08-28 RX ADMIN — Medication 10 ML: at 07:53

## 2019-08-28 RX ADMIN — INSULIN LISPRO 1 UNITS: 100 INJECTION, SOLUTION INTRAVENOUS; SUBCUTANEOUS at 16:18

## 2019-08-28 RX ADMIN — CEFTRIAXONE SODIUM 1 G: 1 INJECTION, POWDER, FOR SOLUTION INTRAMUSCULAR; INTRAVENOUS at 12:20

## 2019-08-28 RX ADMIN — HYDROCORTISONE SODIUM SUCCINATE 50 MG: 100 INJECTION, POWDER, FOR SOLUTION INTRAMUSCULAR; INTRAVENOUS at 01:45

## 2019-08-28 RX ADMIN — ASCORBIC ACID 1500 MG: 500 INJECTION, SOLUTION INTRAMUSCULAR; INTRAVENOUS; SUBCUTANEOUS at 01:45

## 2019-08-28 RX ADMIN — HYDROCORTISONE SODIUM SUCCINATE 50 MG: 100 INJECTION, POWDER, FOR SOLUTION INTRAMUSCULAR; INTRAVENOUS at 20:12

## 2019-08-28 RX ADMIN — INSULIN LISPRO 2 UNITS: 100 INJECTION, SOLUTION INTRAVENOUS; SUBCUTANEOUS at 20:10

## 2019-08-28 RX ADMIN — THIAMINE HYDROCHLORIDE 200 MG: 100 INJECTION, SOLUTION INTRAMUSCULAR; INTRAVENOUS at 21:10

## 2019-08-28 RX ADMIN — ASCORBIC ACID 1500 MG: 500 INJECTION, SOLUTION INTRAMUSCULAR; INTRAVENOUS; SUBCUTANEOUS at 20:05

## 2019-08-28 ASSESSMENT — PAIN SCALES - GENERAL
PAINLEVEL_OUTOF10: 0

## 2019-08-28 NOTE — PROGRESS NOTES
Art line   None    TLC R IJ    PICC None    Hemoaccess None    Oxygen:     nasal canula     ABG:     Lab Results   Component Value Date    PH 7.371 08/27/2019    PCO2 33.0 08/27/2019    PO2 92.1 08/27/2019    HCO3 18.7 08/27/2019    BE -5.7 08/27/2019    THB 12.0 08/27/2019    O2SAT 96.0 08/27/2019        Medications     Infusions: (Fluid, Sedation, Vasopressors)  IVF:    Normal saline bolus and albumin  Vasopressors   Levophed and Vasopressin with Vitamin C protocol  Sedation   None     Nutrition:   Cardiac diet   ATB:   Antibiotics  Days   Ceftriaxone              Skin issues: skin changes; thickened along sacrum and buttock  Patient currently has   Urinary cath  Isolation  Restraints  DVT prophylaxis/ GI prophylaxis,    PT/OT  SNF/NH placement  Palliative care consult   Labs     CBC with Differential:    Lab Results   Component Value Date    WBC 20.3 08/28/2019    RBC 3.38 08/28/2019    HGB 10.3 08/28/2019    HCT 30.6 08/28/2019     08/28/2019    MCV 90.5 08/28/2019    MCH 30.5 08/28/2019    MCHC 33.7 08/28/2019    RDW 14.8 08/28/2019    LYMPHOPCT see below 08/28/2019    MONOPCT see below 08/28/2019    BASOPCT see below 08/28/2019    MONOSABS see below 08/28/2019    LYMPHSABS see below 08/28/2019    EOSABS see below 08/28/2019    BASOSABS see below 08/28/2019     CMP:    Lab Results   Component Value Date     08/28/2019    K 4.4 08/28/2019     08/28/2019    CO2 17 08/28/2019    BUN 77 08/28/2019    CREATININE 2.5 08/28/2019    GFRAA 30 08/28/2019    LABGLOM 25 08/28/2019    GLUCOSE 218 08/28/2019    GLUCOSE 110 05/30/2012    PROT 6.5 08/27/2019    LABALBU 3.6 08/27/2019    LABALBU 4.2 05/30/2012    CALCIUM 7.7 08/28/2019    BILITOT 3.7 08/27/2019    ALKPHOS 108 08/27/2019    AST 32 08/27/2019    ALT 19 08/27/2019     Calcium:    Lab Results   Component Value Date    CALCIUM 7.7 08/28/2019     Magnesium:    Lab Results   Component Value Date    MG 2.1 08/28/2019     Phosphorus:  No results

## 2019-08-28 NOTE — CONSULTS
DO   10 mL at 08/28/19 0753    insulin lispro (HUMALOG) injection vial 0-6 Units  0-6 Units Subcutaneous Q4H Ayana Dawkins., DO   1 Units at 08/28/19 1618    albumin human 25 % solution 25 g  25 g Intravenous Q6H Isi Small., DO   25 g at 08/28/19 1216    norepinephrine (LEVOPHED) 16 mg in dextrose 5% 250 mL infusion  2 mcg/min Intravenous Continuous Brandy Halt, DO 7.5 mL/hr at 08/28/19 1649 8 mcg/min at 08/28/19 1649    vasopressin 20 Units in dextrose 5 % 100 mL infusion  0.04 Units/min Intravenous Continuous Brandy Halt, DO   Stopped at 08/28/19 1202    sodium chloride flush 0.9 % injection 10 mL  10 mL Intravenous 2 times per day Riaz Thomas MD   10 mL at 08/28/19 0757    sodium chloride flush 0.9 % injection 10 mL  10 mL Intravenous PRN Riaz Thomas MD        magnesium hydroxide (MILK OF MAGNESIA) 400 MG/5ML suspension 30 mL  30 mL Oral Daily PRN Riaz Thomas MD        ondansetron (ZOFRAN) injection 4 mg  4 mg Intravenous Q6H PRN [place] MD        0.9 % sodium chloride bolus  250 mL Intravenous Once Riaz Thomas MD        cefTRIAXone (ROCEPHIN) 1 g in sterile water 10 mL IV syringe  1 g Intravenous Q24H [place] MD   1 g at 08/28/19 1220    metronidazole (FLAGYL) 500 mg in NaCl 100 mL IVPB premix  500 mg Intravenous Q8H Riaz Thomas MD   Stopped at 08/28/19 1504    ascorbic acid 1,500 mg in sodium chloride 0.9 % 100 mL IVPB  1,500 mg Intravenous Q6H Isi Small., DO   1,500 mg at 08/28/19 1400    fludrocortisone (FLORINEF) tablet 0.1 mg  0.1 mg Oral Daily Isi Lopez Jr., DO   0.1 mg at 08/28/19 2531    hydrocortisone sodium succinate PF (SOLU-CORTEF) injection 50 mg  50 mg Intravenous Q6H Ayana Dawkins., DO   50 mg at 08/28/19 1400    thiamine (B-1) 200 mg in sodium chloride 0.9 % 100 mL IVPB  200 mg Intravenous BID Ayana Dawkins., DO   Stopped at 08/28/19 1016    glucose (GLUTOSE) 40 % oral gel 15 g  15 g Oral PRN Farida Kent MD        dextrose 50 % IV solution  12.5 g Intravenous PRN Farida Kent MD        glucagon (rDNA) injection 1 mg  1 mg Intramuscular PRN Farida Kent MD        dextrose 5 % solution  100 mL/hr Intravenous PRLIDIA Kent MD           No Known Allergies    Surgical History  Past Surgical History:   Procedure Laterality Date    BACK SURGERY      CARDIAC DEFIBRILLATOR PLACEMENT      CORONARY ANGIOPLASTY WITH STENT PLACEMENT      PACEMAKER INSERTION      TONSILLECTOMY          Social History  Social History     Socioeconomic History    Marital status:    Tobacco Use    Smoking status: Former Smoker     Years: 40.00     Last attempt to quit: 1999     Years since quittin.6    Smokeless tobacco: Never Used   Substance and Sexual Activity    Alcohol use: Yes     Comment: socially    Drug use: No       Family Medical History  Family History   Problem Relation Age of Onset    Stroke Mother        Review of Systems:  Constitutional: No fever, no chills  Eyes: Has blurred vision, no retroorbital pain  ENT: No hearing changes, no ear pain  Respiratory: No cough, no dyspnea  Cardiovascular: No chest pain, no palpitations  Gastrointestinal: No abdominal pain, no diarrhea  Genitourinary: No dysuria, no hematuria  Integumentary: No rash, no itching  Musculoskeletal: No muscle pain, no joint pain  Neurologic: No headache, no numbness in extremities    Physical Examination:  Vitals:    19 1500 19 1530 19 1600 19 1630   BP: (!) 96/48 (!) 92/53 (!) 90/57 (!) 92/51   Pulse: 62 66 63 58   Resp: 30 27 24 20   Temp:   98.7 °F (37.1 °C)    TempSrc:       SpO2:       Weight:       Height:         Constitutional: Alert, not in distress  Eyes: Sclerae anicteric, no conjunctival erythema  ENT: No buccal lesion, no pharyngeal exudates  Neck: No nuchal rigidity, no cervical adenopathy  Lungs: Clear breath sounds, no

## 2019-08-28 NOTE — CONSULTS
Department of Internal Medicine  Nephrology Attending Consult Note      Reason for Consult: Elevated creatinine level  Requesting Physician:  Dr. Leung Flick: Diarrhea and fever    History Obtained From:  patient, electronic medical record    HISTORY OF PRESENT ILLNESS: Briefly Mr. Faiza Guerra is a 82-year-old man with history of CKD stage III, baseline creatinine 1.3 mg/dL, without proteinuria, probably due to nephrosclerosis, HTN, type II DM, CAD status post stenting, ICD placement, AF on anticoagulation with warfarin, HFrEF 30-35%, hyperlipidemia, hypothyroidism, alcohol abuse, who was admitted on August 27, 2019 after he presented to the ER with complaints of abdominal pain and diarrhea. On admission his creatinine was 2 mg/dL, despite IV fluids administration his creatinine has continued to increase up to 2.5 g/dL reason for this consultation. His medications prior to admission included furosemide, Entresto. A CT scan of the abdomen obtained on admission demonstrated presence of ascites and findings consistent with cirrhosis as well as multiple hepatic lesions probably metastatic disease. He underwent a large volume paracentesis of 5.5 L.     Past Medical History:        Diagnosis Date    Arrhythmia     Atrial fibrillation ([address]     CAD (coronary artery disease)     Chronic kidney disease     Congestive heart disease ([address]     Diabetes mellitus ([address]     Hyperlipidemia     Hypertension     Hypothyroidism     [facility]     Primary osteoarthritis of both knees     Type 2 diabetes mellitus with complication (HCC)      Past Surgical History:        Procedure Laterality Date    BACK SURGERY      CARDIAC DEFIBRILLATOR PLACEMENT      CORONARY ANGIOPLASTY WITH STENT PLACEMENT      PACEMAKER INSERTION      TONSILLECTOMY       Current Medications:    Current Facility-Administered Medications: pantoprazole (PROTONIX) injection 40 mg, 40 mg, Intravenous, Daily **AND** sodium Value Date    MG 2.1 08/28/2019     Phosphorus:  No results found for: PHOS     Urine sodium: <20  Urine chloride: <20  Urine potassium: 75.6  Urine creatinine: 209   Urine UN: 723  FE Urea: 13.8%      Radiology Review:     Acute abdominal series and chest x-ray August 28, 2019   1. No radiographic evidence of large volume free intraperitoneal air. Please note that smaller volumes of free intraperitoneal air may or   may not be visible on abdominal radiographs. If there is persistent   clinical concern for free intraperitoneal air, CT scan the abdomen and   pelvis is recommended. 2. Stable, enlarged cardiac mediastinal silhouette with underlying   mild central pulmonary vascular congestion and thoracic aortic   vascular calcifications. 3. Nonspecific bibasilar airspace disease, findings can be seen in   infiltrate/pneumonia and/or atelectasis. Anupam Dials 4. 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 5. Gaseous distended of the stomach in the midline. CT abdomen and pelvis without contrast August 27, 2019   There are bilateral infiltrates at the lung bases, likely related to   atelectasis     There is findings to suggest cirrhosis   Cholelithiasis   Ascites   Multiple lesions in the liver         Chest x-ray August 27, 2019   Mild CHF without edema.       Right IJ central line in the superior vena cava without complications.                 IMPRESSION/RECOMMENDATIONS:      Briefly Mr. Elda Munoz is a 61-year-old man with history of CKD stage III, baseline creatinine 1.3 mg/dL, without proteinuria, probably due to nephrosclerosis, HTN, type II DM, CAD status post stenting, ICD placement, AF on anticoagulation with warfarin, HFrEF 30-35%, hyperlipidemia, hypothyroidism, alcohol abuse, who was admitted on August 27, 2019 after he presented to the ER with complaints of abdominal pain and diarrhea.   On admission his creatinine was 2 mg/dL, despite IV fluids administration his creatinine has continued to increase

## 2019-08-28 NOTE — PROGRESS NOTES
Head: Normocephalic and atraumatic. Eyes: Pupils are equal, round, and reactive to light. Conjunctivae and EOM are normal. Right eye exhibits no discharge. Left eye exhibits no discharge. No scleral icterus. Neck: Normal range of motion. No JVD present. No tracheal deviation present. No thyromegaly present. Cardiovascular: Normal rate, regular rhythm and normal heart sounds. Exam reveals no gallop and no friction rub. No murmur heard. distal pulses weak, symmetrical   Pulmonary/Chest: Effort normal and breath sounds normal. No respiratory distress. He has no wheezes. He has no rales. He exhibits no tenderness. Abdominal: He exhibits distension. He exhibits no mass. There is no tenderness. There is no rebound and no guarding. Musculoskeletal: He exhibits edema and tenderness. He exhibits no deformity. 3+ edema right leg mid-shin, +2 edema left leg mid-shin   Lymphadenopathy:     He has no cervical adenopathy. Neurological: He is alert and oriented to person, place, and time. He exhibits normal muscle tone. Skin: Skin is warm and dry. He is not diaphoretic. No erythema. pale-yellow skin on abdomen   Psychiatric: He has a normal mood and affect. His behavior is normal.          Labs:  Na/K/Cl/CO2:  137/4.4/103/17 (08/28 0615)  BUN/Cr/glu/ALT/AST/amyl/lip:  77/2.5/--/--/--/--/-- (08/28 0615)  WBC/Hgb/Hct/Plts:  20.1/10.5/31.5/141 (08/28 0615)  estimated creatinine clearance is 22 mL/min (A) (based on SCr of 2.5 mg/dL (H)). Other pertinent labs as noted below    New Imaging:  XR CHEST PORTABLE   Final Result   Mild CHF without edema. Right IJ central line in the superior vena cava without complications.             CT ABDOMEN PELVIS WO CONTRAST   Final Result   There are bilateral infiltrates at the lung bases, likely related to   atelectasis     There is findings to suggest cirrhosis   Cholelithiasis   Ascites   Multiple lesions in the liver                           XR CHEST PORTABLE   Final Result   Cardiomegaly   Findings compatible with atherosclerotic disease of the aorta. No acute infiltrate    There is a poor inspiratory effort                       XR CHEST PORTABLE    (Results Pending)   XR ABDOMEN (2 VIEWS)    (Results Pending)       A/P:  Active Problems:    Septic shock ([address]  Resolved Problems:    * No resolved hospital problems. *    Cardiology  Shock, sepsis hypovolemia 2/2 GI loss and 3rd spacing  Continues on Levophed and Vasopressin- MAP Goal >65  Ceftiaxone and Metronidazole- SBP coverage  Appreciate and will follow with Dr. Stephie Garcia blood culture, urine culture results     History of HFrEF, Hx Afib, Hx of MI  Echo: (1/2019): 56% severely dilated atrium, severe TR, pulm hypertension, LVDD noted, Afib  ICD in place on Coumadin, Digoxin     Elevated Troponin  Could be secondary to HELENA  Is trending downward continue to monitor    Gastrointestinal  Ascites- 2/2 most likely to SBP  5 L Fluid taken out  Follow-up Ascitic Fluid Gram Stain, Fluid Culture  On Rocephin and Flagyl     Neurological  AMS-resolved  Confused this morning per AMS  Likely 2/2 to septic shock vs hypoperfusion    Hematology/Oncology  Low Hemoglobin  Consider measure direct bilirubin- hemolytic cause  Also Iron Panel, FOBT    Thrombocytopenia- resolving  Continue to monitor     Genitourinary  UTI  Awaiting Urine Culture- will follow    Elevated Creatinine  Rising- it is 2.5 today  Continue with IV Fluids and monitor    HELENA on CKD  Calculate FeNa  Continue with IV Sodium Chloride    Elevated Anion Gap  2/2 Lactic Acidosis     Lactic Acidosis- resolved  Continue to monitor, resolving     DVT / GI prophylaxis:  SCD    Diet: NPO        Hospital course:   8/28/2019      Electronically signed by Richmond Sood MD PGY-1 on 8/28/2019 at 7:56 AM  This case was discussed with attending physician: Dr. Alvarado        This note was dictated with Dragon Software.

## 2019-08-29 ENCOUNTER — APPOINTMENT (OUTPATIENT)
Dept: GENERAL RADIOLOGY | Age: 84
DRG: 871 | End: 2019-08-29
Payer: MEDICARE

## 2019-08-29 LAB
ALBUMIN SERPL-MCNC: 3.7 G/DL (ref 3.5–5.2)
ALP BLD-CCNC: 50 U/L (ref 40–129)
ALT SERPL-CCNC: 27 U/L (ref 0–40)
ANION GAP SERPL CALCULATED.3IONS-SCNC: 15 MMOL/L (ref 7–16)
ANION GAP SERPL CALCULATED.3IONS-SCNC: 16 MMOL/L (ref 7–16)
ANION GAP SERPL CALCULATED.3IONS-SCNC: 17 MMOL/L (ref 7–16)
ANISOCYTOSIS: ABNORMAL
AST SERPL-CCNC: 28 U/L (ref 0–39)
BASOPHILS ABSOLUTE: 0 E9/L (ref 0–0.2)
BASOPHILS RELATIVE PERCENT: 0.2 % (ref 0–2)
BILIRUB SERPL-MCNC: 2 MG/DL (ref 0–1.2)
BILIRUBIN DIRECT: 0.9 MG/DL (ref 0–0.3)
BILIRUBIN, INDIRECT: 1.1 MG/DL (ref 0–1)
BUN BLDV-MCNC: 79 MG/DL (ref 8–23)
BUN BLDV-MCNC: 80 MG/DL (ref 8–23)
BUN BLDV-MCNC: 83 MG/DL (ref 8–23)
BURR CELLS: ABNORMAL
CALCIUM SERPL-MCNC: 7.5 MG/DL (ref 8.6–10.2)
CALCIUM SERPL-MCNC: 7.5 MG/DL (ref 8.6–10.2)
CALCIUM SERPL-MCNC: 8.1 MG/DL (ref 8.6–10.2)
CHLORIDE BLD-SCNC: 104 MMOL/L (ref 98–107)
CHLORIDE URINE RANDOM: <20 MMOL/L
CO2: 18 MMOL/L (ref 22–29)
CO2: 19 MMOL/L (ref 22–29)
CO2: 21 MMOL/L (ref 22–29)
CREAT SERPL-MCNC: 2 MG/DL (ref 0.7–1.2)
CREAT SERPL-MCNC: 2.1 MG/DL (ref 0.7–1.2)
CREAT SERPL-MCNC: 2.2 MG/DL (ref 0.7–1.2)
CREATININE URINE: 88 MG/DL (ref 40–278)
EOSINOPHILS ABSOLUTE: 0 E9/L (ref 0.05–0.5)
EOSINOPHILS RELATIVE PERCENT: 0 % (ref 0–6)
GFR AFRICAN AMERICAN: 35
GFR AFRICAN AMERICAN: 37
GFR AFRICAN AMERICAN: 39
GFR NON-AFRICAN AMERICAN: 29 ML/MIN/1.73
GFR NON-AFRICAN AMERICAN: 30 ML/MIN/1.73
GFR NON-AFRICAN AMERICAN: 32 ML/MIN/1.73
GLUCOSE BLD-MCNC: 192 MG/DL (ref 74–99)
GLUCOSE BLD-MCNC: 198 MG/DL (ref 74–99)
GLUCOSE BLD-MCNC: 211 MG/DL (ref 74–99)
HCT VFR BLD CALC: 29.1 % (ref 37–54)
HCT VFR BLD CALC: 29.6 % (ref 37–54)
HCT VFR BLD CALC: 29.8 % (ref 37–54)
HCT VFR BLD CALC: 29.9 % (ref 37–54)
HEMOGLOBIN: 10 G/DL (ref 12.5–16.5)
HEMOGLOBIN: 10.1 G/DL (ref 12.5–16.5)
HEMOGLOBIN: 10.3 G/DL (ref 12.5–16.5)
HEMOGLOBIN: 9.9 G/DL (ref 12.5–16.5)
INR BLD: 1.9
LYMPHOCYTES ABSOLUTE: 1.39 E9/L (ref 1.5–4)
LYMPHOCYTES RELATIVE PERCENT: 6.1 % (ref 20–42)
MCH RBC QN AUTO: 31.3 PG (ref 26–35)
MCHC RBC AUTO-ENTMCNC: 34.8 % (ref 32–34.5)
MCV RBC AUTO: 90 FL (ref 80–99.9)
METER GLUCOSE: 195 MG/DL (ref 74–99)
METER GLUCOSE: 204 MG/DL (ref 74–99)
METER GLUCOSE: 206 MG/DL (ref 74–99)
METER GLUCOSE: 212 MG/DL (ref 74–99)
METER GLUCOSE: 242 MG/DL (ref 74–99)
METER GLUCOSE: 252 MG/DL (ref 74–99)
MONOCYTES ABSOLUTE: 0.69 E9/L (ref 0.1–0.95)
MONOCYTES RELATIVE PERCENT: 2.6 % (ref 2–12)
NEUTROPHILS ABSOLUTE: 21.02 E9/L (ref 1.8–7.3)
NEUTROPHILS RELATIVE PERCENT: 91.3 % (ref 43–80)
OVALOCYTES: ABNORMAL
PDW BLD-RTO: 14.7 FL (ref 11.5–15)
PHOSPHORUS: 3.9 MG/DL (ref 2.5–4.5)
PLATELET # BLD: 156 E9/L (ref 130–450)
PMV BLD AUTO: 12 FL (ref 7–12)
POIKILOCYTES: ABNORMAL
POTASSIUM REFLEX MAGNESIUM: 3.6 MMOL/L (ref 3.5–5)
POTASSIUM SERPL-SCNC: 3.5 MMOL/L (ref 3.5–5)
POTASSIUM SERPL-SCNC: 3.6 MMOL/L (ref 3.5–5)
POTASSIUM SERPL-SCNC: 3.7 MMOL/L (ref 3.5–5)
POTASSIUM, UR: 51 MMOL/L
PROCALCITONIN: 36.77 NG/ML (ref 0–0.08)
PROTHROMBIN TIME: 21.9 SEC (ref 9.3–12.4)
RBC # BLD: 3.29 E12/L (ref 3.8–5.8)
SCHISTOCYTES: ABNORMAL
SODIUM BLD-SCNC: 138 MMOL/L (ref 132–146)
SODIUM BLD-SCNC: 140 MMOL/L (ref 132–146)
SODIUM BLD-SCNC: 140 MMOL/L (ref 132–146)
SODIUM URINE: <20 MMOL/L
TOTAL PROTEIN: 5.4 G/DL (ref 6.4–8.3)
URINE CULTURE, ROUTINE: NORMAL
WBC # BLD: 23.1 E9/L (ref 4.5–11.5)

## 2019-08-29 PROCEDURE — 84100 ASSAY OF PHOSPHORUS: CPT

## 2019-08-29 PROCEDURE — 82436 ASSAY OF URINE CHLORIDE: CPT

## 2019-08-29 PROCEDURE — 85014 HEMATOCRIT: CPT

## 2019-08-29 PROCEDURE — 2580000003 HC RX 258: Performed by: INTERNAL MEDICINE

## 2019-08-29 PROCEDURE — 6360000002 HC RX W HCPCS: Performed by: INTERNAL MEDICINE

## 2019-08-29 PROCEDURE — 85025 COMPLETE CBC W/AUTO DIFF WBC: CPT

## 2019-08-29 PROCEDURE — 6370000000 HC RX 637 (ALT 250 FOR IP): Performed by: INTERNAL MEDICINE

## 2019-08-29 PROCEDURE — 82248 BILIRUBIN DIRECT: CPT

## 2019-08-29 PROCEDURE — 71045 X-RAY EXAM CHEST 1 VIEW: CPT

## 2019-08-29 PROCEDURE — 2500000003 HC RX 250 WO HCPCS: Performed by: INTERNAL MEDICINE

## 2019-08-29 PROCEDURE — 80048 BASIC METABOLIC PNL TOTAL CA: CPT

## 2019-08-29 PROCEDURE — P9047 ALBUMIN (HUMAN), 25%, 50ML: HCPCS | Performed by: INTERNAL MEDICINE

## 2019-08-29 PROCEDURE — 97530 THERAPEUTIC ACTIVITIES: CPT

## 2019-08-29 PROCEDURE — 2500000003 HC RX 250 WO HCPCS

## 2019-08-29 PROCEDURE — 97162 PT EVAL MOD COMPLEX 30 MIN: CPT

## 2019-08-29 PROCEDURE — 85610 PROTHROMBIN TIME: CPT

## 2019-08-29 PROCEDURE — 99233 SBSQ HOSP IP/OBS HIGH 50: CPT | Performed by: INTERNAL MEDICINE

## 2019-08-29 PROCEDURE — 36415 COLL VENOUS BLD VENIPUNCTURE: CPT

## 2019-08-29 PROCEDURE — 97166 OT EVAL MOD COMPLEX 45 MIN: CPT

## 2019-08-29 PROCEDURE — C9113 INJ PANTOPRAZOLE SODIUM, VIA: HCPCS | Performed by: INTERNAL MEDICINE

## 2019-08-29 PROCEDURE — 2500000003 HC RX 250 WO HCPCS: Performed by: EMERGENCY MEDICINE

## 2019-08-29 PROCEDURE — 99232 SBSQ HOSP IP/OBS MODERATE 35: CPT | Performed by: FAMILY MEDICINE

## 2019-08-29 PROCEDURE — 97535 SELF CARE MNGMENT TRAINING: CPT

## 2019-08-29 PROCEDURE — 84145 PROCALCITONIN (PCT): CPT

## 2019-08-29 PROCEDURE — 84300 ASSAY OF URINE SODIUM: CPT

## 2019-08-29 PROCEDURE — 82962 GLUCOSE BLOOD TEST: CPT

## 2019-08-29 PROCEDURE — 85018 HEMOGLOBIN: CPT

## 2019-08-29 PROCEDURE — 2060000000 HC ICU INTERMEDIATE R&B

## 2019-08-29 PROCEDURE — 84133 ASSAY OF URINE POTASSIUM: CPT

## 2019-08-29 PROCEDURE — 82570 ASSAY OF URINE CREATININE: CPT

## 2019-08-29 PROCEDURE — 80053 COMPREHEN METABOLIC PANEL: CPT

## 2019-08-29 RX ORDER — LIDOCAINE HYDROCHLORIDE 10 MG/ML
INJECTION, SOLUTION EPIDURAL; INFILTRATION; INTRACAUDAL; PERINEURAL
Status: COMPLETED
Start: 2019-08-29 | End: 2019-08-29

## 2019-08-29 RX ORDER — PETROLATUM 42 G/100G
OINTMENT TOPICAL 2 TIMES DAILY PRN
Status: DISCONTINUED | OUTPATIENT
Start: 2019-08-29 | End: 2019-09-04 | Stop reason: HOSPADM

## 2019-08-29 RX ORDER — ATORVASTATIN CALCIUM 10 MG/1
20 TABLET, FILM COATED ORAL DAILY
Status: DISCONTINUED | OUTPATIENT
Start: 2019-08-29 | End: 2019-09-04 | Stop reason: HOSPADM

## 2019-08-29 RX ADMIN — Medication 10 ML: at 01:23

## 2019-08-29 RX ADMIN — METRONIDAZOLE 500 MG: 500 INJECTION, SOLUTION INTRAVENOUS at 06:00

## 2019-08-29 RX ADMIN — MIDODRINE HYDROCHLORIDE 10 MG: 5 TABLET ORAL at 08:17

## 2019-08-29 RX ADMIN — Medication 8 MCG/MIN: at 01:20

## 2019-08-29 RX ADMIN — ASCORBIC ACID 1500 MG: 500 INJECTION, SOLUTION INTRAMUSCULAR; INTRAVENOUS; SUBCUTANEOUS at 07:45

## 2019-08-29 RX ADMIN — METRONIDAZOLE 500 MG: 500 INJECTION, SOLUTION INTRAVENOUS at 13:40

## 2019-08-29 RX ADMIN — LIDOCAINE HYDROCHLORIDE: 10 INJECTION, SOLUTION EPIDURAL; INFILTRATION; INTRACAUDAL at 06:49

## 2019-08-29 RX ADMIN — THIAMINE HYDROCHLORIDE 200 MG: 100 INJECTION, SOLUTION INTRAMUSCULAR; INTRAVENOUS at 08:19

## 2019-08-29 RX ADMIN — ASCORBIC ACID 1500 MG: 500 INJECTION, SOLUTION INTRAMUSCULAR; INTRAVENOUS; SUBCUTANEOUS at 01:22

## 2019-08-29 RX ADMIN — ATORVASTATIN CALCIUM 20 MG: 10 TABLET, FILM COATED ORAL at 20:47

## 2019-08-29 RX ADMIN — INSULIN LISPRO 2 UNITS: 100 INJECTION, SOLUTION INTRAVENOUS; SUBCUTANEOUS at 15:21

## 2019-08-29 RX ADMIN — ALBUMIN (HUMAN) 25 G: 0.25 INJECTION, SOLUTION INTRAVENOUS at 11:54

## 2019-08-29 RX ADMIN — Medication 10 ML: at 20:48

## 2019-08-29 RX ADMIN — ALBUMIN (HUMAN) 25 G: 0.25 INJECTION, SOLUTION INTRAVENOUS at 05:56

## 2019-08-29 RX ADMIN — INSULIN LISPRO 2 UNITS: 100 INJECTION, SOLUTION INTRAVENOUS; SUBCUTANEOUS at 00:14

## 2019-08-29 RX ADMIN — FLUDROCORTISONE ACETATE 0.1 MG: 0.1 TABLET ORAL at 08:17

## 2019-08-29 RX ADMIN — Medication 10 ML: at 08:18

## 2019-08-29 RX ADMIN — INSULIN LISPRO 2 UNITS: 100 INJECTION, SOLUTION INTRAVENOUS; SUBCUTANEOUS at 11:55

## 2019-08-29 RX ADMIN — METRONIDAZOLE 500 MG: 500 INJECTION, SOLUTION INTRAVENOUS at 20:48

## 2019-08-29 RX ADMIN — HYDROCORTISONE SODIUM SUCCINATE 50 MG: 100 INJECTION, POWDER, FOR SOLUTION INTRAMUSCULAR; INTRAVENOUS at 08:16

## 2019-08-29 RX ADMIN — ASCORBIC ACID 1500 MG: 500 INJECTION, SOLUTION INTRAMUSCULAR; INTRAVENOUS; SUBCUTANEOUS at 13:04

## 2019-08-29 RX ADMIN — ALBUMIN (HUMAN) 25 G: 0.25 INJECTION, SOLUTION INTRAVENOUS at 18:13

## 2019-08-29 RX ADMIN — MIDODRINE HYDROCHLORIDE 10 MG: 5 TABLET ORAL at 11:54

## 2019-08-29 RX ADMIN — INSULIN LISPRO 1 UNITS: 100 INJECTION, SOLUTION INTRAVENOUS; SUBCUTANEOUS at 04:07

## 2019-08-29 RX ADMIN — PANTOPRAZOLE SODIUM 40 MG: 40 INJECTION, POWDER, FOR SOLUTION INTRAVENOUS at 08:16

## 2019-08-29 RX ADMIN — HYDROCORTISONE SODIUM SUCCINATE 50 MG: 100 INJECTION, POWDER, FOR SOLUTION INTRAMUSCULAR; INTRAVENOUS at 01:25

## 2019-08-29 RX ADMIN — MIDODRINE HYDROCHLORIDE 10 MG: 5 TABLET ORAL at 17:03

## 2019-08-29 RX ADMIN — HYDROCORTISONE SODIUM SUCCINATE 50 MG: 100 INJECTION, POWDER, FOR SOLUTION INTRAMUSCULAR; INTRAVENOUS at 13:04

## 2019-08-29 RX ADMIN — INSULIN LISPRO 3 UNITS: 100 INJECTION, SOLUTION INTRAVENOUS; SUBCUTANEOUS at 20:54

## 2019-08-29 RX ADMIN — CEFTRIAXONE SODIUM 2 G: 2 INJECTION, POWDER, FOR SOLUTION INTRAMUSCULAR; INTRAVENOUS at 11:55

## 2019-08-29 RX ADMIN — INSULIN LISPRO 2 UNITS: 100 INJECTION, SOLUTION INTRAVENOUS; SUBCUTANEOUS at 08:29

## 2019-08-29 ASSESSMENT — PAIN SCALES - GENERAL
PAINLEVEL_OUTOF10: 0

## 2019-08-29 NOTE — PROGRESS NOTES
Pt required cues and education as noted above for safe facilitation and completion of tasks. During functional activites and ADLs pt educated on proper hand placement, safety technique, sequencing, and energy conservation techniques. Therapist provided skilled monitoring of HR, O2 saturation, blood pressure and patient's response during treatment session. Prior to and at the end of session, environmental modifications/line management completed for patients safety and efficiency of treatment session. Eval Complexity:   · Medium Complexity  · History: Expanded review of medical records and additional review of physical, cognitive, or psychosocial history related to current functional performance  · Exam: 3+ performance deficits  · Assistance/Modification: Min/mod assistance or modifications required to perform tasks. May have comorbidities that affect occupational performance. Assessment of current deficits   Functional mobility [x]  ADLs [x] Strength [x]  Cognition []  Functional transfers  [x] IADLs [x] Safety Awareness [x]  Endurance [x]  Fine Motor Coordination [] Balance [x] Vision/perception [] Sensation []   Gross Motor Coordination [] ROM [] Delirium []                  Motor Control []    Plan of Care:  ADL retraining [x]   Equipment needs [x]   Neuromuscular re-education [] Energy Conservation Techniques [x]  Functional Transfer training [x] Patient and/or Family Education [x]  Functional Mobility training [x]  Environmental Modifications [x]  Cognitive re-training []   Compensatory techniques for ADLs [x]  Splinting Needs []   Positioning to improve overall function [x]   Therapeutic Activity [x]                       Therapeutic Exercise  [x]  Visual/Perceptual: []    Delirium prevention/treatment  [x]   Other:  []    Rehab Potential: Good for established goals    Patient / Family Goal: None stated     Patient and/or family were instructed/educated on diagnosis, prognosis/goals and plan of care.

## 2019-08-29 NOTE — PROGRESS NOTES
[facility] PROGRESS NOTE      Chief complaint: Follow-up of Gram-negative gia bacteremia and bacterial peritonitis    The patient is a 82 y.o. male with history of atrial fibrillation, CKD, DM, hypertension, hyperlipidemia, presented on 8/27 with 1 week history of worsening abdominal distention later accompanied by 1-2 episodes of soft stools and confusion, found to be in septic shock with gram-negative gia bacteremia and CT of the abdomen and pelvis showing cirrhosis, multiple lesions in the liver suggestive of metastases, moderate ascites, cholelithiasis, bilateral infiltrates at lung bases suggestive of atelectasis. He underwent paracentesis yielding peritoneal fluid with PMNs of 558. Peritoneal fluid Gram stain and culture showed moderate leukocytes, no epithelial cells, no organisms, no growth to date. Urinalysis showed no pyuria with urine culture showing no growth to date. Cefepime for 1 dose was given then switched to ceftriaxone and metronidazole. Subjective: Patient was seen and examined. No chills, no abdominal pain, no diarrhea, no rash, no itching. Objective:  BP (!) 97/46   Pulse 60   Temp 97.7 °F (36.5 °C)   Resp 16   Ht 5' 8\" (1.727 m)   Wt 184 lb 4 oz (83.6 kg)   SpO2 91%   BMI 28.02 kg/m²   Constitutional: Alert, not in distress  Respiratory: Clear breath sounds, no crackles, no wheezes  Cardiovascular: Regular rate and rhythm, no murmurs  Gastrointestinal: Abdomen distended, bowel sounds present, soft, nontender  Skin: Warm and dry, no active dermatoses  Musculoskeletal: No joint swelling, no joint erythema    Labs, imaging, and medical records/notes were personally reviewed. Assessment:  Septic shock, secondary to Gram-negative gia bacteremia, likely from spontaneous bacterial peritonitis    Recommendations:  Continue ceftriaxone at 2 g every 24 hours and metronidazole 500 mg every 8 hours for now. Follow-up blood and peritoneal fluid cultures.   Repeat US-guided paracentesis

## 2019-08-29 NOTE — PROGRESS NOTES
[facility]  Department of Internal Medicine   Internal Medicine Residency   MICU Progress Note    Patient:  Aurelia Jefferson. 82 y.o. male  MRN: [id number]     Date of Service: 8/29/2019    Allergy: Patient has no known allergies. Follow sepsis  Subjective      Patient seen and examined at bedside; no acute complaints; initially failed to put arterial line but able to wean pressors.  On metro and flagyl for SBP   Mets need to be worked up  Stable to transfer out of icu  Objective     VS: BP (!) 96/50   Pulse 56   Temp 97.6 °F (36.4 °C) (Temporal)   Resp 20   Ht 5' 8\" (1.727 m)   Wt 184 lb 4 oz (83.6 kg)   SpO2 96%   BMI 28.02 kg/m²   ABP (Arterial line BP): (!) 9/8  ABP mean (Arterial line mean): (!) 8 mmHg    I & O - 24hr:     Intake/Output Summary (Last 24 hours) at 8/29/2019 1823  Last data filed at 8/29/2019 1700  Gross per 24 hour   Intake 3853 ml   Output 1580 ml   Net 2273 ml       Physical Exam:  · General Appearance: alert, appears older than stated age, cooperative, mild distress, pale and slowed mentation  · Neck: no adenopathy, no carotid bruit, no JVD, supple, symmetrical, trachea midline and thyroid not enlarged, symmetric, no tenderness/mass/nodules; varices and spider angioma on face; jaundice and scleral icterus   · Lung: clear to auscultation bilaterally  · Heart: irregularly irregular rhythm and S1, S2 normal; no murmurs or other heart sounds   · Abdomen: edema on BL flanks 3+ pitting in nature; no pain with palpation or tenderness; less ascites after paracentesis; FOB +; caput medussa on abdomen   · Extremities:  Edema in right LE > than LLE; moves them to command; pulses intact   · Musculoskeletal: No joint swelling, no muscle tenderness. ROM normal in all joints of extremities.    · Neurologic: Mental status: Alert, oriented, thought content appropriate    Lines     site day    Art line   None    TLC R IJ    PICC None    Hemoaccess None    Oxygen:     nasal canula

## 2019-08-29 NOTE — PROGRESS NOTES
atraumatic. Eyes: Pupils are equal, round, and reactive to light. Conjunctivae and EOM are normal. Right eye exhibits no discharge. Left eye exhibits no discharge. No scleral icterus. Neck: Normal range of motion. No JVD present. No tracheal deviation present. No thyromegaly present. Cardiovascular: Normal rate, regular rhythm and normal heart sounds. Exam reveals no gallop and no friction rub.   No murmur heard. distal pulses weak, symmetrical   Pulmonary/Chest: Effort normal and breath sounds normal. No respiratory distress. He has no wheezes. He has no rales. He exhibits no tenderness. Abdominal: He exhibits distension. He exhibits no mass. There is no tenderness. There is no rebound and no guarding. Musculoskeletal: He exhibits edema and tenderness. He exhibits no deformity.   3+ edema right leg mid-shin, +2 edema left leg mid-shin   Lymphadenopathy:     He has no cervical adenopathy. Neurological: He is alert and oriented to person, place, and time. He exhibits normal muscle tone. Skin: Skin is warm and dry. He is not diaphoretic. No erythema.   pale-yellow skin on abdomen   Psychiatric: He has a normal mood and affect. His behavior is normal.    Labs:  Na/K/Cl/CO2:  138/3.6, 3.6/104/18 (08/29 0400)  BUN/Cr/glu/ALT/AST/amyl/lip:  83/2.1/--/27/28/--/-- (08/29 0400)  WBC/Hgb/Hct/Plts:  23.1/10.3/29.6/156 (08/29 0400)  estimated creatinine clearance is 29 mL/min (A) (based on SCr of 2.1 mg/dL (H)). Other pertinent labs as noted below    Procedure Component Value Units Date/Time   Urine Culture [[id number]] Collected: 08/27/19 1220   Order Status: Completed Specimen: Urine, clean catch Updated: 08/28/19 1226    Urine Culture, Routine Growth not present, incubation continues   Narrative:     Source: URINE       Site:              Body Fluid Culture [[id number]] Collected: 08/27/19 1900   Order Status: Completed Specimen:  Body Fluid from Ascitic Fluid Updated: 08/28/19 1003    Body Fluid Culture, Range: Not Detected     Film Array Parainfluenza Virus 3 --    Result: Not Detected   *   *   Normal Range: Not Detected     Film Array Parainfluenza Virus 4 --    Result: Not Detected   *   *   Normal Range: Not Detected     Film Array Respiratory Syncitial Virus --    Result: Not Detected   *   *   Normal Range: Not Detected     Film Array Rhinovirus/Enterovirus --    Result: Not Detected   *   *   Normal Range: Not Detected     Film Array Bordetella Pertusis --    Result: Not Detected   *   *   Normal Range: Not Detected     Film Array Chlamydophilia Pneumoniae --    Result: Not Detected   *   *   Normal Range: Not Detected     Film Array Mycoplasma Pneumoniae --    Result: Not Detected   *   *   Normal Range: Not Detected    Narrative:         New Imaging:  US DUP LOWER EXTREMITIES BILATERAL VENOUS   Final Result   No evidence for deep venous thrombosis in the bilateral lower   extremities. XR Acute Abd Series Chest 1 VW   Final Result   1. No radiographic evidence of large volume free intraperitoneal air. Please note that smaller volumes of free intraperitoneal air may or   may not be visible on abdominal radiographs. If there is persistent   clinical concern for free intraperitoneal air, CT scan the abdomen and   pelvis is recommended. 2. Stable, enlarged cardiac mediastinal silhouette with underlying   mild central pulmonary vascular congestion and thoracic aortic   vascular calcifications. 3. Nonspecific bibasilar airspace disease, findings can be seen in   infiltrate/pneumonia and/or atelectasis. Brandon Sosa 4. 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 5. Gaseous distended of the stomach in the midline. XR CHEST PORTABLE   Final Result   Mild CHF without edema. Right IJ central line in the superior vena cava without complications.             CT ABDOMEN PELVIS WO CONTRAST   Final Result   There are bilateral infiltrates at the lung bases, likely related to   atelectasis

## 2019-08-30 ENCOUNTER — APPOINTMENT (OUTPATIENT)
Dept: ULTRASOUND IMAGING | Age: 84
DRG: 871 | End: 2019-08-30
Payer: MEDICARE

## 2019-08-30 ENCOUNTER — APPOINTMENT (OUTPATIENT)
Dept: CT IMAGING | Age: 84
DRG: 871 | End: 2019-08-30
Payer: MEDICARE

## 2019-08-30 ENCOUNTER — APPOINTMENT (OUTPATIENT)
Dept: INTERVENTIONAL RADIOLOGY/VASCULAR | Age: 84
DRG: 871 | End: 2019-08-30
Payer: MEDICARE

## 2019-08-30 LAB
ALBUMIN SERPL-MCNC: 4.3 G/DL (ref 3.5–5.2)
ALP BLD-CCNC: 39 U/L (ref 40–129)
ALT SERPL-CCNC: 22 U/L (ref 0–40)
ANION GAP SERPL CALCULATED.3IONS-SCNC: 17 MMOL/L (ref 7–16)
APPEARANCE FLUID: NORMAL
AST SERPL-CCNC: 15 U/L (ref 0–39)
BASOPHILS ABSOLUTE: 0.01 E9/L (ref 0–0.2)
BASOPHILS RELATIVE PERCENT: 0.1 % (ref 0–2)
BILIRUB SERPL-MCNC: 1.9 MG/DL (ref 0–1.2)
BILIRUBIN DIRECT: 0.8 MG/DL (ref 0–0.3)
BILIRUBIN, INDIRECT: 1.1 MG/DL (ref 0–1)
BLOOD CULTURE, ROUTINE: ABNORMAL
BODY FLUID CULTURE, STERILE: NORMAL
BUN BLDV-MCNC: 84 MG/DL (ref 8–23)
CA 19-9: 3 U/ML (ref 0–37)
CALCIUM SERPL-MCNC: 8.2 MG/DL (ref 8.6–10.2)
CELL COUNT FLUID TYPE: NORMAL
CHLORIDE BLD-SCNC: 103 MMOL/L (ref 98–107)
CO2: 20 MMOL/L (ref 22–29)
COLOR FLUID: YELLOW
CREAT SERPL-MCNC: 2 MG/DL (ref 0.7–1.2)
CULTURE, BLOOD 2: ABNORMAL
EOSINOPHILS ABSOLUTE: 0 E9/L (ref 0.05–0.5)
EOSINOPHILS RELATIVE PERCENT: 0 % (ref 0–6)
GFR AFRICAN AMERICAN: 39
GFR NON-AFRICAN AMERICAN: 32 ML/MIN/1.73
GLUCOSE BLD-MCNC: 179 MG/DL (ref 74–99)
GRAM STAIN RESULT: NORMAL
HCT VFR BLD CALC: 25.7 % (ref 37–54)
HCT VFR BLD CALC: 26 % (ref 37–54)
HCT VFR BLD CALC: 27.9 % (ref 37–54)
HEMOGLOBIN: 8.6 G/DL (ref 12.5–16.5)
HEMOGLOBIN: 8.9 G/DL (ref 12.5–16.5)
HEMOGLOBIN: 9.6 G/DL (ref 12.5–16.5)
IMMATURE GRANULOCYTES #: 0.09 E9/L
IMMATURE GRANULOCYTES %: 1 % (ref 0–5)
INR BLD: 1.8
LYMPHOCYTES ABSOLUTE: 0.43 E9/L (ref 1.5–4)
LYMPHOCYTES RELATIVE PERCENT: 4.6 % (ref 20–42)
MAGNESIUM: 2.3 MG/DL (ref 1.6–2.6)
MCH RBC QN AUTO: 30.6 PG (ref 26–35)
MCHC RBC AUTO-ENTMCNC: 33.5 % (ref 32–34.5)
MCV RBC AUTO: 91.5 FL (ref 80–99.9)
METER GLUCOSE: 154 MG/DL (ref 74–99)
METER GLUCOSE: 176 MG/DL (ref 74–99)
METER GLUCOSE: 188 MG/DL (ref 74–99)
METER GLUCOSE: 188 MG/DL (ref 74–99)
METER GLUCOSE: 201 MG/DL (ref 74–99)
METER GLUCOSE: 214 MG/DL (ref 74–99)
MONOCYTE, FLUID: 85 %
MONOCYTES ABSOLUTE: 0.54 E9/L (ref 0.1–0.95)
MONOCYTES RELATIVE PERCENT: 5.7 % (ref 2–12)
NEUTROPHIL, FLUID: 15 %
NEUTROPHILS ABSOLUTE: 8.35 E9/L (ref 1.8–7.3)
NEUTROPHILS RELATIVE PERCENT: 88.6 % (ref 43–80)
NUCLEATED CELLS FLUID: 632 /UL
ORGANISM: ABNORMAL
ORGANISM: ABNORMAL
PDW BLD-RTO: 15.2 FL (ref 11.5–15)
PLATELET # BLD: 112 E9/L (ref 130–450)
PMV BLD AUTO: 12.1 FL (ref 7–12)
POTASSIUM REFLEX MAGNESIUM: 3.2 MMOL/L (ref 3.5–5)
PROTHROMBIN TIME: 20.2 SEC (ref 9.3–12.4)
RBC # BLD: 2.81 E12/L (ref 3.8–5.8)
RBC FLUID: 4000 /UL
SODIUM BLD-SCNC: 140 MMOL/L (ref 132–146)
TOTAL PROTEIN: 5.7 G/DL (ref 6.4–8.3)
WBC # BLD: 9.4 E9/L (ref 4.5–11.5)

## 2019-08-30 PROCEDURE — 6370000000 HC RX 637 (ALT 250 FOR IP): Performed by: INTERNAL MEDICINE

## 2019-08-30 PROCEDURE — 2060000000 HC ICU INTERMEDIATE R&B

## 2019-08-30 PROCEDURE — 49083 ABD PARACENTESIS W/IMAGING: CPT

## 2019-08-30 PROCEDURE — 85025 COMPLETE CBC W/AUTO DIFF WBC: CPT

## 2019-08-30 PROCEDURE — 6360000002 HC RX W HCPCS: Performed by: INTERNAL MEDICINE

## 2019-08-30 PROCEDURE — 82962 GLUCOSE BLOOD TEST: CPT

## 2019-08-30 PROCEDURE — 2500000003 HC RX 250 WO HCPCS: Performed by: PHYSICIAN ASSISTANT

## 2019-08-30 PROCEDURE — 80048 BASIC METABOLIC PNL TOTAL CA: CPT

## 2019-08-30 PROCEDURE — 93975 VASCULAR STUDY: CPT

## 2019-08-30 PROCEDURE — 71250 CT THORAX DX C-: CPT

## 2019-08-30 PROCEDURE — 99232 SBSQ HOSP IP/OBS MODERATE 35: CPT | Performed by: FAMILY MEDICINE

## 2019-08-30 PROCEDURE — 83735 ASSAY OF MAGNESIUM: CPT

## 2019-08-30 PROCEDURE — 2580000003 HC RX 258: Performed by: INTERNAL MEDICINE

## 2019-08-30 PROCEDURE — 2500000003 HC RX 250 WO HCPCS: Performed by: INTERNAL MEDICINE

## 2019-08-30 PROCEDURE — 36556 INSERT NON-TUNNEL CV CATH: CPT

## 2019-08-30 PROCEDURE — 85014 HEMATOCRIT: CPT

## 2019-08-30 PROCEDURE — 36415 COLL VENOUS BLD VENIPUNCTURE: CPT

## 2019-08-30 PROCEDURE — C1729 CATH, DRAINAGE: HCPCS

## 2019-08-30 PROCEDURE — 85610 PROTHROMBIN TIME: CPT

## 2019-08-30 PROCEDURE — 80076 HEPATIC FUNCTION PANEL: CPT

## 2019-08-30 PROCEDURE — 85018 HEMOGLOBIN: CPT

## 2019-08-30 PROCEDURE — P9047 ALBUMIN (HUMAN), 25%, 50ML: HCPCS | Performed by: INTERNAL MEDICINE

## 2019-08-30 PROCEDURE — 89051 BODY FLUID CELL COUNT: CPT

## 2019-08-30 PROCEDURE — 76705 ECHO EXAM OF ABDOMEN: CPT

## 2019-08-30 RX ORDER — LIDOCAINE HYDROCHLORIDE 20 MG/ML
5 INJECTION, SOLUTION INFILTRATION; PERINEURAL ONCE
Status: COMPLETED | OUTPATIENT
Start: 2019-08-30 | End: 2019-08-30

## 2019-08-30 RX ORDER — POTASSIUM CHLORIDE 20 MEQ/1
20 TABLET, EXTENDED RELEASE ORAL ONCE
Status: DISCONTINUED | OUTPATIENT
Start: 2019-08-30 | End: 2019-08-31

## 2019-08-30 RX ADMIN — Medication 10 ML: at 20:22

## 2019-08-30 RX ADMIN — Medication 10 ML: at 09:23

## 2019-08-30 RX ADMIN — WATER 2 ML: 1 INJECTION INTRAMUSCULAR; INTRAVENOUS; SUBCUTANEOUS at 04:11

## 2019-08-30 RX ADMIN — MIDODRINE HYDROCHLORIDE 10 MG: 5 TABLET ORAL at 09:22

## 2019-08-30 RX ADMIN — INSULIN LISPRO 1 UNITS: 100 INJECTION, SOLUTION INTRAVENOUS; SUBCUTANEOUS at 06:49

## 2019-08-30 RX ADMIN — ALBUMIN (HUMAN) 25 G: 0.25 INJECTION, SOLUTION INTRAVENOUS at 05:15

## 2019-08-30 RX ADMIN — Medication 10 ML: at 04:14

## 2019-08-30 RX ADMIN — INSULIN LISPRO 2 UNITS: 100 INJECTION, SOLUTION INTRAVENOUS; SUBCUTANEOUS at 04:27

## 2019-08-30 RX ADMIN — HYDROCORTISONE SODIUM SUCCINATE 50 MG: 100 INJECTION, POWDER, FOR SOLUTION INTRAMUSCULAR; INTRAVENOUS at 17:00

## 2019-08-30 RX ADMIN — MIDODRINE HYDROCHLORIDE 10 MG: 5 TABLET ORAL at 17:00

## 2019-08-30 RX ADMIN — ALBUMIN (HUMAN) 25 G: 0.25 INJECTION, SOLUTION INTRAVENOUS at 00:46

## 2019-08-30 RX ADMIN — ALBUMIN (HUMAN) 25 G: 0.25 INJECTION, SOLUTION INTRAVENOUS at 11:38

## 2019-08-30 RX ADMIN — METRONIDAZOLE 500 MG: 500 INJECTION, SOLUTION INTRAVENOUS at 04:13

## 2019-08-30 RX ADMIN — ATORVASTATIN CALCIUM 20 MG: 10 TABLET, FILM COATED ORAL at 20:22

## 2019-08-30 RX ADMIN — INSULIN LISPRO 1 UNITS: 100 INJECTION, SOLUTION INTRAVENOUS; SUBCUTANEOUS at 20:21

## 2019-08-30 RX ADMIN — LIDOCAINE HYDROCHLORIDE 5 ML: 20 INJECTION, SOLUTION EPIDURAL; INFILTRATION; INTRACAUDAL; PERINEURAL at 13:59

## 2019-08-30 RX ADMIN — CEFTRIAXONE SODIUM 2 G: 2 INJECTION, POWDER, FOR SOLUTION INTRAMUSCULAR; INTRAVENOUS at 11:38

## 2019-08-30 RX ADMIN — Medication 10 ML: at 00:46

## 2019-08-30 RX ADMIN — INSULIN LISPRO 1 UNITS: 100 INJECTION, SOLUTION INTRAVENOUS; SUBCUTANEOUS at 00:48

## 2019-08-30 RX ADMIN — WATER 2 ML: 1 INJECTION INTRAMUSCULAR; INTRAVENOUS; SUBCUTANEOUS at 17:01

## 2019-08-30 RX ADMIN — INSULIN LISPRO 2 UNITS: 100 INJECTION, SOLUTION INTRAVENOUS; SUBCUTANEOUS at 11:37

## 2019-08-30 RX ADMIN — INSULIN LISPRO 1 UNITS: 100 INJECTION, SOLUTION INTRAVENOUS; SUBCUTANEOUS at 16:57

## 2019-08-30 RX ADMIN — HYDROCORTISONE SODIUM SUCCINATE 50 MG: 100 INJECTION, POWDER, FOR SOLUTION INTRAMUSCULAR; INTRAVENOUS at 04:11

## 2019-08-30 RX ADMIN — MIDODRINE HYDROCHLORIDE 10 MG: 5 TABLET ORAL at 11:37

## 2019-08-30 RX ADMIN — Medication 10 ML: at 04:12

## 2019-08-30 ASSESSMENT — PAIN SCALES - GENERAL
PAINLEVEL_OUTOF10: 0

## 2019-08-30 ASSESSMENT — PAIN - FUNCTIONAL ASSESSMENT
PAIN_FUNCTIONAL_ASSESSMENT: 0-10
PAIN_FUNCTIONAL_ASSESSMENT: 0-10

## 2019-08-30 NOTE — PROGRESS NOTES
[facility] PROGRESS NOTE      Chief complaint: Follow-up of E coli bacteremia and bacterial peritonitis    The patient is a 82 y.o. male with history of atrial fibrillation, CKD, DM, hypertension, hyperlipidemia, presented on 8/27 with 1 week history of worsening abdominal distention later accompanied by 1-2 episodes of soft stools and confusion, found to be in septic shock with E coli (non-ESBL, non-MDR) bacteremia and CT of the abdomen and pelvis showing cirrhosis, multiple lesions in the liver suggestive of metastases, moderate ascites, cholelithiasis, bilateral infiltrates at lung bases suggestive of atelectasis. He underwent paracentesis yielding peritoneal fluid with PMNs of 558. Peritoneal fluid Gram stain and culture showed moderate leukocytes, no epithelial cells, no organisms, no growth to date. Peritoneal fluid cytology showed no malignant cells. Urinalysis showed no pyuria with urine culture showing no growth. Cefepime for 1 dose was given then switched to ceftriaxone and metronidazole. Subjective: Patient was seen and examined. No chills, no abdominal pain, no diarrhea, no rash, no itching. Objective:  BP (!) 93/52   Pulse 57   Temp 98.2 °F (36.8 °C) (Temporal)   Resp 18   Ht 5' 8\" (1.727 m)   Wt 183 lb 3.2 oz (83.1 kg)   SpO2 95%   BMI 27.86 kg/m²   Constitutional: Alert, not in distress  Respiratory: Clear breath sounds, no crackles, no wheezes  Cardiovascular: Regular rate and rhythm, no murmurs  Gastrointestinal: Abdomen distended, bowel sounds present, soft, nontender  Skin: Warm and dry, no active dermatoses  Musculoskeletal: No joint swelling, no joint erythema    Labs, imaging, and medical records/notes were personally reviewed. Assessment:  E coli bacteremia, likely from spontaneous bacterial peritonitis  Septic shock, resolved    Recommendations:  Continue ceftriaxone at 2 g every 24 hours. Discontinue metronidazole. Follow-up blood and peritoneal fluid cultures.   Repeat

## 2019-08-30 NOTE — PROGRESS NOTES
Neck: Normal range of motion. No JVD present. No tracheal deviation present. No thyromegaly present. Cardiovascular: Normal rate, regular rhythm and normal heart sounds. Exam reveals no gallop and no friction rub.   No murmur heard. distal pulses weak, symmetrical   Pulmonary/Chest: Effort normal and breath sounds normal. No respiratory distress. He has no wheezes. He has no rales. He exhibits no tenderness. Abdominal: He exhibits distension. He exhibits no mass. There is no tenderness. There is no rebound and no guarding. Musculoskeletal: He exhibits edema and tenderness. He exhibits no deformity.   3+ edema right leg mid-shin, +2 edema left leg mid-shin   Lymphadenopathy:     He has no cervical adenopathy. Neurological: He is alert and oriented to person, place, and time. He exhibits normal muscle tone. Skin: Skin is warm and dry. He is not diaphoretic. No erythema.   pale-yellow skin on abdomen   Psychiatric: He has a normal mood and affect. His behavior is normal.    Labs:    Labs:  Na/K/Cl/CO2:  140/3.5/104/19 (08/29 1520)  BUN/Cr/glu/ALT/AST/amyl/lip:  80/2.0/--/--/--/--/-- (08/29 1520)  WBC/Hgb/Hct/Plts:  --/8.9/26.0/-- (08/30 0600)  estimated creatinine clearance is 30 mL/min (A) (based on SCr of 2 mg/dL (H)).   Other pertinent labs as noted below  Procedure Component Value Units Date/Time   Culture blood #2 [[id number]] (Abnormal) Collected: 08/27/19 0334   Order Status: Completed Specimen: Blood Updated: 08/30/19 0640    Organism Escherichia coliAbnormal     Culture, Blood 2 --    Refer to previous culture for susceptibility results   of CXBL collected 08/27/2019 at 03:30    Narrative:     CALL  [facility]  H44 tel. ,  Microbiology results called to and read back by Chino Seymour RN, 08/28/2019  08:42, by [facility]   Culture blood #1 [[id number]] (Abnormal)  Collected: 08/27/19 0330   Order Status: Completed Specimen: Blood Updated: 08/30/19 0639    Blood Culture, Routine Previously positive blood culture Detected     Film Array Respiratory Syncitial Virus --    Result: Not Detected   *   *   Normal Range: Not Detected     Film Array Rhinovirus/Enterovirus --    Result: Not Detected   *   *   Normal Range: Not Detected     Film Array Bordetella Pertusis --    Result: Not Detected   *   *   Normal Range: Not Detected     Film Array Chlamydophilia Pneumoniae --    Result: Not Detected   *   *   Normal Range: Not Detected     Film Array Mycoplasma Pneumoniae --    Result: Not Detected   *   *   Normal Range: Not Detected    Narrative:         New Imaging:  XR CHEST PORTABLE   Final Result   1. Stable, large cardiomediastinal 11 thoracic aortic vascular   calcification mild central pulmonary vascular congestion. 2. Suspected bibasilar atelectasis. US DUP LOWER EXTREMITIES BILATERAL VENOUS   Final Result   No evidence for deep venous thrombosis in the bilateral lower   extremities. XR Acute Abd Series Chest 1 VW   Final Result   1. No radiographic evidence of large volume free intraperitoneal air. Please note that smaller volumes of free intraperitoneal air may or   may not be visible on abdominal radiographs. If there is persistent   clinical concern for free intraperitoneal air, CT scan the abdomen and   pelvis is recommended. 2. Stable, enlarged cardiac mediastinal silhouette with underlying   mild central pulmonary vascular congestion and thoracic aortic   vascular calcifications. 3. Nonspecific bibasilar airspace disease, findings can be seen in   infiltrate/pneumonia and/or atelectasis. [place] 4. 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 5. Gaseous distended of the stomach in the midline. XR CHEST PORTABLE   Final Result   Mild CHF without edema. Right IJ central line in the superior vena cava without complications.             CT ABDOMEN PELVIS WO CONTRAST   Final Result   There are bilateral infiltrates at the lung bases, likely related to   atelectasis     There is

## 2019-08-30 NOTE — PROGRESS NOTES
1300-Patient arrived via cart as an inpatient to Radiology department for image guided paracentesis. Allergies, medications, H&P and fasting instructions reviewed with patient. Vital signs taken. Procedural instructions given, questions answered, understanding expressed and consent signed.

## 2019-08-30 NOTE — CARE COORDINATION
nH predict tool uploaded. Recommendation is SNF. Plan remians in progress-SNF vs home with [facility].

## 2019-08-30 NOTE — PROGRESS NOTES
Department of Internal Medicine  Nephrology Attending Consult Note      SUBJECTIVE: We are following Mr. Sanchez for HELENA on CKD. Reports feeling better.      PHYSICAL EXAM:      Vitals:    VITALS:  BP (!) 98/59   Pulse 61   Temp 97.8 °F (36.6 °C) (Temporal)   Resp 18   Ht 5' 8\" (1.727 m)   Wt 183 lb 3.2 oz (83.1 kg)   SpO2 96%   BMI 27.86 kg/m²   24HR INTAKE/OUTPUT:      Intake/Output Summary (Last 24 hours) at 8/30/2019 1848  Last data filed at 8/30/2019 1813  Gross per 24 hour   Intake 720 ml   Output 525 ml   Net 195 ml       Constitutional: Patient is awake in no distress  HEENT: Pupils are equal reactive  Respiratory: Decreased breath sounds at bases  Cardiovascular/Edema: Heart sounds are regular rate  Gastrointestinal: Abdomen soft, nontender  Neurologic: Patient is awake, nonfocal  Skin: No lesions  Other: 2+ abdominal wall edema, and 1+ thigh edema    Scheduled Meds:   sterile water  2 mL Injection Q12H    potassium chloride  20 mEq Oral Once    hydrocortisone sodium succinate PF  50 mg Intravenous Q12H    atorvastatin  20 mg Oral Daily    insulin lispro  0-6 Units Subcutaneous Q4H    cefTRIAXone (ROCEPHIN) IV  2 g Intravenous Q24H    midodrine  10 mg Oral TID WC    sodium chloride flush  10 mL Intravenous 2 times per day     Continuous Infusions:   dextrose       PRN Meds:.mineral oil-hydrophilic petrolatum, sodium chloride flush, magnesium hydroxide, ondansetron, glucose, dextrose, glucagon (rDNA), dextrose    DATA:    CBC:   Lab Results   Component Value Date    WBC 9.4 08/30/2019    RBC 2.81 08/30/2019    HGB 9.6 08/30/2019    HCT 27.9 08/30/2019    MCV 91.5 08/30/2019    MCH 30.6 08/30/2019    MCHC 33.5 08/30/2019    RDW 15.2 08/30/2019     08/30/2019    MPV 12.1 08/30/2019     CMP:    Lab Results   Component Value Date     08/30/2019    K 3.2 08/30/2019     08/30/2019    CO2 20 08/30/2019    BUN 84 08/30/2019    CREATININE 2.0 08/30/2019    GFRAA 39 08/30/2019    LABGLOM

## 2019-08-31 LAB
ABO/RH: NORMAL
ALBUMIN SERPL-MCNC: 3.7 G/DL (ref 3.5–5.2)
ALP BLD-CCNC: 38 U/L (ref 40–129)
ALT SERPL-CCNC: 19 U/L (ref 0–40)
ANION GAP SERPL CALCULATED.3IONS-SCNC: 14 MMOL/L (ref 7–16)
ANISOCYTOSIS: ABNORMAL
ANTIBODY SCREEN: NORMAL
AST SERPL-CCNC: 11 U/L (ref 0–39)
BASOPHILS ABSOLUTE: 0.01 E9/L (ref 0–0.2)
BASOPHILS RELATIVE PERCENT: 0.1 % (ref 0–2)
BILIRUB SERPL-MCNC: 1.8 MG/DL (ref 0–1.2)
BILIRUBIN DIRECT: 0.7 MG/DL (ref 0–0.3)
BILIRUBIN, INDIRECT: 1.1 MG/DL (ref 0–1)
BUN BLDV-MCNC: 76 MG/DL (ref 8–23)
BURR CELLS: ABNORMAL
CALCIUM SERPL-MCNC: 7.9 MG/DL (ref 8.6–10.2)
CHLORIDE BLD-SCNC: 108 MMOL/L (ref 98–107)
CO2: 21 MMOL/L (ref 22–29)
CREAT SERPL-MCNC: 1.7 MG/DL (ref 0.7–1.2)
EOSINOPHILS ABSOLUTE: 0 E9/L (ref 0.05–0.5)
EOSINOPHILS RELATIVE PERCENT: 0 % (ref 0–6)
GFR AFRICAN AMERICAN: 47
GFR NON-AFRICAN AMERICAN: 39 ML/MIN/1.73
GLUCOSE BLD-MCNC: 196 MG/DL (ref 74–99)
HCT VFR BLD CALC: 27.8 % (ref 37–54)
HCT VFR BLD CALC: 28.1 % (ref 37–54)
HCT VFR BLD CALC: 28.1 % (ref 37–54)
HCT VFR BLD CALC: 29.1 % (ref 37–54)
HEMOGLOBIN: 9.4 G/DL (ref 12.5–16.5)
HEMOGLOBIN: 9.6 G/DL (ref 12.5–16.5)
HEMOGLOBIN: 9.6 G/DL (ref 12.5–16.5)
HEMOGLOBIN: 9.8 G/DL (ref 12.5–16.5)
IMMATURE GRANULOCYTES #: 0.09 E9/L
IMMATURE GRANULOCYTES %: 1 % (ref 0–5)
IMMATURE RETIC FRACT: 23.9 % (ref 2.3–13.4)
INR BLD: 1.9
LYMPHOCYTES ABSOLUTE: 0.53 E9/L (ref 1.5–4)
LYMPHOCYTES RELATIVE PERCENT: 5.7 % (ref 20–42)
MAGNESIUM: 2.5 MG/DL (ref 1.6–2.6)
MCH RBC QN AUTO: 31.1 PG (ref 26–35)
MCHC RBC AUTO-ENTMCNC: 34.5 % (ref 32–34.5)
MCV RBC AUTO: 90 FL (ref 80–99.9)
METER GLUCOSE: 180 MG/DL (ref 74–99)
METER GLUCOSE: 193 MG/DL (ref 74–99)
METER GLUCOSE: 196 MG/DL (ref 74–99)
METER GLUCOSE: 196 MG/DL (ref 74–99)
METER GLUCOSE: 215 MG/DL (ref 74–99)
MONOCYTES ABSOLUTE: 0.47 E9/L (ref 0.1–0.95)
MONOCYTES RELATIVE PERCENT: 5 % (ref 2–12)
NEUTROPHILS ABSOLUTE: 8.28 E9/L (ref 1.8–7.3)
NEUTROPHILS RELATIVE PERCENT: 88.2 % (ref 43–80)
OVALOCYTES: ABNORMAL
PDW BLD-RTO: 14.9 FL (ref 11.5–15)
PLATELET # BLD: 133 E9/L (ref 130–450)
PMV BLD AUTO: 11.7 FL (ref 7–12)
POIKILOCYTES: ABNORMAL
POLYCHROMASIA: ABNORMAL
POTASSIUM REFLEX MAGNESIUM: 3.1 MMOL/L (ref 3.5–5)
PROCALCITONIN: 9.38 NG/ML (ref 0–0.08)
PROSTATE SPECIFIC ANTIGEN: 1.4 NG/ML (ref 0–4)
PROTHROMBIN TIME: 21.3 SEC (ref 9.3–12.4)
RBC # BLD: 3.09 E12/L (ref 3.8–5.8)
RETIC HGB EQUIVALENT: 30.8 PG (ref 28.2–36.6)
RETICULOCYTE ABSOLUTE COUNT: 0.03 E12/L
RETICULOCYTE COUNT PCT: 0.8 % (ref 0.4–1.9)
SCHISTOCYTES: ABNORMAL
SODIUM BLD-SCNC: 143 MMOL/L (ref 132–146)
TOTAL PROTEIN: 5.2 G/DL (ref 6.4–8.3)
WBC # BLD: 9.4 E9/L (ref 4.5–11.5)

## 2019-08-31 PROCEDURE — 82962 GLUCOSE BLOOD TEST: CPT

## 2019-08-31 PROCEDURE — 82105 ALPHA-FETOPROTEIN SERUM: CPT

## 2019-08-31 PROCEDURE — 6370000000 HC RX 637 (ALT 250 FOR IP): Performed by: INTERNAL MEDICINE

## 2019-08-31 PROCEDURE — 2580000003 HC RX 258: Performed by: INTERNAL MEDICINE

## 2019-08-31 PROCEDURE — 80076 HEPATIC FUNCTION PANEL: CPT

## 2019-08-31 PROCEDURE — 85610 PROTHROMBIN TIME: CPT

## 2019-08-31 PROCEDURE — 99232 SBSQ HOSP IP/OBS MODERATE 35: CPT | Performed by: SURGERY

## 2019-08-31 PROCEDURE — 84165 PROTEIN E-PHORESIS SERUM: CPT

## 2019-08-31 PROCEDURE — 6360000002 HC RX W HCPCS: Performed by: INTERNAL MEDICINE

## 2019-08-31 PROCEDURE — 2060000000 HC ICU INTERMEDIATE R&B

## 2019-08-31 PROCEDURE — 86850 RBC ANTIBODY SCREEN: CPT

## 2019-08-31 PROCEDURE — 36415 COLL VENOUS BLD VENIPUNCTURE: CPT

## 2019-08-31 PROCEDURE — 84145 PROCALCITONIN (PCT): CPT

## 2019-08-31 PROCEDURE — 82784 ASSAY IGA/IGD/IGG/IGM EACH: CPT

## 2019-08-31 PROCEDURE — 6370000000 HC RX 637 (ALT 250 FOR IP): Performed by: STUDENT IN AN ORGANIZED HEALTH CARE EDUCATION/TRAINING PROGRAM

## 2019-08-31 PROCEDURE — 6360000002 HC RX W HCPCS: Performed by: STUDENT IN AN ORGANIZED HEALTH CARE EDUCATION/TRAINING PROGRAM

## 2019-08-31 PROCEDURE — 84153 ASSAY OF PSA TOTAL: CPT

## 2019-08-31 PROCEDURE — 83735 ASSAY OF MAGNESIUM: CPT

## 2019-08-31 PROCEDURE — 85014 HEMATOCRIT: CPT

## 2019-08-31 PROCEDURE — 85018 HEMOGLOBIN: CPT

## 2019-08-31 PROCEDURE — 85025 COMPLETE CBC W/AUTO DIFF WBC: CPT

## 2019-08-31 PROCEDURE — 80048 BASIC METABOLIC PNL TOTAL CA: CPT

## 2019-08-31 PROCEDURE — 86901 BLOOD TYPING SEROLOGIC RH(D): CPT

## 2019-08-31 PROCEDURE — 99232 SBSQ HOSP IP/OBS MODERATE 35: CPT | Performed by: FAMILY MEDICINE

## 2019-08-31 PROCEDURE — 36592 COLLECT BLOOD FROM PICC: CPT

## 2019-08-31 PROCEDURE — 86900 BLOOD TYPING SEROLOGIC ABO: CPT

## 2019-08-31 PROCEDURE — 85045 AUTOMATED RETICULOCYTE COUNT: CPT

## 2019-08-31 RX ORDER — POTASSIUM CHLORIDE 20 MEQ/1
40 TABLET, EXTENDED RELEASE ORAL
Status: DISCONTINUED | OUTPATIENT
Start: 2019-08-31 | End: 2019-09-04

## 2019-08-31 RX ORDER — BUMETANIDE 1 MG/1
2 TABLET ORAL DAILY
Status: DISCONTINUED | OUTPATIENT
Start: 2019-08-31 | End: 2019-09-04 | Stop reason: HOSPADM

## 2019-08-31 RX ORDER — POTASSIUM CHLORIDE 7.45 MG/ML
10 INJECTION INTRAVENOUS
Status: COMPLETED | OUTPATIENT
Start: 2019-08-31 | End: 2019-08-31

## 2019-08-31 RX ADMIN — BUMETANIDE 2 MG: 1 TABLET ORAL at 17:07

## 2019-08-31 RX ADMIN — POTASSIUM CHLORIDE 40 MEQ: 20 TABLET, EXTENDED RELEASE ORAL at 10:26

## 2019-08-31 RX ADMIN — Medication 10 ML: at 20:58

## 2019-08-31 RX ADMIN — ATORVASTATIN CALCIUM 20 MG: 10 TABLET, FILM COATED ORAL at 20:55

## 2019-08-31 RX ADMIN — POTASSIUM CHLORIDE 10 MEQ: 7.46 INJECTION, SOLUTION INTRAVENOUS at 11:14

## 2019-08-31 RX ADMIN — CEFTRIAXONE SODIUM 2 G: 2 INJECTION, POWDER, FOR SOLUTION INTRAMUSCULAR; INTRAVENOUS at 14:15

## 2019-08-31 RX ADMIN — WATER 2 ML: 1 INJECTION INTRAMUSCULAR; INTRAVENOUS; SUBCUTANEOUS at 15:24

## 2019-08-31 RX ADMIN — Medication 10 ML: at 10:27

## 2019-08-31 RX ADMIN — POTASSIUM CHLORIDE 10 MEQ: 7.46 INJECTION, SOLUTION INTRAVENOUS at 10:26

## 2019-08-31 RX ADMIN — WATER 2 ML: 1 INJECTION INTRAMUSCULAR; INTRAVENOUS; SUBCUTANEOUS at 00:33

## 2019-08-31 RX ADMIN — INSULIN LISPRO 1 UNITS: 100 INJECTION, SOLUTION INTRAVENOUS; SUBCUTANEOUS at 00:30

## 2019-08-31 RX ADMIN — INSULIN LISPRO 1 UNITS: 100 INJECTION, SOLUTION INTRAVENOUS; SUBCUTANEOUS at 17:07

## 2019-08-31 RX ADMIN — MIDODRINE HYDROCHLORIDE 10 MG: 5 TABLET ORAL at 17:06

## 2019-08-31 RX ADMIN — MIDODRINE HYDROCHLORIDE 10 MG: 5 TABLET ORAL at 11:53

## 2019-08-31 RX ADMIN — HYDROCORTISONE SODIUM SUCCINATE 50 MG: 100 INJECTION, POWDER, FOR SOLUTION INTRAMUSCULAR; INTRAVENOUS at 00:32

## 2019-08-31 RX ADMIN — MIDODRINE HYDROCHLORIDE 10 MG: 5 TABLET ORAL at 09:00

## 2019-08-31 RX ADMIN — HYDROCORTISONE SODIUM SUCCINATE 50 MG: 100 INJECTION, POWDER, FOR SOLUTION INTRAMUSCULAR; INTRAVENOUS at 15:24

## 2019-08-31 RX ADMIN — INSULIN LISPRO 2 UNITS: 100 INJECTION, SOLUTION INTRAVENOUS; SUBCUTANEOUS at 11:53

## 2019-08-31 RX ADMIN — INSULIN LISPRO 1 UNITS: 100 INJECTION, SOLUTION INTRAVENOUS; SUBCUTANEOUS at 05:07

## 2019-08-31 RX ADMIN — INSULIN LISPRO 1 UNITS: 100 INJECTION, SOLUTION INTRAVENOUS; SUBCUTANEOUS at 21:05

## 2019-08-31 ASSESSMENT — PAIN SCALES - GENERAL
PAINLEVEL_OUTOF10: 0

## 2019-08-31 NOTE — PROGRESS NOTES
[facility]  Family Medicine Attending    S: 82 y.o. male treated for sepsis,which required pressors. req. Had presented with jaundice and RUQ pain, and multiple co morbidities as documented    No current complaints except recurrent abdominal swelling. Had paracentesis yesterday and is feeling much better. Denies any abdominal pain , sob or chest pain currently. Does endorse intermittent sob and chest pain. Denies any nausea or vomiting. O: VS- Blood pressure (!) 122/57, pulse 74, temperature 97.5 °F (36.4 °C), temperature source Temporal, resp. rate 18, height 5' 8\" (1.727 m), weight 174 lb 3.2 oz (79 kg), SpO2 96 %. Exam is as noted by resident. Neck supple    No bruits  Heart RR, no gallop  Lungs clear to auscultation  abd BS+, soft, mildly distended. No TTP  No leg tenderness. 1+ pitting edema in right leg. Impressions:  Septic shock, recovering   Gm neg rods in blood most likely SBP  HELENA  Ascites       Patient Active Problem List   Diagnosis    CAD (coronary artery disease)    A-fib ([address]    HTN (hypertension)    Type 2 diabetes mellitus with complication (HCC)    Hyperlipidemia    Vitamin D deficiency    Renal insufficiency    Joint pain    Gastritis    Polymyalgia rheumatica syndrome (HCC)    Primary osteoarthritis of both knees    Hypothyroidism    Exudative age-related macular degeneration (HCC)    Atrial fibrillation (HCC)    Chronic combined systolic and diastolic congestive heart failure (HCC)    Arthritis of right knee    Septic shock ([address]    Alcoholism in remission ([address]    Acute kidney injury ([address]     Plan      Antibiotics as ordered per ID     Continue rocephin  Follow renal function, cultures   Follow intake and output  Cardiac diet  hepatobilliary consulted .  Plan for parecentesis and liver biopsy on 9/2  Maintaining mean arterial pressure at 65 for now  Consults for evaluation of possible liver mets/cirrhosis            Attending Physician Statement  I have reviewed the chart, including any radiology or labs, and seen the patient with the resident(s). I personally reviewed and performed key elements of the history and exam.  I agree with the assessment, plan and orders as documented by the resident. Please refer to the resident note for additional information. Julianna March.  Olman

## 2019-08-31 NOTE — PLAN OF CARE
Problem: Falls - Risk of:  Goal: Will remain free from falls  Description  Will remain free from falls  8/31/2019 6834 by Akilah Melchor RN  Outcome: Met This Shift     Problem: Falls - Risk of:  Goal: Absence of physical injury  Description  Absence of physical injury  8/31/2019 9506 by Akilah Melchor RN  Outcome: Met This Shift     Problem: Risk for Impaired Skin Integrity  Goal: Tissue integrity - skin and mucous membranes  Description  Structural intactness and normal physiological function of skin and  mucous membranes.   8/31/2019 0491 by Akilah Melchor RN  Outcome: Met This Shift     Problem: Cardiac Output - Decreased:  Goal: Hemodynamic stability will improve  Description  Hemodynamic stability will improve  8/31/2019 2912 by Akilah Melchor RN  Outcome: Met This Shift     Problem: Infection, Septic Shock:  Goal: Will show no infection signs and symptoms  Description  Will show no infection signs and symptoms  8/31/2019 8383 by Akilah Melchor RN  Outcome: Met This Shift     Problem: Tissue Perfusion, Altered:  Goal: Circulatory function within specified parameters  Description  Circulatory function within specified parameters  8/31/2019 0633 by Akilah Melchor RN  Outcome: Met This Shift     Problem: Venous Thromboembolism:  Goal: Will show no signs or symptoms of venous thromboembolism  Description  Will show no signs or symptoms of venous thromboembolism  8/31/2019 5669 by Akilah Melchor RN  Outcome: Met This Shift     Problem: Venous Thromboembolism:  Goal: Absence of signs or symptoms of impaired coagulation  Description  Absence of signs or symptoms of impaired coagulation  8/31/2019 3817 by Akilah Melchor RN  Outcome: Met This Shift     Problem: Musculor/Skeletal Functional Status  Goal: Absence of falls  8/31/2019 4206 by Akilah Melchor RN  Outcome: Met This Shift

## 2019-08-31 NOTE — PLAN OF CARE
Problem: Falls - Risk of:  Goal: Will remain free from falls  Description  Will remain free from falls  8/31/2019 1725 by Ceci Jack RN  Outcome: Met This Shift     Problem: Risk for Impaired Skin Integrity  Goal: Tissue integrity - skin and mucous membranes  Description  Structural intactness and normal physiological function of skin and  mucous membranes.   8/31/2019 1725 by Ceci Jack RN  Outcome: Met This Shift     Problem: Cardiac Output - Decreased:  Goal: Hemodynamic stability will improve  Description  Hemodynamic stability will improve  8/31/2019 1725 by Ceci Jack RN  Outcome: Met This Shift     Problem: Tissue Perfusion, Altered:  Goal: Circulatory function within specified parameters  Description  Circulatory function within specified parameters  8/31/2019 1725 by Ceci Jack RN  Outcome: Met This Shift     Problem: Musculor/Skeletal Functional Status  Goal: Highest potential functional level  Outcome: Met This Shift

## 2019-08-31 NOTE — PROGRESS NOTES
Collette Rouse. SUBJECTIVE:  Admitted with fatigue , abd. Distention, d.o.e , diarrhea   No bm today so far   No new complaints. OBJECTIVE:    BP (!) 122/57   Pulse 74   Temp 97.5 °F (36.4 °C) (Temporal)   Resp 18   Ht 5' 8\" (1.727 m)   Wt 174 lb 3.2 oz (79 kg)   SpO2 96%   BMI 26.49 kg/m²   PHYSICAL:  GENERAL:  Alert, orient x 3, in no acute distress. HEENT:  No icterus  LYMPH:  No lymphadenopathy. HEART:  Regular rate and rhythm. No murmurs. LUNGS:  Clear to auscultation. ABDOMEN:  Soft. Non-tender. No mass / ascites  EXTREMITIES:  Warm,dry. Edema legs same  NEURO:  No focal deficits.            CBC with Differential:    Lab Results   Component Value Date    WBC 9.4 08/31/2019    RBC 3.09 08/31/2019    HGB 9.6 08/31/2019    HCT 28.1 08/31/2019     08/31/2019    MCV 90.0 08/31/2019    MCH 31.1 08/31/2019    MCHC 34.5 08/31/2019    RDW 14.9 08/31/2019    LYMPHOPCT 5.7 08/31/2019    MONOPCT 5.0 08/31/2019    BASOPCT 0.1 08/31/2019    MONOSABS 0.47 08/31/2019    LYMPHSABS 0.53 08/31/2019    EOSABS 0.00 08/31/2019    BASOSABS 0.01 08/31/2019        CMP:    Lab Results   Component Value Date     08/31/2019    K 3.1 08/31/2019     08/31/2019    CO2 21 08/31/2019    BUN 76 08/31/2019    CREATININE 1.7 08/31/2019    GFRAA 47 08/31/2019    LABGLOM 39 08/31/2019    GLUCOSE 196 08/31/2019    GLUCOSE 110 05/30/2012    PROT 5.2 08/31/2019    LABALBU 3.7 08/31/2019    LABALBU 4.2 05/30/2012    CALCIUM 7.9 08/31/2019    BILITOT 1.8 08/31/2019    ALKPHOS 38 08/31/2019    AST 11 08/31/2019    ALT 19 08/31/2019     LDH:    Lab Results   Component Value Date     08/27/2019     PT/INR:    Lab Results   Component Value Date    PROTIME 21.3 08/31/2019    PROTIME 24.7 07/24/2019    INR 1.9 08/31/2019     PTT:    Lab Results   Component Value Date    APTT 31.7 08/27/2019   [APTT  VITAMIN B12: No components found for: B12  FOLATE:  No results found for: FOLATE  IRON:    Lab Results

## 2019-08-31 NOTE — PROGRESS NOTES
[facility] PROGRESS NOTE      Chief complaint: Follow-up of E coli bacteremia and bacterial peritonitis    The patient is a 82 y.o. male with history of atrial fibrillation, CKD, DM, hypertension, hyperlipidemia, presented on 8/27 with 1 week history of worsening abdominal distention later accompanied by 1-2 episodes of soft stools and confusion, found to be in septic shock with E coli (non-ESBL, non-MDR) bacteremia and CT of the abdomen and pelvis showing cirrhosis, multiple lesions in the liver suggestive of metastases, moderate ascites, cholelithiasis, bilateral infiltrates at lung bases suggestive of atelectasis. He underwent paracentesis yielding peritoneal fluid with PMNs of 558. Peritoneal fluid Gram stain and culture showed moderate leukocytes, no epithelial cells, no organisms, no growth. Peritoneal fluid cytology showed no malignant cells. Hepatitis panel was unremarkable. Urinalysis showed no pyuria with urine culture showing no growth. Cefepime for 1 dose was given then switched to ceftriaxone and metronidazole. Metronidazole was discontinued on 08/30. Repeat paracentesis on 08/30 yielded 4950 mL of clear dark yellow fluid with decreased PMNs of 94. Subjective: Patient was seen and examined. No chills, no abdominal pain, no diarrhea, no rash, no itching. Objective:  BP (!) 104/56   Pulse 53   Temp 97.1 °F (36.2 °C) (Temporal)   Resp 18   Ht 5' 8\" (1.727 m)   Wt 174 lb 3.2 oz (79 kg)   SpO2 94%   BMI 26.49 kg/m²   Constitutional: Alert, not in distress  Respiratory: Clear breath sounds, no crackles, no wheezes  Cardiovascular: Regular rate and rhythm, no murmurs  Gastrointestinal: Abdomen distended, bowel sounds present, soft, nontender  Skin: Warm and dry, no active dermatoses  Musculoskeletal: No joint swelling, no joint erythema    Labs, imaging, and medical records/notes were personally reviewed.       Assessment:  E coli bacteremia, likely from spontaneous bacterial peritonitis  Septic shock, resolved    Recommendations:  Continue ceftriaxone at 2 g every 24 hours. Follow-up blood cultures. Follow up repeat paracentesis for 09/02 as recommended by General Surgery and send specimen for cell count with differential.  Consider GI consult for cirrhosis.     Thank you for involving me in the care of Loreleineelima Helenmundo. . I will continue to follow. Please do not hesitate to call for any questions or concerns.       Electronically signed by Avani Pride MD on 8/31/2019 at 8:48 AM

## 2019-09-01 LAB
ALBUMIN SERPL-MCNC: 3.5 G/DL (ref 3.5–5.2)
ALP BLD-CCNC: 39 U/L (ref 40–129)
ALT SERPL-CCNC: 18 U/L (ref 0–40)
ANION GAP SERPL CALCULATED.3IONS-SCNC: 11 MMOL/L (ref 7–16)
ANION GAP SERPL CALCULATED.3IONS-SCNC: 15 MMOL/L (ref 7–16)
AST SERPL-CCNC: 11 U/L (ref 0–39)
BASOPHILS ABSOLUTE: 0.01 E9/L (ref 0–0.2)
BASOPHILS RELATIVE PERCENT: 0.1 % (ref 0–2)
BILIRUB SERPL-MCNC: 1.7 MG/DL (ref 0–1.2)
BILIRUBIN DIRECT: 0.6 MG/DL (ref 0–0.3)
BILIRUBIN, INDIRECT: 1.1 MG/DL (ref 0–1)
BUN BLDV-MCNC: 54 MG/DL (ref 8–23)
BUN BLDV-MCNC: 59 MG/DL (ref 8–23)
CALCIUM IONIZED: 1.22 MMOL/L (ref 1.15–1.33)
CALCIUM SERPL-MCNC: 7.9 MG/DL (ref 8.6–10.2)
CALCIUM SERPL-MCNC: 8.4 MG/DL (ref 8.6–10.2)
CERULOPLASMIN: NORMAL
CHLORIDE BLD-SCNC: 108 MMOL/L (ref 98–107)
CHLORIDE BLD-SCNC: 112 MMOL/L (ref 98–107)
CO2: 23 MMOL/L (ref 22–29)
CO2: 24 MMOL/L (ref 22–29)
CREAT SERPL-MCNC: 1.5 MG/DL (ref 0.7–1.2)
CREAT SERPL-MCNC: 1.5 MG/DL (ref 0.7–1.2)
EOSINOPHILS ABSOLUTE: 0 E9/L (ref 0.05–0.5)
EOSINOPHILS RELATIVE PERCENT: 0 % (ref 0–6)
GFR AFRICAN AMERICAN: 54
GFR AFRICAN AMERICAN: 54
GFR NON-AFRICAN AMERICAN: 45 ML/MIN/1.73
GFR NON-AFRICAN AMERICAN: 45 ML/MIN/1.73
GLUCOSE BLD-MCNC: 175 MG/DL (ref 74–99)
GLUCOSE BLD-MCNC: 188 MG/DL (ref 74–99)
HCT VFR BLD CALC: 28.1 % (ref 37–54)
HCT VFR BLD CALC: 28.3 % (ref 37–54)
HCT VFR BLD CALC: 29.4 % (ref 37–54)
HCT VFR BLD CALC: 30 % (ref 37–54)
HEMOGLOBIN: 10.1 G/DL (ref 12.5–16.5)
HEMOGLOBIN: 9.4 G/DL (ref 12.5–16.5)
HEMOGLOBIN: 9.5 G/DL (ref 12.5–16.5)
HEMOGLOBIN: 9.9 G/DL (ref 12.5–16.5)
IMMATURE GRANULOCYTES #: 0.19 E9/L
IMMATURE GRANULOCYTES %: 1.7 % (ref 0–5)
INR BLD: 1.8
LACTATE DEHYDROGENASE: 162 U/L (ref 135–225)
LYMPHOCYTES ABSOLUTE: 0.62 E9/L (ref 1.5–4)
LYMPHOCYTES RELATIVE PERCENT: 5.7 % (ref 20–42)
MAGNESIUM: 2.3 MG/DL (ref 1.6–2.6)
MCH RBC QN AUTO: 30.6 PG (ref 26–35)
MCHC RBC AUTO-ENTMCNC: 33.5 % (ref 32–34.5)
MCV RBC AUTO: 91.5 FL (ref 80–99.9)
METER GLUCOSE: 150 MG/DL (ref 74–99)
METER GLUCOSE: 182 MG/DL (ref 74–99)
METER GLUCOSE: 183 MG/DL (ref 74–99)
METER GLUCOSE: 183 MG/DL (ref 74–99)
METER GLUCOSE: 194 MG/DL (ref 74–99)
METER GLUCOSE: 246 MG/DL (ref 74–99)
MONOCYTES ABSOLUTE: 0.63 E9/L (ref 0.1–0.95)
MONOCYTES RELATIVE PERCENT: 5.8 % (ref 2–12)
NEUTROPHILS ABSOLUTE: 9.46 E9/L (ref 1.8–7.3)
NEUTROPHILS RELATIVE PERCENT: 86.7 % (ref 43–80)
PDW BLD-RTO: 14.9 FL (ref 11.5–15)
PLATELET # BLD: 158 E9/L (ref 130–450)
PMV BLD AUTO: 11.4 FL (ref 7–12)
POTASSIUM REFLEX MAGNESIUM: 3.2 MMOL/L (ref 3.5–5)
POTASSIUM REFLEX MAGNESIUM: 3.9 MMOL/L (ref 3.5–5)
PROTHROMBIN TIME: 20 SEC (ref 9.3–12.4)
RBC # BLD: 3.07 E12/L (ref 3.8–5.8)
SODIUM BLD-SCNC: 146 MMOL/L (ref 132–146)
SODIUM BLD-SCNC: 147 MMOL/L (ref 132–146)
TOTAL PROTEIN: 4.9 G/DL (ref 6.4–8.3)
WBC # BLD: 10.9 E9/L (ref 4.5–11.5)

## 2019-09-01 PROCEDURE — 80048 BASIC METABOLIC PNL TOTAL CA: CPT

## 2019-09-01 PROCEDURE — 2580000003 HC RX 258: Performed by: INTERNAL MEDICINE

## 2019-09-01 PROCEDURE — 6370000000 HC RX 637 (ALT 250 FOR IP): Performed by: STUDENT IN AN ORGANIZED HEALTH CARE EDUCATION/TRAINING PROGRAM

## 2019-09-01 PROCEDURE — 85018 HEMOGLOBIN: CPT

## 2019-09-01 PROCEDURE — 6370000000 HC RX 637 (ALT 250 FOR IP): Performed by: INTERNAL MEDICINE

## 2019-09-01 PROCEDURE — 6360000002 HC RX W HCPCS: Performed by: INTERNAL MEDICINE

## 2019-09-01 PROCEDURE — 82962 GLUCOSE BLOOD TEST: CPT

## 2019-09-01 PROCEDURE — 80076 HEPATIC FUNCTION PANEL: CPT

## 2019-09-01 PROCEDURE — 6360000002 HC RX W HCPCS: Performed by: STUDENT IN AN ORGANIZED HEALTH CARE EDUCATION/TRAINING PROGRAM

## 2019-09-01 PROCEDURE — 85610 PROTHROMBIN TIME: CPT

## 2019-09-01 PROCEDURE — 99232 SBSQ HOSP IP/OBS MODERATE 35: CPT | Performed by: FAMILY MEDICINE

## 2019-09-01 PROCEDURE — 85025 COMPLETE CBC W/AUTO DIFF WBC: CPT

## 2019-09-01 PROCEDURE — 85014 HEMATOCRIT: CPT

## 2019-09-01 PROCEDURE — 36415 COLL VENOUS BLD VENIPUNCTURE: CPT

## 2019-09-01 PROCEDURE — 82330 ASSAY OF CALCIUM: CPT

## 2019-09-01 PROCEDURE — 83735 ASSAY OF MAGNESIUM: CPT

## 2019-09-01 PROCEDURE — 2060000000 HC ICU INTERMEDIATE R&B

## 2019-09-01 PROCEDURE — 83615 LACTATE (LD) (LDH) ENZYME: CPT

## 2019-09-01 RX ORDER — POTASSIUM CHLORIDE 7.45 MG/ML
10 INJECTION INTRAVENOUS ONCE
Status: COMPLETED | OUTPATIENT
Start: 2019-09-01 | End: 2019-09-01

## 2019-09-01 RX ORDER — POTASSIUM CHLORIDE 7.45 MG/ML
10 INJECTION INTRAVENOUS
Status: DISPENSED | OUTPATIENT
Start: 2019-09-01 | End: 2019-09-01

## 2019-09-01 RX ADMIN — POTASSIUM CHLORIDE 10 MEQ: 7.46 INJECTION, SOLUTION INTRAVENOUS at 12:20

## 2019-09-01 RX ADMIN — MIDODRINE HYDROCHLORIDE 10 MG: 5 TABLET ORAL at 09:20

## 2019-09-01 RX ADMIN — WATER 2 ML: 1 INJECTION INTRAMUSCULAR; INTRAVENOUS; SUBCUTANEOUS at 14:06

## 2019-09-01 RX ADMIN — Medication 10 ML: at 21:14

## 2019-09-01 RX ADMIN — WATER 2 ML: 1 INJECTION INTRAMUSCULAR; INTRAVENOUS; SUBCUTANEOUS at 01:30

## 2019-09-01 RX ADMIN — HYDROCORTISONE SODIUM SUCCINATE 50 MG: 100 INJECTION, POWDER, FOR SOLUTION INTRAMUSCULAR; INTRAVENOUS at 01:28

## 2019-09-01 RX ADMIN — CEFTRIAXONE SODIUM 2 G: 2 INJECTION, POWDER, FOR SOLUTION INTRAMUSCULAR; INTRAVENOUS at 12:18

## 2019-09-01 RX ADMIN — INSULIN LISPRO 1 UNITS: 100 INJECTION, SOLUTION INTRAVENOUS; SUBCUTANEOUS at 16:37

## 2019-09-01 RX ADMIN — HYDROCORTISONE SODIUM SUCCINATE 25 MG: 100 INJECTION, POWDER, FOR SOLUTION INTRAMUSCULAR; INTRAVENOUS at 12:19

## 2019-09-01 RX ADMIN — ATORVASTATIN CALCIUM 20 MG: 10 TABLET, FILM COATED ORAL at 21:14

## 2019-09-01 RX ADMIN — POTASSIUM CHLORIDE 10 MEQ: 7.46 INJECTION, SOLUTION INTRAVENOUS at 10:36

## 2019-09-01 RX ADMIN — PETROLATUM: 42 OINTMENT TOPICAL at 09:47

## 2019-09-01 RX ADMIN — INSULIN LISPRO 2 UNITS: 100 INJECTION, SOLUTION INTRAVENOUS; SUBCUTANEOUS at 21:30

## 2019-09-01 RX ADMIN — Medication 10 ML: at 09:21

## 2019-09-01 RX ADMIN — INSULIN LISPRO 1 UNITS: 100 INJECTION, SOLUTION INTRAVENOUS; SUBCUTANEOUS at 01:20

## 2019-09-01 RX ADMIN — INSULIN LISPRO 1 UNITS: 100 INJECTION, SOLUTION INTRAVENOUS; SUBCUTANEOUS at 05:24

## 2019-09-01 RX ADMIN — BUMETANIDE 2 MG: 1 TABLET ORAL at 09:45

## 2019-09-01 RX ADMIN — INSULIN LISPRO 1 UNITS: 100 INJECTION, SOLUTION INTRAVENOUS; SUBCUTANEOUS at 09:20

## 2019-09-01 RX ADMIN — POTASSIUM CHLORIDE 40 MEQ: 20 TABLET, EXTENDED RELEASE ORAL at 09:20

## 2019-09-01 ASSESSMENT — PAIN SCALES - GENERAL
PAINLEVEL_OUTOF10: 0

## 2019-09-01 NOTE — PROGRESS NOTES
[facility] PROGRESS NOTE      Chief complaint: Follow-up of E coli bacteremia and bacterial peritonitis    The patient is a 82 y.o. male with history of atrial fibrillation, CKD, DM, hypertension, hyperlipidemia, presented on 8/27 with 1 week history of worsening abdominal distention later accompanied by 1-2 episodes of soft stools and confusion, found to be in septic shock with E coli (non-ESBL, non-MDR) bacteremia and CT of the abdomen and pelvis showing cirrhosis, multiple lesions in the liver suggestive of metastases, moderate ascites, cholelithiasis, bilateral infiltrates at lung bases suggestive of atelectasis. He underwent paracentesis yielding peritoneal fluid with PMNs of 558. Peritoneal fluid Gram stain and culture showed moderate leukocytes, no epithelial cells, no organisms, no growth. Peritoneal fluid cytology showed no malignant cells. Hepatitis panel was unremarkable. Urinalysis showed no pyuria with urine culture showing no growth. Cefepime for 1 dose was given then switched to ceftriaxone and metronidazole. Metronidazole was discontinued on 08/30. Repeat paracentesis on 08/30 yielded 4950 mL of clear dark yellow fluid with decreased PMNs of 94. Subjective: Patient was seen and examined. No chills, no abdominal pain, no diarrhea, no rash, no itching. Objective:  BP (!) 94/53   Pulse 65   Temp 98.2 °F (36.8 °C) (Temporal)   Resp 18   Ht 5' 8\" (1.727 m)   Wt 169 lb 6.4 oz (76.8 kg)   SpO2 95%   BMI 25.76 kg/m²   Constitutional: Alert, not in distress  Respiratory: Clear breath sounds, no crackles, no wheezes  Cardiovascular: Regular rate and rhythm, no murmurs  Gastrointestinal: Abdomen distended, bowel sounds present, soft, nontender  Skin: Warm and dry, no active dermatoses  Musculoskeletal: No joint swelling, no joint erythema    Labs, imaging, and medical records/notes were personally reviewed.       Assessment:  E coli bacteremia, likely from spontaneous bacterial peritonitis  Septic

## 2019-09-02 LAB
ALBUMIN SERPL-MCNC: 3.6 G/DL (ref 3.5–5.2)
ALP BLD-CCNC: 44 U/L (ref 40–129)
ALT SERPL-CCNC: 18 U/L (ref 0–40)
ANION GAP SERPL CALCULATED.3IONS-SCNC: 12 MMOL/L (ref 7–16)
AST SERPL-CCNC: 11 U/L (ref 0–39)
BASOPHILS ABSOLUTE: 0.02 E9/L (ref 0–0.2)
BASOPHILS RELATIVE PERCENT: 0.2 % (ref 0–2)
BILIRUB SERPL-MCNC: 1.6 MG/DL (ref 0–1.2)
BILIRUBIN DIRECT: 0.6 MG/DL (ref 0–0.3)
BILIRUBIN, INDIRECT: 1 MG/DL (ref 0–1)
BLOOD BANK DISPENSE STATUS: NORMAL
BLOOD BANK DISPENSE STATUS: NORMAL
BLOOD BANK PRODUCT CODE: NORMAL
BLOOD BANK PRODUCT CODE: NORMAL
BPU ID: NORMAL
BPU ID: NORMAL
BUN BLDV-MCNC: 49 MG/DL (ref 8–23)
CALCIUM SERPL-MCNC: 8.4 MG/DL (ref 8.6–10.2)
CHLORIDE BLD-SCNC: 109 MMOL/L (ref 98–107)
CO2: 25 MMOL/L (ref 22–29)
CREAT SERPL-MCNC: 1.5 MG/DL (ref 0.7–1.2)
DESCRIPTION BLOOD BANK: NORMAL
DESCRIPTION BLOOD BANK: NORMAL
EOSINOPHILS ABSOLUTE: 0.01 E9/L (ref 0.05–0.5)
EOSINOPHILS RELATIVE PERCENT: 0.1 % (ref 0–6)
GFR AFRICAN AMERICAN: 54
GFR NON-AFRICAN AMERICAN: 45 ML/MIN/1.73
GLUCOSE BLD-MCNC: 199 MG/DL (ref 74–99)
HCT VFR BLD CALC: 27.7 % (ref 37–54)
HEMOGLOBIN: 9.4 G/DL (ref 12.5–16.5)
IMMATURE GRANULOCYTES #: 0.39 E9/L
IMMATURE GRANULOCYTES %: 3 % (ref 0–5)
INR BLD: 1.6
LV EF: 26 %
LVEF MODALITY: NORMAL
LYMPHOCYTES ABSOLUTE: 0.82 E9/L (ref 1.5–4)
LYMPHOCYTES RELATIVE PERCENT: 6.4 % (ref 20–42)
MAGNESIUM: 2.1 MG/DL (ref 1.6–2.6)
MCH RBC QN AUTO: 30.9 PG (ref 26–35)
MCHC RBC AUTO-ENTMCNC: 33.9 % (ref 32–34.5)
MCV RBC AUTO: 91.1 FL (ref 80–99.9)
METER GLUCOSE: 160 MG/DL (ref 74–99)
METER GLUCOSE: 165 MG/DL (ref 74–99)
METER GLUCOSE: 188 MG/DL (ref 74–99)
METER GLUCOSE: 193 MG/DL (ref 74–99)
MONOCYTES ABSOLUTE: 0.84 E9/L (ref 0.1–0.95)
MONOCYTES RELATIVE PERCENT: 6.5 % (ref 2–12)
NEUTROPHILS ABSOLUTE: 10.81 E9/L (ref 1.8–7.3)
NEUTROPHILS RELATIVE PERCENT: 83.8 % (ref 43–80)
PDW BLD-RTO: 15 FL (ref 11.5–15)
PLATELET # BLD: 182 E9/L (ref 130–450)
PMV BLD AUTO: 10.9 FL (ref 7–12)
POTASSIUM REFLEX MAGNESIUM: 3.2 MMOL/L (ref 3.5–5)
PROCALCITONIN: 2.11 NG/ML (ref 0–0.08)
PROTHROMBIN TIME: 17.9 SEC (ref 9.3–12.4)
RBC # BLD: 3.04 E12/L (ref 3.8–5.8)
SODIUM BLD-SCNC: 146 MMOL/L (ref 132–146)
TOTAL PROTEIN: 5.4 G/DL (ref 6.4–8.3)
WBC # BLD: 12.9 E9/L (ref 4.5–11.5)

## 2019-09-02 PROCEDURE — 2580000003 HC RX 258: Performed by: INTERNAL MEDICINE

## 2019-09-02 PROCEDURE — 6360000002 HC RX W HCPCS: Performed by: STUDENT IN AN ORGANIZED HEALTH CARE EDUCATION/TRAINING PROGRAM

## 2019-09-02 PROCEDURE — 6360000002 HC RX W HCPCS: Performed by: INTERNAL MEDICINE

## 2019-09-02 PROCEDURE — 93306 TTE W/DOPPLER COMPLETE: CPT

## 2019-09-02 PROCEDURE — 85025 COMPLETE CBC W/AUTO DIFF WBC: CPT

## 2019-09-02 PROCEDURE — 6370000000 HC RX 637 (ALT 250 FOR IP): Performed by: INTERNAL MEDICINE

## 2019-09-02 PROCEDURE — 84145 PROCALCITONIN (PCT): CPT

## 2019-09-02 PROCEDURE — 83735 ASSAY OF MAGNESIUM: CPT

## 2019-09-02 PROCEDURE — 80048 BASIC METABOLIC PNL TOTAL CA: CPT

## 2019-09-02 PROCEDURE — 99232 SBSQ HOSP IP/OBS MODERATE 35: CPT | Performed by: FAMILY MEDICINE

## 2019-09-02 PROCEDURE — 85610 PROTHROMBIN TIME: CPT

## 2019-09-02 PROCEDURE — 82962 GLUCOSE BLOOD TEST: CPT

## 2019-09-02 PROCEDURE — 99232 SBSQ HOSP IP/OBS MODERATE 35: CPT | Performed by: SURGERY

## 2019-09-02 PROCEDURE — 2060000000 HC ICU INTERMEDIATE R&B

## 2019-09-02 PROCEDURE — 36415 COLL VENOUS BLD VENIPUNCTURE: CPT

## 2019-09-02 PROCEDURE — 80076 HEPATIC FUNCTION PANEL: CPT

## 2019-09-02 PROCEDURE — 6370000000 HC RX 637 (ALT 250 FOR IP): Performed by: STUDENT IN AN ORGANIZED HEALTH CARE EDUCATION/TRAINING PROGRAM

## 2019-09-02 RX ORDER — POTASSIUM CHLORIDE 7.45 MG/ML
10 INJECTION INTRAVENOUS
Status: COMPLETED | OUTPATIENT
Start: 2019-09-02 | End: 2019-09-02

## 2019-09-02 RX ADMIN — BUMETANIDE 2 MG: 1 TABLET ORAL at 08:16

## 2019-09-02 RX ADMIN — INSULIN LISPRO 1 UNITS: 100 INJECTION, SOLUTION INTRAVENOUS; SUBCUTANEOUS at 16:25

## 2019-09-02 RX ADMIN — INSULIN LISPRO 1 UNITS: 100 INJECTION, SOLUTION INTRAVENOUS; SUBCUTANEOUS at 20:22

## 2019-09-02 RX ADMIN — Medication 10 ML: at 08:16

## 2019-09-02 RX ADMIN — POTASSIUM CHLORIDE 40 MEQ: 20 TABLET, EXTENDED RELEASE ORAL at 08:16

## 2019-09-02 RX ADMIN — WATER 2 ML: 1 INJECTION INTRAMUSCULAR; INTRAVENOUS; SUBCUTANEOUS at 14:03

## 2019-09-02 RX ADMIN — POTASSIUM CHLORIDE 10 MEQ: 7.46 INJECTION, SOLUTION INTRAVENOUS at 10:31

## 2019-09-02 RX ADMIN — ATORVASTATIN CALCIUM 20 MG: 10 TABLET, FILM COATED ORAL at 20:10

## 2019-09-02 RX ADMIN — Medication 10 ML: at 20:13

## 2019-09-02 RX ADMIN — MIDODRINE HYDROCHLORIDE 10 MG: 5 TABLET ORAL at 08:16

## 2019-09-02 RX ADMIN — POTASSIUM CHLORIDE 10 MEQ: 7.46 INJECTION, SOLUTION INTRAVENOUS at 09:02

## 2019-09-02 RX ADMIN — HYDROCORTISONE SODIUM SUCCINATE 25 MG: 100 INJECTION, POWDER, FOR SOLUTION INTRAMUSCULAR; INTRAVENOUS at 14:04

## 2019-09-02 RX ADMIN — HYDROCORTISONE SODIUM SUCCINATE 25 MG: 100 INJECTION, POWDER, FOR SOLUTION INTRAMUSCULAR; INTRAVENOUS at 02:25

## 2019-09-02 RX ADMIN — WATER 2 ML: 1 INJECTION INTRAMUSCULAR; INTRAVENOUS; SUBCUTANEOUS at 02:25

## 2019-09-02 RX ADMIN — CEFTRIAXONE SODIUM 2 G: 2 INJECTION, POWDER, FOR SOLUTION INTRAMUSCULAR; INTRAVENOUS at 11:37

## 2019-09-02 ASSESSMENT — PAIN SCALES - GENERAL
PAINLEVEL_OUTOF10: 0

## 2019-09-02 NOTE — PROGRESS NOTES
146 09/02/2019    K 3.2 09/02/2019     09/02/2019    CO2 25 09/02/2019    BUN 49 09/02/2019    CREATININE 1.5 09/02/2019    GFRAA 54 09/02/2019    LABGLOM 45 09/02/2019    GLUCOSE 199 09/02/2019    GLUCOSE 110 05/30/2012    PROT 5.4 09/02/2019    LABALBU 3.6 09/02/2019    LABALBU 4.2 05/30/2012    CALCIUM 8.4 09/02/2019    BILITOT 1.6 09/02/2019    ALKPHOS 44 09/02/2019    AST 11 09/02/2019    ALT 18 09/02/2019     Magnesium:    Lab Results   Component Value Date    MG 2.1 09/02/2019     Phosphorus:    Lab Results   Component Value Date    PHOS 3.9 08/29/2019        Urine sodium: <20  Urine chloride: <20  Urine potassium: 75.6  Urine creatinine: 209   Urine UN: 723  FE Urea: 13.8%      Radiology Review:     Acute abdominal series and chest x-ray August 28, 2019   1. No radiographic evidence of large volume free intraperitoneal air. Please note that smaller volumes of free intraperitoneal air may or   may not be visible on abdominal radiographs. If there is persistent   clinical concern for free intraperitoneal air, CT scan the abdomen and   pelvis is recommended. 2. Stable, enlarged cardiac mediastinal silhouette with underlying   mild central pulmonary vascular congestion and thoracic aortic   vascular calcifications. 3. Nonspecific bibasilar airspace disease, findings can be seen in   infiltrate/pneumonia and/or atelectasis. Annamary Ripa 4. 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 5. Gaseous distended of the stomach in the midline. CT abdomen and pelvis without contrast August 27, 2019   There are bilateral infiltrates at the lung bases, likely related to   atelectasis     There is findings to suggest cirrhosis   Cholelithiasis   Ascites   Multiple lesions in the liver         Chest x-ray August 27, 2019   Mild CHF without edema.       Right IJ central line in the superior vena cava without complications.                 BRIEF SUMMARY OF INITIAL CONSULT:    Briefly Mr. Freya Milan is a 28-year-old

## 2019-09-02 NOTE — PROGRESS NOTES
Edwige Shrestha PA-C. Suzie Martinez MD, Radiologist, have reviewed the images and report and   concur with these findings. XR CHEST PORTABLE   Final Result   1. Stable, large cardiomediastinal 11 thoracic aortic vascular   calcification mild central pulmonary vascular congestion. 2. Suspected bibasilar atelectasis. US DUP LOWER EXTREMITIES BILATERAL VENOUS   Final Result   No evidence for deep venous thrombosis in the bilateral lower   extremities. XR Acute Abd Series Chest 1 VW   Final Result   1. No radiographic evidence of large volume free intraperitoneal air. Please note that smaller volumes of free intraperitoneal air may or   may not be visible on abdominal radiographs. If there is persistent   clinical concern for free intraperitoneal air, CT scan the abdomen and   pelvis is recommended. 2. Stable, enlarged cardiac mediastinal silhouette with underlying   mild central pulmonary vascular congestion and thoracic aortic   vascular calcifications. 3. Nonspecific bibasilar airspace disease, findings can be seen in   infiltrate/pneumonia and/or atelectasis. [place]aks 4. 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 5. Gaseous distended of the stomach in the midline. XR CHEST PORTABLE   Final Result   Mild CHF without edema. Right IJ central line in the superior vena cava without complications. CT ABDOMEN PELVIS WO CONTRAST   Final Result   There are bilateral infiltrates at the lung bases, likely related to   atelectasis     There is findings to suggest cirrhosis   Cholelithiasis   Ascites   Multiple lesions in the liver                           XR CHEST PORTABLE   Final Result   Cardiomegaly   Findings compatible with atherosclerotic disease of the aorta.    No acute infiltrate    There is a poor inspiratory effort                       US DUP ABD PEL RETRO SCROT COMPLETE    (Results Pending)   IR BIOPSY LIVER PERCUTANEOUS    (Results Pending)   IR US

## 2019-09-03 ENCOUNTER — APPOINTMENT (OUTPATIENT)
Dept: INTERVENTIONAL RADIOLOGY/VASCULAR | Age: 84
DRG: 871 | End: 2019-09-03
Payer: MEDICARE

## 2019-09-03 ENCOUNTER — APPOINTMENT (OUTPATIENT)
Dept: CT IMAGING | Age: 84
DRG: 871 | End: 2019-09-03
Payer: MEDICARE

## 2019-09-03 PROBLEM — E44.0 MODERATE PROTEIN-CALORIE MALNUTRITION (HCC): Chronic | Status: ACTIVE | Noted: 2019-09-03

## 2019-09-03 LAB
ALBUMIN SERPL-MCNC: 3.3 G/DL (ref 3.5–4.7)
ALBUMIN SERPL-MCNC: 3.4 G/DL (ref 3.5–5.2)
ALP BLD-CCNC: 44 U/L (ref 40–129)
ALPHA-1-GLOBULIN: 0.2 G/DL (ref 0.2–0.4)
ALPHA-2-GLOBULIN: 0.6 G/FL (ref 0.5–1)
ALT SERPL-CCNC: 19 U/L (ref 0–40)
ANION GAP SERPL CALCULATED.3IONS-SCNC: 12 MMOL/L (ref 7–16)
ANION GAP SERPL CALCULATED.3IONS-SCNC: 13 MMOL/L (ref 7–16)
APPEARANCE FLUID: NORMAL
AST SERPL-CCNC: 12 U/L (ref 0–39)
BASOPHILS ABSOLUTE: 0.01 E9/L (ref 0–0.2)
BASOPHILS RELATIVE PERCENT: 0.1 % (ref 0–2)
BETA GLOBULIN: 0.4 G/DL (ref 0.8–1.3)
BILIRUB SERPL-MCNC: 1.6 MG/DL (ref 0–1.2)
BILIRUBIN DIRECT: 0.6 MG/DL (ref 0–0.3)
BILIRUBIN, INDIRECT: 1 MG/DL (ref 0–1)
BUN BLDV-MCNC: 40 MG/DL (ref 8–23)
BUN BLDV-MCNC: 40 MG/DL (ref 8–23)
CALCIUM SERPL-MCNC: 8.4 MG/DL (ref 8.6–10.2)
CALCIUM SERPL-MCNC: 8.7 MG/DL (ref 8.6–10.2)
CELL COUNT FLUID TYPE: NORMAL
CHLORIDE BLD-SCNC: 107 MMOL/L (ref 98–107)
CHLORIDE BLD-SCNC: 107 MMOL/L (ref 98–107)
CO2: 27 MMOL/L (ref 22–29)
CO2: 28 MMOL/L (ref 22–29)
COLOR FLUID: YELLOW
CREAT SERPL-MCNC: 1.3 MG/DL (ref 0.7–1.2)
CREAT SERPL-MCNC: 1.4 MG/DL (ref 0.7–1.2)
ELECTROPHORESIS: ABNORMAL
EOSINOPHILS ABSOLUTE: 0.01 E9/L (ref 0.05–0.5)
EOSINOPHILS RELATIVE PERCENT: 0.1 % (ref 0–6)
GAMMA GLOBULIN: 0.6 G/DL (ref 0.7–1.6)
GFR AFRICAN AMERICAN: 59
GFR AFRICAN AMERICAN: >60
GFR NON-AFRICAN AMERICAN: 49 ML/MIN/1.73
GFR NON-AFRICAN AMERICAN: 53 ML/MIN/1.73
GLUCOSE BLD-MCNC: 178 MG/DL (ref 74–99)
GLUCOSE BLD-MCNC: 191 MG/DL (ref 74–99)
HCT VFR BLD CALC: 28.3 % (ref 37–54)
HEMOGLOBIN: 9.3 G/DL (ref 12.5–16.5)
IGA: 105 MG/DL (ref 70–400)
IGG: 481 MG/DL (ref 700–1600)
IGM: 36 MG/DL (ref 40–230)
IMMATURE GRANULOCYTES #: 0.41 E9/L
IMMATURE GRANULOCYTES %: 3.3 % (ref 0–5)
INR BLD: 1.5
LYMPHOCYTES ABSOLUTE: 0.77 E9/L (ref 1.5–4)
LYMPHOCYTES RELATIVE PERCENT: 6.2 % (ref 20–42)
MAGNESIUM: 1.9 MG/DL (ref 1.6–2.6)
MCH RBC QN AUTO: 30.5 PG (ref 26–35)
MCHC RBC AUTO-ENTMCNC: 32.9 % (ref 32–34.5)
MCV RBC AUTO: 92.8 FL (ref 80–99.9)
METER GLUCOSE: 133 MG/DL (ref 74–99)
METER GLUCOSE: 140 MG/DL (ref 74–99)
METER GLUCOSE: 183 MG/DL (ref 74–99)
METER GLUCOSE: 183 MG/DL (ref 74–99)
MONOCYTE, FLUID: 85 %
MONOCYTES ABSOLUTE: 0.76 E9/L (ref 0.1–0.95)
MONOCYTES RELATIVE PERCENT: 6.1 % (ref 2–12)
NEUTROPHIL, FLUID: 15 %
NEUTROPHILS ABSOLUTE: 10.49 E9/L (ref 1.8–7.3)
NEUTROPHILS RELATIVE PERCENT: 84.2 % (ref 43–80)
NUCLEATED CELLS FLUID: 269 /UL
PATHOLOGIST REVIEW: NORMAL
PDW BLD-RTO: 15.2 FL (ref 11.5–15)
PLATELET # BLD: 181 E9/L (ref 130–450)
PMV BLD AUTO: 11 FL (ref 7–12)
POTASSIUM REFLEX MAGNESIUM: 3.4 MMOL/L (ref 3.5–5)
POTASSIUM SERPL-SCNC: 3.4 MMOL/L (ref 3.5–5)
PROTHROMBIN TIME: 17.2 SEC (ref 9.3–12.4)
RBC # BLD: 3.05 E12/L (ref 3.8–5.8)
RBC FLUID: 7000 /UL
SODIUM BLD-SCNC: 147 MMOL/L (ref 132–146)
SODIUM BLD-SCNC: 147 MMOL/L (ref 132–146)
TOTAL PROTEIN: 5.1 G/DL (ref 6.4–8.3)
WBC # BLD: 12.5 E9/L (ref 4.5–11.5)

## 2019-09-03 PROCEDURE — 82962 GLUCOSE BLOOD TEST: CPT

## 2019-09-03 PROCEDURE — 2580000003 HC RX 258: Performed by: INTERNAL MEDICINE

## 2019-09-03 PROCEDURE — 99232 SBSQ HOSP IP/OBS MODERATE 35: CPT | Performed by: FAMILY MEDICINE

## 2019-09-03 PROCEDURE — 6370000000 HC RX 637 (ALT 250 FOR IP): Performed by: STUDENT IN AN ORGANIZED HEALTH CARE EDUCATION/TRAINING PROGRAM

## 2019-09-03 PROCEDURE — 85025 COMPLETE CBC W/AUTO DIFF WBC: CPT

## 2019-09-03 PROCEDURE — 2500000003 HC RX 250 WO HCPCS: Performed by: PHYSICIAN ASSISTANT

## 2019-09-03 PROCEDURE — 2060000000 HC ICU INTERMEDIATE R&B

## 2019-09-03 PROCEDURE — 89051 BODY FLUID CELL COUNT: CPT

## 2019-09-03 PROCEDURE — 0W9G3ZZ DRAINAGE OF PERITONEAL CAVITY, PERCUTANEOUS APPROACH: ICD-10-PCS | Performed by: RADIOLOGY

## 2019-09-03 PROCEDURE — 83735 ASSAY OF MAGNESIUM: CPT

## 2019-09-03 PROCEDURE — 80048 BASIC METABOLIC PNL TOTAL CA: CPT

## 2019-09-03 PROCEDURE — 6370000000 HC RX 637 (ALT 250 FOR IP): Performed by: INTERNAL MEDICINE

## 2019-09-03 PROCEDURE — 49083 ABD PARACENTESIS W/IMAGING: CPT

## 2019-09-03 PROCEDURE — 80076 HEPATIC FUNCTION PANEL: CPT

## 2019-09-03 PROCEDURE — 36415 COLL VENOUS BLD VENIPUNCTURE: CPT

## 2019-09-03 PROCEDURE — 99232 SBSQ HOSP IP/OBS MODERATE 35: CPT | Performed by: INTERNAL MEDICINE

## 2019-09-03 PROCEDURE — 2500000003 HC RX 250 WO HCPCS: Performed by: RADIOLOGY

## 2019-09-03 PROCEDURE — 88307 TISSUE EXAM BY PATHOLOGIST: CPT

## 2019-09-03 PROCEDURE — 2700000000 HC OXYGEN THERAPY PER DAY

## 2019-09-03 PROCEDURE — 77012 CT SCAN FOR NEEDLE BIOPSY: CPT

## 2019-09-03 PROCEDURE — 2709999900 CT NEEDLE BIOPSY LIVER PERCUTANEOUS

## 2019-09-03 PROCEDURE — 6360000002 HC RX W HCPCS: Performed by: INTERNAL MEDICINE

## 2019-09-03 PROCEDURE — 6360000002 HC RX W HCPCS: Performed by: STUDENT IN AN ORGANIZED HEALTH CARE EDUCATION/TRAINING PROGRAM

## 2019-09-03 PROCEDURE — 85610 PROTHROMBIN TIME: CPT

## 2019-09-03 PROCEDURE — 6360000002 HC RX W HCPCS: Performed by: RADIOLOGY

## 2019-09-03 PROCEDURE — 6370000000 HC RX 637 (ALT 250 FOR IP): Performed by: RADIOLOGY

## 2019-09-03 PROCEDURE — 36592 COLLECT BLOOD FROM PICC: CPT

## 2019-09-03 PROCEDURE — 6360000004 HC RX CONTRAST MEDICATION: Performed by: RADIOLOGY

## 2019-09-03 PROCEDURE — C1729 CATH, DRAINAGE: HCPCS

## 2019-09-03 PROCEDURE — 88313 SPECIAL STAINS GROUP 2: CPT

## 2019-09-03 RX ORDER — LIDOCAINE HYDROCHLORIDE 20 MG/ML
10 INJECTION, SOLUTION INFILTRATION; PERINEURAL ONCE
Status: COMPLETED | OUTPATIENT
Start: 2019-09-03 | End: 2019-09-03

## 2019-09-03 RX ORDER — FENTANYL CITRATE 50 UG/ML
50 INJECTION, SOLUTION INTRAMUSCULAR; INTRAVENOUS ONCE
Status: COMPLETED | OUTPATIENT
Start: 2019-09-03 | End: 2019-09-03

## 2019-09-03 RX ORDER — MIDAZOLAM HYDROCHLORIDE 1 MG/ML
1 INJECTION INTRAMUSCULAR; INTRAVENOUS ONCE
Status: COMPLETED | OUTPATIENT
Start: 2019-09-03 | End: 2019-09-03

## 2019-09-03 RX ORDER — LIDOCAINE HYDROCHLORIDE 20 MG/ML
4 INJECTION, SOLUTION INFILTRATION; PERINEURAL ONCE
Status: COMPLETED | OUTPATIENT
Start: 2019-09-03 | End: 2019-09-03

## 2019-09-03 RX ADMIN — BUMETANIDE 2 MG: 1 TABLET ORAL at 12:24

## 2019-09-03 RX ADMIN — WATER 2 ML: 1 INJECTION INTRAMUSCULAR; INTRAVENOUS; SUBCUTANEOUS at 01:31

## 2019-09-03 RX ADMIN — HYDROCORTISONE SODIUM SUCCINATE 25 MG: 100 INJECTION, POWDER, FOR SOLUTION INTRAMUSCULAR; INTRAVENOUS at 01:31

## 2019-09-03 RX ADMIN — MIDAZOLAM 1 MG: 1 INJECTION INTRAMUSCULAR; INTRAVENOUS at 10:30

## 2019-09-03 RX ADMIN — CEFTRIAXONE SODIUM 2 G: 2 INJECTION, POWDER, FOR SOLUTION INTRAMUSCULAR; INTRAVENOUS at 13:28

## 2019-09-03 RX ADMIN — HYDROCORTISONE SODIUM SUCCINATE 25 MG: 100 INJECTION, POWDER, FOR SOLUTION INTRAMUSCULAR; INTRAVENOUS at 13:34

## 2019-09-03 RX ADMIN — LIDOCAINE HYDROCHLORIDE 10 ML: 20 INJECTION, SOLUTION EPIDURAL; INFILTRATION; INTRACAUDAL; PERINEURAL at 10:34

## 2019-09-03 RX ADMIN — INSULIN LISPRO 1 UNITS: 100 INJECTION, SOLUTION INTRAVENOUS; SUBCUTANEOUS at 17:16

## 2019-09-03 RX ADMIN — Medication 10 ML: at 12:24

## 2019-09-03 RX ADMIN — ATORVASTATIN CALCIUM 20 MG: 10 TABLET, FILM COATED ORAL at 20:08

## 2019-09-03 RX ADMIN — Medication: at 10:37

## 2019-09-03 RX ADMIN — FENTANYL CITRATE 50 MCG: 50 INJECTION INTRAMUSCULAR; INTRAVENOUS at 10:30

## 2019-09-03 RX ADMIN — POTASSIUM CHLORIDE 40 MEQ: 20 TABLET, EXTENDED RELEASE ORAL at 12:23

## 2019-09-03 RX ADMIN — WATER 2 ML: 1 INJECTION INTRAMUSCULAR; INTRAVENOUS; SUBCUTANEOUS at 13:35

## 2019-09-03 RX ADMIN — INSULIN LISPRO 1 UNITS: 100 INJECTION, SOLUTION INTRAVENOUS; SUBCUTANEOUS at 20:10

## 2019-09-03 RX ADMIN — IOPAMIDOL 1 ML: 612 INJECTION, SOLUTION INTRAVENOUS at 10:37

## 2019-09-03 RX ADMIN — LIDOCAINE HYDROCHLORIDE 4 ML: 20 INJECTION, SOLUTION EPIDURAL; INFILTRATION; INTRACAUDAL; PERINEURAL at 11:19

## 2019-09-03 ASSESSMENT — PAIN SCALES - GENERAL
PAINLEVEL_OUTOF10: 0

## 2019-09-03 ASSESSMENT — PAIN - FUNCTIONAL ASSESSMENT: PAIN_FUNCTIONAL_ASSESSMENT: 0-10

## 2019-09-03 NOTE — BRIEF OP NOTE
Brief Postoperative Note  ______________________________________________________________      PARACENTESIS BRIEF PROCEDURE NOTE    Patient Name: Blanca Aguiar Medical Record Number: [id number]  Date: 9/3/19   Time: 1:58 PM   Room/Bed: [address] [place] [address]    Pre-operative Diagnosis: Ascites    Post-operative Diagnosis: Ascites    H and P reviewed prior to the procedure without change. Sonographic images were saved and stored in PACS. See radiology dictation in PACs for image review and additional procedural information. Performed by: BRANDY Duggan under indirect supervision by Alvester Skiff, MD.    Procedure: Prior to start of procedure, routine scanning of all four abdominal quadrants was performed using real-time ultrasound and revealed sufficient amount of ascites fluid present. Decision was made to proceed with procedure. After obtaining consent, the patient was placed in the supine position with the head of the bed slightly elevated and the appropriate landmarks were identified. The skin over the puncture site in the right lower quadrant region was prepped with betadine and draped in a sterile fashion. Local anesthesia was obtained by infiltration using 2% Lidocaine without epinephrine. A paracentesis catheter was then advanced into the abdominal cavity over a needle and the needle was withdrawn. Fluid return was dark yellow colored. A total volume of 4950 ml was withdrawn and was processed in accordance with the ordering provider(s) requests (see Epic Orders--> Labs for laboratory order details). The catheter was then withdrawn and a sterile dressing was placed over the site. The patient tolerated the procedure well. Complications: None. Estimated Blood Loss: < 10 cc.         Electronically signed by BRANDY Duggan   DD: 9/3/19  1:58 PM

## 2019-09-03 NOTE — PROCEDURES
Abdomen scanned, prepped and centesis catheter inserted LLQ with ultrasound guidance by Odell Moya @ 11:18a . Patient tolerated well. 2.6 Liters drained of trupti colored ascites fluid. Centesis catheter removed @ 11:35a   . Puncture site cleansed and dry dressing applied. No bleeding, swelling or complications noted.

## 2019-09-03 NOTE — H&P
Interventional Radiology  PRE-OPERATIVE H&P FOR PARACENTESIS    DIAGNOSIS:    Patient Active Problem List   Diagnosis    CAD (coronary artery disease)    [facility]-fib ([facility]    HTN (hypertension)    Type 2 diabetes mellitus with complication (HCC)    Hyperlipidemia    Vitamin D deficiency    Renal insufficiency    Joint pain    Gastritis    Polymyalgia rheumatica syndrome ([facility]    Primary osteoarthritis of both knees    Hypothyroidism    Exudative age-related macular degeneration ([facility]    Atrial fibrillation (HCC)    Chronic combined systolic and diastolic congestive heart failure (HCC)    Arthritis of right knee    Septic shock ([facility]    [facility]    Acute kidney injury ([facility]    Other ascites    Moderate protein-calorie malnutrition (HCC)       CHIEF COMPLAINT: Abdominal Ascites      Current Facility-Administered Medications:     insulin lispro (HUMALOG) injection vial 0-6 Units, 0-6 Units, Subcutaneous, 4x Daily AC & HS, Yosef Levy MD, 1 Units at 09/02/19 2022    perflutren lipid microspheres (DEFINITY) injection 1.65 mg, 1.5 mL, Intravenous, ONCE PRN, Cheyenne Mars MD    hydrocortisone sodium succinate PF (SOLU-CORTEF) injection 25 mg, 25 mg, Intravenous, Q12H, Yosef Levy MD, 25 mg at 09/03/19 1334    potassium chloride (KLOR-CON M) extended release tablet 40 mEq, 40 mEq, Oral, Daily with breakfast, Yosef Levy MD, 40 mEq at 09/03/19 1223    bumetanide (BUMEX) tablet 2 mg, 2 mg, Oral, Daily, Brad Gurrola MD, 2 mg at 09/03/19 1224    sterile water injection 2 mL, 2 mL, Injection, Q12H, Luke Barnard MD, 2 mL at 09/03/19 1335    mineral oil-hydrophilic petrolatum (HYDROPHOR) ointment, , Topical, BID PRN, Luke Barnard MD    atorvastatin (LIPITOR) tablet 20 mg, 20 mg, Oral, Daily, Luke Barnard MD, 20 mg at 09/02/19 2010    cefTRIAXone (ROCEPHIN) 2 g in sterile water 20 mL IV syringe, 2 g, Intravenous, Q24H, Luke Barnard MD, 2 g at 09/03/19 09/03/2019    LYMPHSABS 0.77 09/03/2019    EOSABS 0.01 09/03/2019    BASOSABS 0.01 09/03/2019     Platelets:    Lab Results   Component Value Date     09/03/2019     BUN/Creatinine:    Lab Results   Component Value Date    BUN 40 09/03/2019    CREATININE 1.4 09/03/2019       ASSESSMENT AND PLAN:  1. Abdominal Ascites  2. Procedure options, risks and benefits reviewed with patient. Patient expresses understanding. 3.   Diagnostic and Therapeutic Ascites Paracentesis under ultrasound guidance.     Electronically signed by BRANDY Taylor   DD: 9/3/19  1:58 PM

## 2019-09-03 NOTE — PROGRESS NOTES
Component Value Date    IRON 23 08/28/2019     Iron Saturation:  No components found for: PERCENTFE  TIBC:    Lab Results   Component Value Date    TIBC 115 08/28/2019     FERRITIN:    Lab Results   Component Value Date    FERRITIN 469 08/28/2019     LUIS:    Lab Results   Component Value Date    LUIS NEGATIVE 07/01/2015     ASSESSMENT AND PLAN:  1. There is report of multiple lesions in the liver, but by the CT scan  which was done without intravenous contrast, no splenomegaly but there  was finding of moderate ascites. Probable cirrhosis The cytology from the ascites is  negative for malignant cells. repeat paracentesis / Liver biopsy done today. Dr. Kristian Gardner evaluating . CT scan of the chest - no signs of neoplasm  ultrasound examination of the liver - noted. serum CA-19-9 normal , AFP . are pending. The CEA is normal.     2. Moderate anemia  of recent  onset. Normal  LDH ,   Ferritin ,  Iron saturation , PSA , SPE , no blasts on blood smear.   UPE pending.     Apparently, he has not had a colonoscopy in the past.     Will follow

## 2019-09-03 NOTE — PROGRESS NOTES
[facility] - [facility] Inpatient   Resident Progress Note    S:  Hospital day: 7   Brief Synopsis: Star Jamil is a 82 y.o. male who presented with SBP septic shock. Patient examined at bedside this morning and denies fever, chills, chest pain, SOB, HA, and dizziness. Patient would like to be up and ambulating. Overnight/interim:    No acute problems overnight. Cont meds:    dextrose       Scheduled meds:    insulin lispro  0-6 Units Subcutaneous 4x Daily AC & HS    hydrocortisone sodium succinate PF  25 mg Intravenous Q12H    potassium chloride  40 mEq Oral Daily with breakfast    bumetanide  2 mg Oral Daily    sterile water  2 mL Injection Q12H    atorvastatin  20 mg Oral Daily    cefTRIAXone (ROCEPHIN) IV  2 g Intravenous Q24H    sodium chloride flush  10 mL Intravenous 2 times per day     PRN meds: perflutren lipid microspheres, mineral oil-hydrophilic petrolatum, sodium chloride flush, magnesium hydroxide, ondansetron, glucose, dextrose, glucagon (rDNA), dextrose     I reviewed the patient's past medical and surgical history, Medications and Allergies. O:  BP (!) 115/57   Pulse 84   Temp 97.8 °F (36.6 °C) (Temporal)   Resp 20   Ht 5' 8\" (1.727 m)   Wt 162 lb 6.4 oz (73.7 kg)   SpO2 96%   BMI 24.69 kg/m²   24 hour I&O: I/O last 3 completed shifts: In: 36 [P.O.:720; I.V.:10]  Out: 725 [Urine:725]  No intake/output data recorded. GENERAL: Alert, cooperative, no acute distress. HEENT: Normocephalic, atraumatic. Conjunctiva/corneas clear, EOM's intact, no pallor or icterus. CHEST: No tenderness or deformity, full & symmetric excursion  LUNG: Clear to auscultation bilaterally,  respirations unlabored. No rales/wheezing/rubs  HEART: RRR, S1 and S2 normal, no murmur, rub or gallop. DP pulses 2/4  ABDOMEN: No masses, no organomegaly, no guarding, rebound or rigidity. Mildly distended, no fluid wave. Non-tender to palpation.   GENITOURINARY: Urinary cath not present SKIN: Skin color slightly jaundiced; texture, turgor normal, no rashes or lesions  NEUROLOGIC: Alert & Oriented      Labs:  Na/K/Cl/CO2:  147/3.4/107/27 (09/03 0615)  BUN/Cr/glu/ALT/AST/amyl/lip:  40/1.4/--/19/12/--/-- (09/03 0615)  WBC/Hgb/Hct/Plts:  12.5/9.3/28.3/181 (09/03 0615)  estimated creatinine clearance is 39 mL/min (A) (based on SCr of 1.4 mg/dL (H)). Other pertinent labs as noted below    Radiology:  IR US GUIDED PARACENTESIS   Final Result   Successful paracentesis. This procedure was performed by Macario Nash PA-C under the   indirect supervision of Connie Queen MD.      The exam has been dictated and signed by Macario Nash PA-C. Itzel Cameron MD, Radiologist, have reviewed the images and   report and concur with these findings. CT NEEDLE BIOPSY LIVER PERCUTANEOUS   Final Result   Successful uncomplicated CT and fluoroscopic guided liver   biopsy. If there are any physician concerns regarding this report, a   Radiologist can be reached by calling the following number [phone]. CT GUIDED NEEDLE PLACEMENT   Final Result   Successful uncomplicated CT and fluoroscopic guided liver   biopsy. If there are any physician concerns regarding this report, a   Radiologist can be reached by calling the following number [phone]. US ABDOMEN LIMITED   Final Result   1. Signs for liver cirrhosis. 2. Could not conspicuous identify focal lesion in the liver, this is   seen in the peripheral aspect of the right lobe was difficult to be   accessible by ultrasound. 3. Well distended gallbladder with thickening of the wall, with   multiple stones and sludge. See above comments and recommendations. 4. No dilatation of the intrahepatic biliary tree. Borderline diameter   for the common bile duct.       5. Patent vascular system for the liver, hepatopedal flow in the   portal venous system, no signs for obstruction of the arterial, portal venous and hepatic venous system of the liver. CT CHEST WO CONTRAST   Final Result   Cardiomegaly with the coronary artery calcification and   atelectasis/pleural effusion in the lung bases likely mild CHF. Heterogeneous liver with nonspecific low-attenuation lesions and small   amount of ascites. Distended gallbladder with  gallstones. If cholecystitis is clinically   suspected, consider hepatobiliary scan. IR US GUIDED PARACENTESIS   Final Result   Successful paracentesis. .         This procedure was performed by Ruthy Davis PA-C under the indirect   supervision of Lord Cece MD  who was not present for the procedure. .      The exam has been dictated and signed by Ruthy Davis PA-C. Charlette Martinez MD, Radiologist, have reviewed the images and report and   concur with these findings. XR CHEST PORTABLE   Final Result   1. Stable, large cardiomediastinal 11 thoracic aortic vascular   calcification mild central pulmonary vascular congestion. 2. Suspected bibasilar atelectasis. US DUP LOWER EXTREMITIES BILATERAL VENOUS   Final Result   No evidence for deep venous thrombosis in the bilateral lower   extremities. XR Acute Abd Series Chest 1 VW   Final Result   1. No radiographic evidence of large volume free intraperitoneal air. Please note that smaller volumes of free intraperitoneal air may or   may not be visible on abdominal radiographs. If there is persistent   clinical concern for free intraperitoneal air, CT scan the abdomen and   pelvis is recommended. 2. Stable, enlarged cardiac mediastinal silhouette with underlying   mild central pulmonary vascular congestion and thoracic aortic   vascular calcifications. 3. Nonspecific bibasilar airspace disease, findings can be seen in   infiltrate/pneumonia and/or atelectasis. Artist Hazy 4. 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 5. Gaseous distended of the stomach in the midline.       XR

## 2019-09-03 NOTE — PLAN OF CARE
Problem: Malnutrition  (NI-5.2)  Goal: Food and/or Nutrient Delivery  Description-Monitor PO intakes and need for ONS  Individualized approach for food/nutrient provision.   Outcome: Met This Shift

## 2019-09-03 NOTE — PROGRESS NOTES
place.  Musculoskeletal: No joint swelling, no joint erythema    Labs, imaging, and medical records/notes were personally reviewed. Assessment:  E coli bacteremia, likely from spontaneous bacterial peritonitis  Septic shock, resolved    Recommendations:  Continue ceftriaxone at 2 g every 24 hours. Plan to switch to oral antibiotic on discharge to finish 14 days of antibiotic therapy.     Thank you for involving me in the care of Paola Alva. . I will continue to follow. Please do not hesitate to call for any questions or concerns.       Electronically signed by Shu Whitlock MD on 9/3/2019 at 9:47 AM

## 2019-09-03 NOTE — PROGRESS NOTES
IRFLOWSHEET    Date: 9/3/2019    Time: 1010    Exam: image guided liver biopsy    Radiologist Performing Procedure: Dr. Melly Leyva     Nurse Receiving: Sebas Gaffney signed:      Yes    ID Band Checklist: Yes    Allergies: Patient has no known allergies. TIME OUT: 0163    PROCEDURE START TIME: 8414    Puncture Site: liver    Puncture Time: 1036    LABS:   Lab Results   Component Value Date    INR 1.5 09/03/2019    PROTIME 17.2 (H) 09/03/2019           Lab Results   Component Value Date    CREATININE 1.4 (H) 09/03/2019    BUN 40 (H) 09/03/2019          Lab Results   Component Value Date    HGB 9.3 (L) 09/03/2019    HCT 28.3 (L) 09/03/2019     09/03/2019         PROCEDURE END TIME: 1038    Total Contrast: 1 ml    Fluoroscopy Time: 92.6 seconds    Complications:  None    Comments: Image guided liver bx completed. Vitals stable. Pt. Tolerated well. 3 total cores obtained. Gelfoam inserted in bx site by Dr. Melly Leyva. Dry sterile dressing applied. Pt. Taken to angio holding area for paracentesis.

## 2019-09-03 NOTE — PROGRESS NOTES
proteinuria, probably due to nephrosclerosis, HTN, type II DM, CAD status post stenting, ICD placement, AF on anticoagulation with warfarin, HFrEF 30-35%, hyperlipidemia, hypothyroidism, alcohol abuse, who was admitted on August 27, 2019 after he presented to the ER with complaints of abdominal pain and diarrhea. On admission his creatinine was 2 mg/dL, despite IV fluids administration his creatinine has continued to increase up to 2.5 g/dL reason for this consultation. His medications prior to admission included furosemide, Entresto. A CT scan of the abdomen obtained on admission demonstrated presence of ascites and findings consistent with cirrhosis as well as multiple hepatic lesions probably metastatic disease. He underwent a large volume paracentesis of 5.5 L. Problems resolved:    · Hemodynamic shock probably septic, on pressors    IMPRESSION/RECOMMENDATIONS:      1. HELENA on CKD, probably volume responsive prerenal HELENA, urine Na>20, FE Urea 13.8%, due to recent diarrhea along with use of diuretic, and recent large paracentesis. · Renal function slowly improving with excellent diuresis. 2. CKD stage III, without proteinuria, probably due to nephrosclerosis  3. Hypernatremia with hypervolemia,2/2 decompensated heart failure and cirrhosis, continue natriuresis  4. Hypokalemia, secondary to diuretics  5. Spontaneous bacterial peritonitis, on ceftriaxone  6. HFrEF 30-35%, most recent echo with EF of 55%  7. Cirrhosis; probably cardiac cirrhosis?, Hepatitis serology negative, CEA 0.9, s/p biopsy   8. Ascites status post large volume paracentesis x2, 5.5 L and 4.9 L  9.  Probably liver metastatic disease??  10. Paroxysmal atrial fibrillation, anticoagulation on hold    Plan:    · Replace potassium  · Continue midodrine 10 mg 3 times daily  · Continue Bumex 2 mg po daily   · Continue KCl 40 mEq daily  · Continue to monitor renal function  · Monitor sodium levels

## 2019-09-04 VITALS
BODY MASS INDEX: 24.26 KG/M2 | RESPIRATION RATE: 14 BRPM | OXYGEN SATURATION: 96 % | HEART RATE: 86 BPM | DIASTOLIC BLOOD PRESSURE: 56 MMHG | SYSTOLIC BLOOD PRESSURE: 128 MMHG | WEIGHT: 160.1 LBS | HEIGHT: 68 IN | TEMPERATURE: 97.7 F

## 2019-09-04 PROBLEM — N17.9 ACUTE KIDNEY INJURY (HCC): Status: RESOLVED | Noted: 2019-08-28 | Resolved: 2019-09-04

## 2019-09-04 PROBLEM — A41.9 SEPTIC SHOCK (HCC): Status: RESOLVED | Noted: 2019-08-27 | Resolved: 2019-09-04

## 2019-09-04 PROBLEM — R65.21 SEPTIC SHOCK (HCC): Status: RESOLVED | Noted: 2019-08-27 | Resolved: 2019-09-04

## 2019-09-04 LAB
ALBUMIN SERPL-MCNC: 3.4 G/DL (ref 3.5–5.2)
ALP BLD-CCNC: 44 U/L (ref 40–129)
ALT SERPL-CCNC: 19 U/L (ref 0–40)
ANION GAP SERPL CALCULATED.3IONS-SCNC: 11 MMOL/L (ref 7–16)
ANION GAP SERPL CALCULATED.3IONS-SCNC: 13 MMOL/L (ref 7–16)
AST SERPL-CCNC: 16 U/L (ref 0–39)
BASOPHILS ABSOLUTE: 0.01 E9/L (ref 0–0.2)
BASOPHILS RELATIVE PERCENT: 0.1 % (ref 0–2)
BILIRUB SERPL-MCNC: 1.6 MG/DL (ref 0–1.2)
BILIRUBIN DIRECT: 0.5 MG/DL (ref 0–0.3)
BILIRUBIN, INDIRECT: 1.1 MG/DL (ref 0–1)
BUN BLDV-MCNC: 35 MG/DL (ref 8–23)
BUN BLDV-MCNC: 35 MG/DL (ref 8–23)
CALCIUM SERPL-MCNC: 8.1 MG/DL (ref 8.6–10.2)
CALCIUM SERPL-MCNC: 8.4 MG/DL (ref 8.6–10.2)
CHLORIDE BLD-SCNC: 101 MMOL/L (ref 98–107)
CHLORIDE BLD-SCNC: 106 MMOL/L (ref 98–107)
CO2: 28 MMOL/L (ref 22–29)
CO2: 29 MMOL/L (ref 22–29)
COPPER /G CREAT: 52.1 UG/G CRT (ref 10–45)
COPPER URINE: 6.2 UG/DL (ref 0.3–3.2)
COPPER, 24H UR: ABNORMAL UG/D (ref 3–45)
CREAT SERPL-MCNC: 1.3 MG/DL (ref 0.7–1.2)
CREAT SERPL-MCNC: 1.3 MG/DL (ref 0.7–1.2)
CREATININE 24 HOUR URINE: ABNORMAL MG/D (ref 600–2000)
CREATININE URINE: 119 MG/DL
EOSINOPHILS ABSOLUTE: 0.09 E9/L (ref 0.05–0.5)
EOSINOPHILS RELATIVE PERCENT: 0.8 % (ref 0–6)
GFR AFRICAN AMERICAN: >60
GFR AFRICAN AMERICAN: >60
GFR NON-AFRICAN AMERICAN: 53 ML/MIN/1.73
GFR NON-AFRICAN AMERICAN: 53 ML/MIN/1.73
GLUCOSE BLD-MCNC: 138 MG/DL (ref 74–99)
GLUCOSE BLD-MCNC: 209 MG/DL (ref 74–99)
HCT VFR BLD CALC: 29.7 % (ref 37–54)
HEMOGLOBIN: 9.9 G/DL (ref 12.5–16.5)
HOURS COLLECTED: ABNORMAL HR
IMMATURE GRANULOCYTES #: 0.33 E9/L
IMMATURE GRANULOCYTES %: 2.9 % (ref 0–5)
INR BLD: 1.5
LYMPHOCYTES ABSOLUTE: 0.74 E9/L (ref 1.5–4)
LYMPHOCYTES RELATIVE PERCENT: 6.4 % (ref 20–42)
MAGNESIUM: 1.8 MG/DL (ref 1.6–2.6)
MCH RBC QN AUTO: 30.8 PG (ref 26–35)
MCHC RBC AUTO-ENTMCNC: 33.3 % (ref 32–34.5)
MCV RBC AUTO: 92.5 FL (ref 80–99.9)
METER GLUCOSE: 138 MG/DL (ref 74–99)
METER GLUCOSE: 147 MG/DL (ref 74–99)
METER GLUCOSE: 160 MG/DL (ref 74–99)
MONOCYTES ABSOLUTE: 0.84 E9/L (ref 0.1–0.95)
MONOCYTES RELATIVE PERCENT: 7.3 % (ref 2–12)
NEUTROPHILS ABSOLUTE: 9.49 E9/L (ref 1.8–7.3)
NEUTROPHILS RELATIVE PERCENT: 82.5 % (ref 43–80)
PDW BLD-RTO: 15.7 FL (ref 11.5–15)
PLATELET # BLD: 172 E9/L (ref 130–450)
PMV BLD AUTO: 10.8 FL (ref 7–12)
POTASSIUM REFLEX MAGNESIUM: 3.1 MMOL/L (ref 3.5–5)
POTASSIUM SERPL-SCNC: 3.8 MMOL/L (ref 3.5–5)
PROCALCITONIN: 0.55 NG/ML (ref 0–0.08)
PROTHROMBIN TIME: 17.5 SEC (ref 9.3–12.4)
RBC # BLD: 3.21 E12/L (ref 3.8–5.8)
SODIUM BLD-SCNC: 143 MMOL/L (ref 132–146)
SODIUM BLD-SCNC: 145 MMOL/L (ref 132–146)
TOTAL PROTEIN: 4.9 G/DL (ref 6.4–8.3)
URINE TOTAL VOLUME: ABNORMAL ML
WBC # BLD: 11.5 E9/L (ref 4.5–11.5)

## 2019-09-04 PROCEDURE — 6370000000 HC RX 637 (ALT 250 FOR IP): Performed by: INTERNAL MEDICINE

## 2019-09-04 PROCEDURE — 83735 ASSAY OF MAGNESIUM: CPT

## 2019-09-04 PROCEDURE — 84145 PROCALCITONIN (PCT): CPT

## 2019-09-04 PROCEDURE — 97530 THERAPEUTIC ACTIVITIES: CPT

## 2019-09-04 PROCEDURE — 80076 HEPATIC FUNCTION PANEL: CPT

## 2019-09-04 PROCEDURE — 85610 PROTHROMBIN TIME: CPT

## 2019-09-04 PROCEDURE — 6360000002 HC RX W HCPCS: Performed by: STUDENT IN AN ORGANIZED HEALTH CARE EDUCATION/TRAINING PROGRAM

## 2019-09-04 PROCEDURE — 82962 GLUCOSE BLOOD TEST: CPT

## 2019-09-04 PROCEDURE — 6370000000 HC RX 637 (ALT 250 FOR IP): Performed by: STUDENT IN AN ORGANIZED HEALTH CARE EDUCATION/TRAINING PROGRAM

## 2019-09-04 PROCEDURE — 86334 IMMUNOFIX E-PHORESIS SERUM: CPT

## 2019-09-04 PROCEDURE — 2580000003 HC RX 258: Performed by: INTERNAL MEDICINE

## 2019-09-04 PROCEDURE — 85025 COMPLETE CBC W/AUTO DIFF WBC: CPT

## 2019-09-04 PROCEDURE — 99239 HOSP IP/OBS DSCHRG MGMT >30: CPT | Performed by: FAMILY MEDICINE

## 2019-09-04 PROCEDURE — 6370000000 HC RX 637 (ALT 250 FOR IP): Performed by: FAMILY MEDICINE

## 2019-09-04 PROCEDURE — 80048 BASIC METABOLIC PNL TOTAL CA: CPT

## 2019-09-04 PROCEDURE — 84166 PROTEIN E-PHORESIS/URINE/CSF: CPT

## 2019-09-04 PROCEDURE — 36592 COLLECT BLOOD FROM PICC: CPT

## 2019-09-04 PROCEDURE — 6360000002 HC RX W HCPCS: Performed by: INTERNAL MEDICINE

## 2019-09-04 PROCEDURE — 36415 COLL VENOUS BLD VENIPUNCTURE: CPT

## 2019-09-04 RX ORDER — BUMETANIDE 2 MG/1
2 TABLET ORAL DAILY
Qty: 30 TABLET | Refills: 3 | Status: ON HOLD | OUTPATIENT
Start: 2019-09-05 | End: 2019-09-26 | Stop reason: HOSPADM

## 2019-09-04 RX ORDER — POTASSIUM CHLORIDE 20 MEQ/1
40 TABLET, EXTENDED RELEASE ORAL DAILY
Qty: 60 TABLET | Refills: 3 | Status: ON HOLD | OUTPATIENT
Start: 2019-09-04 | End: 2019-09-26 | Stop reason: HOSPADM

## 2019-09-04 RX ORDER — WARFARIN SODIUM 5 MG/1
5 TABLET ORAL
Status: COMPLETED | OUTPATIENT
Start: 2019-09-04 | End: 2019-09-04

## 2019-09-04 RX ORDER — POTASSIUM CHLORIDE 20 MEQ/1
40 TABLET, EXTENDED RELEASE ORAL 2 TIMES DAILY WITH MEALS
Status: DISCONTINUED | OUTPATIENT
Start: 2019-09-04 | End: 2019-09-04 | Stop reason: HOSPADM

## 2019-09-04 RX ADMIN — INSULIN LISPRO 1 UNITS: 100 INJECTION, SOLUTION INTRAVENOUS; SUBCUTANEOUS at 12:00

## 2019-09-04 RX ADMIN — Medication 10 ML: at 13:35

## 2019-09-04 RX ADMIN — BUMETANIDE 2 MG: 1 TABLET ORAL at 08:32

## 2019-09-04 RX ADMIN — WATER 2 ML: 1 INJECTION INTRAMUSCULAR; INTRAVENOUS; SUBCUTANEOUS at 13:42

## 2019-09-04 RX ADMIN — POTASSIUM CHLORIDE 40 MEQ: 20 TABLET, EXTENDED RELEASE ORAL at 08:32

## 2019-09-04 RX ADMIN — POTASSIUM CHLORIDE 40 MEQ: 20 TABLET, EXTENDED RELEASE ORAL at 18:11

## 2019-09-04 RX ADMIN — HYDROCORTISONE SODIUM SUCCINATE 25 MG: 100 INJECTION, POWDER, FOR SOLUTION INTRAMUSCULAR; INTRAVENOUS at 13:42

## 2019-09-04 RX ADMIN — WARFARIN SODIUM 5 MG: 5 TABLET ORAL at 18:11

## 2019-09-04 RX ADMIN — Medication 10 ML: at 13:49

## 2019-09-04 RX ADMIN — WATER 2 ML: 1 INJECTION INTRAMUSCULAR; INTRAVENOUS; SUBCUTANEOUS at 03:34

## 2019-09-04 RX ADMIN — CEFTRIAXONE SODIUM 2 G: 2 INJECTION, POWDER, FOR SOLUTION INTRAMUSCULAR; INTRAVENOUS at 13:35

## 2019-09-04 RX ADMIN — HYDROCORTISONE SODIUM SUCCINATE 25 MG: 100 INJECTION, POWDER, FOR SOLUTION INTRAMUSCULAR; INTRAVENOUS at 03:25

## 2019-09-04 RX ADMIN — Medication 10 ML: at 08:32

## 2019-09-04 ASSESSMENT — PAIN SCALES - GENERAL: PAINLEVEL_OUTOF10: 0

## 2019-09-04 NOTE — PROGRESS NOTES
Patient received 40 mEq of PO K this morning. Potassium 3.1 this morning. Notified ROSIE of K results. Also checked to see when patient should resume Coumadin.

## 2019-09-04 NOTE — DISCHARGE SUMMARY
Discharge Summary    Tiara Feng :  1935  MRN:  [id number]    Admit date:  2019  Discharge date:  19    Admitting Physician:  Abdul Eisenmenger, MD    Discharge Diagnoses:    Patient Active Problem List   Diagnosis    CAD (coronary artery disease)    [facility]    HTN (hypertension)    Type 2 diabetes mellitus with complication ([facility]    Hyperlipidemia    Vitamin D deficiency    Renal insufficiency    Joint pain    Gastritis    Polymyalgia rheumatica syndrome ([facility]    Primary osteoarthritis of both knees    Hypothyroidism    Exudative age-related macular degeneration ([facility]    Atrial fibrillation (HCC)    Chronic combined systolic and diastolic congestive heart failure ([facility]    Arthritis of right knee    Alcoholism in remission ([facility]    Moderate protein-calorie malnutrition ([facility]       Admission Condition:  poor    Discharged Condition:  good    Hospital Course:  Tiara Feng is a 82 y.o. male with PMH of CAD, A-fib, HTN, DM2, hyperlipidemia, CKD, gastritis, CHF, and alcoholsim presented to the ED with weakness, abdominal pain and distension, and diarhea. Workup revealed E coli in blood. Patient was admitted for septic shock with anasarca/ascites, treated with Recephin 2 g IV daily. ID, nephrology, GI, ICU, hematology, and general surgery were consulted and recommended SBP prophylaxis, liver bx, and holding coumadin and lasix. Patient remained stable, recovered and was in a suitable condition to be discharged to home. Discharge plan: Follow up with GI for out patient colonoscopy, follow up with Nephrology for hypokalemia and chronic kidney disease, follow up with hepatobiliary surgery for f/u bx.     Discharge Medications:         Medication List      START taking these medications    bumetanide 2 MG tablet  Commonly known as:  BUMEX  Take 1 tablet by mouth daily        CHANGE how you take these medications    potassium chloride 20 MEQ extended release tablet  Commonly known as:  KLOR-CON M  Take 2 tablets by mouth daily  What changed:  See the new instructions. CONTINUE taking these medications    atorvastatin 20 MG tablet  Commonly known as:  LIPITOR  Take 1 tablet by mouth daily     carvedilol 80 MG Cp24 extended release capsule  Commonly known as:  COREG CR  TAKE 1 CAPSULE DAILY     digoxin 125 MCG tablet  Commonly known as:  LANOXIN  TAKE 1 TABLET DAILY     ENTRESTO 49-51 MG per tablet  Generic drug:  sacubitril-valsartan  TAKE 1 TABLET TWICE A DAY     JANUVIA 100 MG tablet  Generic drug:  SITagliptin  TAKE 1 TABLET DAILY     ranolazine 500 MG extended release tablet  Commonly known as:  RANEXA  Take 1 tablet by mouth 2 times daily     vitamin D 1000 units Tabs tablet  Commonly known as:  CHOLECALCIFEROL  Take 1 tablet by mouth daily. * warfarin 5 MG tablet  Commonly known as:  COUMADIN  Take as directed. If you are unsure how to take this medication, talk to your nurse or doctor. * warfarin 7.5 MG tablet  Commonly known as:  COUMADIN  Take as directed. If you are unsure how to take this medication, talk to your nurse or doctor. * This list has 2 medication(s) that are the same as other medications prescribed for you. Read the directions carefully, and ask your doctor or other care provider to review them with you.             STOP taking these medications    furosemide 40 MG tablet  Commonly known as:  LASIX           Where to Get Your Medications      These medications were sent to [facility], [address] [place] [phone]  [facility], [address] [place] [address]    Phone:  [phone]   · bumetanide 2 MG tablet  · potassium chloride 20 MEQ extended release tablet         Consults:  pulmonary/intensive care, ID, GI, nephrology, hematology/oncology and general surgery    Significant Diagnostic Studies:  See below    Labs:  Na/K/Cl/CO2:  143/3.8/101/29 (09/04 1330)  BUN/Cr/glu/ALT/AST/amyl/lip:  35/1.3/--/--/--/--/-- (09/04 1330)  WBC/Hgb/Hct/Plts:  11.5/9.9/29.7/172 (09/04 0524)  [unfilled]  estimated creatinine clearance is 42 mL/min (A) (based on SCr of 1.3 mg/dL (H)). New Imaging:  IR US GUIDED PARACENTESIS   Final Result   Successful paracentesis. This procedure was performed by Tahir Sidhu PA-C under the   indirect supervision of Einar Cranker MD.      The exam has been dictated and signed by Tahir Sidhu PA-C. Asim Myers MD, Radiologist, have reviewed the images and   report and concur with these findings. CT NEEDLE BIOPSY LIVER PERCUTANEOUS   Final Result   Successful uncomplicated CT and fluoroscopic guided liver   biopsy. If there are any physician concerns regarding this report, a   Radiologist can be reached by calling the following number [phone]. CT GUIDED NEEDLE PLACEMENT   Final Result   Successful uncomplicated CT and fluoroscopic guided liver   biopsy. If there are any physician concerns regarding this report, a   Radiologist can be reached by calling the following number [phone]. US ABDOMEN LIMITED   Final Result   1. Signs for liver cirrhosis. 2. Could not conspicuous identify focal lesion in the liver, this is   seen in the peripheral aspect of the right lobe was difficult to be   accessible by ultrasound. 3. Well distended gallbladder with thickening of the wall, with   multiple stones and sludge. See above comments and recommendations. 4. No dilatation of the intrahepatic biliary tree. Borderline diameter   for the common bile duct. 5. Patent vascular system for the liver, hepatopedal flow in the   portal venous system, no signs for obstruction of the arterial, portal   venous and hepatic venous system of the liver. US DUP ABD PEL RETRO SCROT COMPLETE   Final Result   1. Signs for liver cirrhosis.       2. Could not conspicuous

## 2019-09-04 NOTE — PROGRESS NOTES
CHERELLE PROGRESS NOTE      Chief complaint: Follow-up of E coli bacteremia and bacterial peritonitis    The patient is a 82 y.o. male with history of atrial fibrillation, CKD, DM, hypertension, hyperlipidemia, presented on 8/27 with 1 week history of worsening abdominal distention later accompanied by 1-2 episodes of soft stools and confusion, found to be in septic shock with E coli (non-ESBL, non-MDR) bacteremia and CT of the abdomen and pelvis showing cirrhosis, multiple lesions in the liver suggestive of metastases, moderate ascites, cholelithiasis, bilateral infiltrates at lung bases suggestive of atelectasis. He underwent paracentesis yielding peritoneal fluid with PMNs of 558. Peritoneal fluid Gram stain and culture showed moderate leukocytes, no epithelial cells, no organisms, no growth. Peritoneal fluid cytology showed no malignant cells. Hepatitis panel was unremarkable. Urinalysis showed no pyuria with urine culture showing no growth. Cefepime for 1 dose was given then switched to ceftriaxone and metronidazole. Metronidazole was discontinued on 08/30. Repeat paracentesis on 08/30 yielded 4950 mL of clear dark yellow fluid with decreased PMNs of 94. He underwent liver biopsy and repeat paracentesis on 09/03 during which 2600 mL of peritoneal fluid was removed with cell count and differential showing further decreased PMNs at 40. Subjective: Patient was seen and examined. No chills, no abdominal pain, no diarrhea, no rash, no itching. Objective:  BP (!) 128/56   Pulse 86   Temp 97.7 °F (36.5 °C) (Temporal)   Resp 14   Ht 5' 8\" (1.727 m)   Wt 160 lb 1.6 oz (72.6 kg)   SpO2 96%   BMI 24.34 kg/m²   Constitutional: Alert, not in distress  Respiratory: Clear breath sounds, no crackles, no wheezes  Cardiovascular: Regular rate and rhythm, no murmurs  Gastrointestinal: Abdomen less distended, bowel sounds present, soft, nontender  Skin: Warm and dry, no active dermatoses.  Right IJ TLC in

## 2019-09-04 NOTE — PROGRESS NOTES
disease  Monitor wbc  +fobt on admission, for outpatient follow up colonoscopy  Anticoagulation to be restarted today  Follow up copper results outpatient  Electrolyte repletion  Anticipate discharge to home today. Attending Physician Statement  I have reviewed the chart, including any radiology or labs, and seen the patient with the resident(s). I personally reviewed and performed key elements of the history and exam.  I agree with the assessment, plan and orders as documented by the resident. Please refer to the resident note for additional information.       Pablo Carrasco

## 2019-09-04 NOTE — PROGRESS NOTES
09/04/2019    CREATININE 1.3 09/04/2019    GFRAA >60 09/04/2019    LABGLOM 53 09/04/2019    GLUCOSE 138 09/04/2019    GLUCOSE 110 05/30/2012    PROT 4.9 09/04/2019    LABALBU 3.4 09/04/2019    LABALBU 4.2 05/30/2012    CALCIUM 8.1 09/04/2019    BILITOT 1.6 09/04/2019    ALKPHOS 44 09/04/2019    AST 16 09/04/2019    ALT 19 09/04/2019     Magnesium:    Lab Results   Component Value Date    MG 1.8 09/04/2019     Phosphorus:    Lab Results   Component Value Date    PHOS 3.9 08/29/2019        Urine sodium: <20  Urine chloride: <20  Urine potassium: 75.6  Urine creatinine: 209   Urine UN: 723  FE Urea: 13.8%      Radiology Review:     Acute abdominal series and chest x-ray August 28, 2019   1. No radiographic evidence of large volume free intraperitoneal air. Please note that smaller volumes of free intraperitoneal air may or   may not be visible on abdominal radiographs. If there is persistent   clinical concern for free intraperitoneal air, CT scan the abdomen and   pelvis is recommended. 2. Stable, enlarged cardiac mediastinal silhouette with underlying   mild central pulmonary vascular congestion and thoracic aortic   vascular calcifications. 3. Nonspecific bibasilar airspace disease, findings can be seen in   infiltrate/pneumonia and/or atelectasis. Estanislado Lobstein 4. 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 5. Gaseous distended of the stomach in the midline. CT abdomen and pelvis without contrast August 27, 2019   There are bilateral infiltrates at the lung bases, likely related to   atelectasis     There is findings to suggest cirrhosis   Cholelithiasis   Ascites   Multiple lesions in the liver         Chest x-ray August 27, 2019   Mild CHF without edema.       Right IJ central line in the superior vena cava without complications.                 BRIEF SUMMARY OF INITIAL CONSULT:    Briefly Mr. Faiza Guerra is a 82-year-old man with history of CKD stage III, baseline creatinine 1.3 mg/dL, without proteinuria, probably due to nephrosclerosis, HTN, type II DM, CAD status post stenting, ICD placement, AF on anticoagulation with warfarin, HFrEF 30-35%, hyperlipidemia, hypothyroidism, alcohol abuse, who was admitted on August 27, 2019 after he presented to the ER with complaints of abdominal pain and diarrhea. On admission his creatinine was 2 mg/dL, despite IV fluids administration his creatinine has continued to increase up to 2.5 g/dL reason for this consultation. His medications prior to admission included furosemide, Entresto. A CT scan of the abdomen obtained on admission demonstrated presence of ascites and findings consistent with cirrhosis as well as multiple hepatic lesions probably metastatic disease. He underwent a large volume paracentesis of 5.5 L. Problems resolved:    · Hemodynamic shock probably septic, on pressors    IMPRESSION/RECOMMENDATIONS:      1. HELENA on CKD, probably volume responsive prerenal HELENA, urine Na>20, FE Urea 13.8%, due to recent diarrhea along with use of diuretic, and recent large paracentesis. · Renal function slowly improving with excellent diuresis. 2. CKD stage III, without proteinuria, probably due to nephrosclerosis  3. Hypernatremia with hypervolemia,2/2 decompensated heart failure and cirrhosis, continue natriuresis  4. Hypokalemia, secondary to diuretics  5. Spontaneous bacterial peritonitis, on ceftriaxone  6. HFrEF 30-35%, most recent echo with EF of 55%  7. Cirrhosis; probably cardiac cirrhosis?, Hepatitis serology negative, CEA 0.9, s/p biopsy   8. Ascites status post large volume paracentesis x2, 5.5 L and 4.9 L  9.  Probably liver metastatic disease??  10. Paroxysmal atrial fibrillation, anticoagulation on hold    Plan:    · Continue Bumex 2 mg po daily   · Increase potassium supplementation to 40mEq BID  · Continue to monitor renal function

## 2019-09-04 NOTE — PROGRESS NOTES
rales/wheezing/rubs  HEART: RRR, S1 and S2 normal, no murmur, rub or gallop. DP pulses 2/4  ABDOMEN: SNTND, no masses, no organomegaly, no guarding, rebound or rigidity. GENITOURINARY: Urinary cath not present   EXTREMITIES:  Extremities normal, atraumatic, no cyanosis or edema. Distal pulses equal bilaterally  SKIN: Skin color, texture, turgor normal, no rashes or lesions  MUSCULOSKELETAL: No joint swelling, no muscle tenderness. Normal ROM in extremities. NEUROLOGIC: Alert & Oriented      Labs:  Na/K/Cl/CO2:  145/3.1/106/28 (09/04 4760)  BUN/Cr/glu/ALT/AST/amyl/lip:  35/1.3/--/19/16/--/-- (09/04 0524)  WBC/Hgb/Hct/Plts:  11.5/9.9/29.7/172 (09/04 0524)  estimated creatinine clearance is 42 mL/min (A) (based on SCr of 1.3 mg/dL (H)). Other pertinent labs as noted below    Radiology:  IR US GUIDED PARACENTESIS   Final Result   Successful paracentesis. This procedure was performed by Tera Tirado PA-C under the   indirect supervision of Leticia Leon MD.      The exam has been dictated and signed by Tera Tirado PA-C. Itzel Khan MD, Radiologist, have reviewed the images and   report and concur with these findings. CT NEEDLE BIOPSY LIVER PERCUTANEOUS   Final Result   Successful uncomplicated CT and fluoroscopic guided liver   biopsy. If there are any physician concerns regarding this report, a   Radiologist can be reached by calling the following number [phone]. CT GUIDED NEEDLE PLACEMENT   Final Result   Successful uncomplicated CT and fluoroscopic guided liver   biopsy. If there are any physician concerns regarding this report, a   Radiologist can be reached by calling the following number [phone]. US ABDOMEN LIMITED   Final Result   1. Signs for liver cirrhosis. 2. Could not conspicuous identify focal lesion in the liver, this is   seen in the peripheral aspect of the right lobe was difficult to be   accessible by ultrasound. enlarged cardiac mediastinal silhouette with underlying   mild central pulmonary vascular congestion and thoracic aortic   vascular calcifications. 3. Nonspecific bibasilar airspace disease, findings can be seen in   infiltrate/pneumonia and/or atelectasis. Jose Antonioynn Nida 4. 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 5. Gaseous distended of the stomach in the midline. XR CHEST PORTABLE   Final Result   Mild CHF without edema. Right IJ central line in the superior vena cava without complications. CT ABDOMEN PELVIS WO CONTRAST   Final Result   There are bilateral infiltrates at the lung bases, likely related to   atelectasis     There is findings to suggest cirrhosis   Cholelithiasis   Ascites   Multiple lesions in the liver                           XR CHEST PORTABLE   Final Result   Cardiomegaly   Findings compatible with atherosclerotic disease of the aorta. No acute infiltrate    There is a poor inspiratory effort                       US DUP ABD PEL RETRO SCROT COMPLETE    (Results Pending)       A/P:    Principal problem:     Septic shock /SBP/Bactermia  · Blood Culture 2x (8/29): E Coli - continue on Rocephin 2 g IV q24h, per ID recs  · Procalcitonin on 9/2/19 was 2.11  · Ascites fluid culture negative for bacterial growth 8/30/19     Active Problems:       Shock, multifactorial-resolved  · Septic and hypovolemia secondary to GI loss and 3rd spacing vs infection  · Coumadin held secondary to supratherapeutic INR, liver cirrhosis  ? Most recent INR 1.5 at 0524  ?  Will consult hematology to see if coumadin can be restarted upon discharge  · Septic shock treated with Rocephin     Afib  · Continue to hold off anticoags in view of supra therpeutic INR  · Continue to hold digoxin     History of HFrEF, Hx Afib, Hx of MI  · Echo: (1/2019): 56% severely dilated atrium, severe TR, pulm hypertension, LVDD noted, Afib  · ICD in place on Coumadin, Digoxin        Anasacra  Ascites- 2/2 most likely

## 2019-09-05 ENCOUNTER — CARE COORDINATION (OUTPATIENT)
Dept: CASE MANAGEMENT | Age: 84
End: 2019-09-05

## 2019-09-05 DIAGNOSIS — R65.21 SEPTIC SHOCK (HCC): Primary | ICD-10-CM

## 2019-09-05 DIAGNOSIS — A41.9 SEPTIC SHOCK (HCC): Primary | ICD-10-CM

## 2019-09-05 LAB
BLOOD BANK DISPENSE STATUS: NORMAL
BLOOD BANK DISPENSE STATUS: NORMAL
BLOOD BANK PRODUCT CODE: NORMAL
BLOOD BANK PRODUCT CODE: NORMAL
BPU ID: NORMAL
BPU ID: NORMAL
DESCRIPTION BLOOD BANK: NORMAL
DESCRIPTION BLOOD BANK: NORMAL

## 2019-09-05 PROCEDURE — 1111F DSCHRG MED/CURRENT MED MERGE: CPT | Performed by: FAMILY MEDICINE

## 2019-09-05 NOTE — CARE COORDINATION
[facility] Transitions Initial Follow Up Call    Call within 2 business days of discharge: Yes    Patient: Jhonathan Tse. Patient : 1935   MRN: [id number]  Reason for Admission: Septic shock  Discharge Date: 19 RARS: Readmission Risk Score: 20      Last Discharge [facility]       Complaint Diagnosis Description Type Department Provider    19 Extremity Weakness Septic shock ([facility] [address] . .. ED to Hosp-Admission (Discharged) (ADMITTED) ELISEO Busby  52., MD; Gato Parsons,... Spoke with: Delia Norwood, patient's wife on hipaa form    Facility: [facility]    Non-face-to-face services provided:  Scheduled appointment with PCP-Verified with patient's wife, appt with Dr. Rafaela Hong on 19 at 0830. Patient's wife reports she will transport patient to appt. Scheduled appointment with Specialist-Per patient's wife, appt with Dr. Devin Vera 19 at 1 and Dr. Fiordaliza Neal on 19 at 08375 Bertram Nuñez  Obtained and reviewed discharge summary and/or continuity of care documents  Reviewed and followed up on pending diagnostic tests and treatments-Per patient's wife, patient to have PT/INR checked at Dr. Giorgio Tovar office on 19    Care Transitions 24 Hour Call    Do you have any ongoing symptoms?:  Yes  Patient-reported symptoms:  Other (Comment: diarrhea)  Do you have a copy of your discharge instructions?:  Yes  Do you have all of your prescriptions and are they filled?:  No  Have you been contacted by a [facility] Pharmacist?:  No  Have you scheduled your follow up appointment?:  Yes  How are you going to get to your appointment?:  Car - family or friend to transport  Were you discharged with any Home Care or Post Acute Services:  No  Do you feel like you have everything you need to keep you well at home?:  Yes  Care Transitions Interventions  No Identified Needs       Spoke with patient's wife Delia Norwood for initial care transition call post hospital discharge. Med review completed; 1111F entered.   Per patient's wife. Patient's wife denies any abnormal or uncontrolled bleeding. Patient's wife instructed to report any abnormal bleeding and to call 911 for any uncontrolled bleeding. Patient's wife verbalized understanding. Per [facility], Dr. Osmin Gresham monitors PT/INR and patient is to have PT/INR checked at appt on Monday 9/9/19. No appointment noted in EMR. Per AVS, patient to go to Dr. Edgar Edge office on 9/9/19 at 0830. Spoke to Alyssa Villanueva at Dr. Edgar Edge office, appt scheduled for 9/9/19 at 1130. Patient's wife updated and instructed to report to 's office at 1100 on 9/9/19. Patient's wife agreeable to plan and verbalized understanding. Patient's wife reports she will transport patient to appts and denies need for home care. Patient's wife denies any further needs, questions, or concerns at this time. Contact information for this CTN provided to patient's wife with instructions to contact the appropriate person depending on need/question. Patient's wife is agreeable to future follow up calls.      Follow Up  Future Appointments   Date Time Provider Daria Sampson   9/19/2019  3:15 PM Bruna Ballesteros MD [facility]       Mirna Macdonald RN

## 2019-09-06 LAB
ADDENDUM ELECTROPHORESIS URINE RANDOM: NORMAL
IMMUNOFIXATION URINE: NORMAL

## 2019-09-09 ENCOUNTER — OFFICE VISIT (OUTPATIENT)
Dept: FAMILY MEDICINE CLINIC | Age: 84
End: 2019-09-09
Payer: MEDICARE

## 2019-09-09 ENCOUNTER — HOSPITAL ENCOUNTER (OUTPATIENT)
Age: 84
Discharge: HOME | End: 2019-09-11
Payer: MEDICARE

## 2019-09-09 ENCOUNTER — ANTI-COAG VISIT (OUTPATIENT)
Dept: FAMILY MEDICINE CLINIC | Age: 84
End: 2019-09-09

## 2019-09-09 VITALS
WEIGHT: 152 LBS | SYSTOLIC BLOOD PRESSURE: 122 MMHG | BODY MASS INDEX: 23.11 KG/M2 | DIASTOLIC BLOOD PRESSURE: 62 MMHG | RESPIRATION RATE: 16 BRPM | HEART RATE: 92 BPM | OXYGEN SATURATION: 99 %

## 2019-09-09 DIAGNOSIS — R65.21 SEPTIC SHOCK DUE TO ESCHERICHIA COLI (HCC): ICD-10-CM

## 2019-09-09 DIAGNOSIS — I48.91 ATRIAL FIBRILLATION, UNSPECIFIED TYPE (HCC): Primary | ICD-10-CM

## 2019-09-09 DIAGNOSIS — A41.51 SEPTIC SHOCK DUE TO ESCHERICHIA COLI (HCC): ICD-10-CM

## 2019-09-09 DIAGNOSIS — I48.91 ATRIAL FIBRILLATION, UNSPECIFIED TYPE (HCC): ICD-10-CM

## 2019-09-09 DIAGNOSIS — R60.1 ANASARCA: ICD-10-CM

## 2019-09-09 LAB
ALBUMIN SERPL-MCNC: 4 G/DL (ref 3.5–5.2)
ALP BLD-CCNC: 78 U/L (ref 40–129)
ALT SERPL-CCNC: 22 U/L (ref 0–40)
ANION GAP SERPL CALCULATED.3IONS-SCNC: 13 MMOL/L (ref 7–16)
AST SERPL-CCNC: 24 U/L (ref 0–39)
BASOPHILS ABSOLUTE: 0.02 E9/L (ref 0–0.2)
BASOPHILS RELATIVE PERCENT: 0.2 % (ref 0–2)
BILIRUB SERPL-MCNC: 1.8 MG/DL (ref 0–1.2)
BUN BLDV-MCNC: 34 MG/DL (ref 8–23)
CALCIUM SERPL-MCNC: 9.6 MG/DL (ref 8.6–10.2)
CHLORIDE BLD-SCNC: 99 MMOL/L (ref 98–107)
CO2: 30 MMOL/L (ref 22–29)
CREAT SERPL-MCNC: 2.3 MG/DL (ref 0.7–1.2)
EOSINOPHILS ABSOLUTE: 0.15 E9/L (ref 0.05–0.5)
EOSINOPHILS RELATIVE PERCENT: 1.4 % (ref 0–6)
GFR AFRICAN AMERICAN: 33
GFR NON-AFRICAN AMERICAN: 27 ML/MIN/1.73
GLUCOSE BLD-MCNC: 110 MG/DL (ref 74–99)
HCT VFR BLD CALC: 35.7 % (ref 37–54)
HEMOGLOBIN: 11.5 G/DL (ref 12.5–16.5)
IMMATURE GRANULOCYTES #: 0.12 E9/L
IMMATURE GRANULOCYTES %: 1.1 % (ref 0–5)
INTERNATIONAL NORMALIZATION RATIO, POC: 1.5
LYMPHOCYTES ABSOLUTE: 0.8 E9/L (ref 1.5–4)
LYMPHOCYTES RELATIVE PERCENT: 7.5 % (ref 20–42)
MAGNESIUM: 2 MG/DL (ref 1.6–2.6)
MCH RBC QN AUTO: 31.2 PG (ref 26–35)
MCHC RBC AUTO-ENTMCNC: 32.2 % (ref 32–34.5)
MCV RBC AUTO: 96.7 FL (ref 80–99.9)
MONOCYTES ABSOLUTE: 1.5 E9/L (ref 0.1–0.95)
MONOCYTES RELATIVE PERCENT: 14 % (ref 2–12)
NEUTROPHILS ABSOLUTE: 8.11 E9/L (ref 1.8–7.3)
NEUTROPHILS RELATIVE PERCENT: 75.8 % (ref 43–80)
PDW BLD-RTO: 16.5 FL (ref 11.5–15)
PLATELET # BLD: 186 E9/L (ref 130–450)
PMV BLD AUTO: 11.9 FL (ref 7–12)
POTASSIUM SERPL-SCNC: 5.2 MMOL/L (ref 3.5–5)
PROTHROMBIN TIME, POC: 17.9
RBC # BLD: 3.69 E12/L (ref 3.8–5.8)
SODIUM BLD-SCNC: 142 MMOL/L (ref 132–146)
TOTAL PROTEIN: 6.5 G/DL (ref 6.4–8.3)
WBC # BLD: 10.7 E9/L (ref 4.5–11.5)

## 2019-09-09 PROCEDURE — 80053 COMPREHEN METABOLIC PANEL: CPT

## 2019-09-09 PROCEDURE — 83735 ASSAY OF MAGNESIUM: CPT

## 2019-09-09 PROCEDURE — 1111F DSCHRG MED/CURRENT MED MERGE: CPT | Performed by: FAMILY MEDICINE

## 2019-09-09 PROCEDURE — 85610 PROTHROMBIN TIME: CPT | Performed by: FAMILY MEDICINE

## 2019-09-09 PROCEDURE — 85025 COMPLETE CBC W/AUTO DIFF WBC: CPT

## 2019-09-09 PROCEDURE — 99496 TRANSJ CARE MGMT HIGH F2F 7D: CPT | Performed by: FAMILY MEDICINE

## 2019-09-10 ENCOUNTER — CARE COORDINATION (OUTPATIENT)
Dept: CASE MANAGEMENT | Age: 84
End: 2019-09-10

## 2019-09-11 PROBLEM — N18.30 CHRONIC KIDNEY DISEASE, STAGE III (MODERATE) (HCC): Status: ACTIVE | Noted: 2019-09-11

## 2019-09-17 ENCOUNTER — CARE COORDINATION (OUTPATIENT)
Dept: CASE MANAGEMENT | Age: 84
End: 2019-09-17

## 2019-09-17 NOTE — CARE COORDINATION
[facility] Transitions Follow Up Call    2019    Patient: Rosalind Mckeon. Patient : 1935   MRN: [id number]  Reason for Admission: septic shock  Discharge Date: 19 RARS: Readmission Risk Score: 21         Spoke with: patient's wife Jael(on HIPAA)    Care Transitions Subsequent and Final Call    Subsequent and Final Calls  Do you have any ongoing symptoms?:  No  Have your medications changed?:  No  Do you have any questions related to your medications?:  No  Do you currently have any active services?:  No  Do you have any needs or concerns that I can assist you with?:  No  Identified Barriers:  None  Care Transitions Interventions  No Identified Needs  Other Interventions:          -Spoke with patient's wife Jael(on HIPAA) for final care transition call.   Jessika Leigh states PCP INR appointment had to be cancelled yesterday  because her  was feeling weak and not up to going out but he is feeling better today.  Jessikamichael Leigh states her  will go to appointment on Thursday(19) with Dr Janell South and to the  rescheduled INR visit on 19.   -Sela Goldberg states her  has been weighing himself and checking a FBS daily and writing readings down. Weight today 147 lbs and FBS 98. Sela Goldberg states her 's feet and ankle swelling is now gone. -CTN explained the Care Coordination Service  available at no cost through the physician practice, Sela Goldberg declines on behalf of her  at this time.  -Patient's wife denies any further  needs, questions, or concerns at this time and is agreeable to CTN signing off.   Follow Up  Future Appointments   Date Time Provider Daria Sampson   2019  3:15 PM Gustabo Berrios MD [facility]   2019 11:00 AM SCHEDULE, MIT[facility]   10/8/2019 12:30 PM Charlotte Stearns MD [facility]       Araseli Small RN

## 2019-09-18 ENCOUNTER — TELEPHONE (OUTPATIENT)
Dept: HEMATOLOGY | Age: 84
End: 2019-09-18

## 2019-09-18 NOTE — TELEPHONE ENCOUNTER
I called the patient and I spoke with his wife, she confirmed his 9/19/19 appt at 3:15pm in [place] department. [address] [facility]. [facility],  [address] Directions: Park in the ER parking lot enter through door B, walk straight until you see the elevators on the right. Take the elevators down to 1R, when you come off the elevators turn left and notify the  in the glass case that you are here to see Dr. Hamlin Alert.        Electronically signed by Sheri Plata on 9/18/19 at 8:45 AM

## 2019-09-19 ENCOUNTER — OFFICE VISIT (OUTPATIENT)
Dept: HEMATOLOGY | Age: 84
End: 2019-09-19
Payer: MEDICARE

## 2019-09-19 VITALS
HEART RATE: 58 BPM | BODY MASS INDEX: 22.05 KG/M2 | WEIGHT: 148.9 LBS | HEIGHT: 69 IN | DIASTOLIC BLOOD PRESSURE: 53 MMHG | SYSTOLIC BLOOD PRESSURE: 98 MMHG

## 2019-09-19 DIAGNOSIS — K76.6 PORTAL HYPERTENSION (HCC): ICD-10-CM

## 2019-09-19 DIAGNOSIS — K74.69 OTHER CIRRHOSIS OF LIVER (HCC): Primary | ICD-10-CM

## 2019-09-19 PROCEDURE — 1036F TOBACCO NON-USER: CPT | Performed by: TRANSPLANT SURGERY

## 2019-09-19 PROCEDURE — 99204 OFFICE O/P NEW MOD 45 MIN: CPT | Performed by: TRANSPLANT SURGERY

## 2019-09-19 PROCEDURE — 4040F PNEUMOC VAC/ADMIN/RCVD: CPT | Performed by: TRANSPLANT SURGERY

## 2019-09-19 PROCEDURE — 1111F DSCHRG MED/CURRENT MED MERGE: CPT | Performed by: TRANSPLANT SURGERY

## 2019-09-19 PROCEDURE — G8598 ASA/ANTIPLAT THER USED: HCPCS | Performed by: TRANSPLANT SURGERY

## 2019-09-19 PROCEDURE — G8427 DOCREV CUR MEDS BY ELIG CLIN: HCPCS | Performed by: TRANSPLANT SURGERY

## 2019-09-19 PROCEDURE — G8420 CALC BMI NORM PARAMETERS: HCPCS | Performed by: TRANSPLANT SURGERY

## 2019-09-19 PROCEDURE — 1123F ACP DISCUSS/DSCN MKR DOCD: CPT | Performed by: TRANSPLANT SURGERY

## 2019-09-19 ASSESSMENT — ENCOUNTER SYMPTOMS
ABDOMINAL DISTENTION: 1
VOMITING: 0
PHOTOPHOBIA: 0
NAUSEA: 0
SHORTNESS OF BREATH: 0
DIARRHEA: 0
BLOOD IN STOOL: 0
ABDOMINAL PAIN: 0
CONSTIPATION: 0
BACK PAIN: 0
EYE DISCHARGE: 0
EYE PAIN: 0

## 2019-09-19 NOTE — PROGRESS NOTES
combination of alcohol and obesity  - will refer back to Dr. Vijay Ballesteros for management of portal hypertension   - will plan for an ultrasound of his liver every 6 months to evaluate for any new masses  - continue low salt diet    45 Minutes of which greater than 50% was spent counseling or coordinating his care. Thank you for the consultation allowing me to take part in Mr. Adele billy.      Payton Saez M.D.  9/19/2019  12:21 PM

## 2019-09-22 ENCOUNTER — TELEPHONE (OUTPATIENT)
Dept: FAMILY MEDICINE CLINIC | Age: 84
End: 2019-09-22

## 2019-09-22 ASSESSMENT — ENCOUNTER SYMPTOMS
ABDOMINAL PAIN: 0
COLOR CHANGE: 0
VOMITING: 0
WHEEZING: 0
CHEST TIGHTNESS: 0
SHORTNESS OF BREATH: 0
BLOOD IN STOOL: 0
CONSTIPATION: 0
COUGH: 0
DIARRHEA: 0

## 2019-09-22 NOTE — PROGRESS NOTES
Post-Discharge Transitional Care Management Services or Hospital Follow Up      Martin Enrique. YOB: 1935    Date of Office Visit:  9/9/2019  Date of Hospital Admission: 8/27/19  Date of Hospital Discharge: 9/4/19  Readmission Risk Score(high >=14%. Medium >=10%):Readmission Risk Score: 20      Care management risk score Rising risk (score 2-5) and Complex Care (Scores >=6): 4     Non face to face  following discharge, date last encounter closed (first attempt may have been earlier): 9/5 by [facility]    Call initiated 2 business days of discharge: yes     Patient Active Problem List   Diagnosis    CAD (coronary artery disease)    A-fib ([address]    HTN (hypertension)    Type 2 diabetes mellitus with complication ([address]    Hyperlipidemia    Vitamin D deficiency    Renal insufficiency    Joint pain    Gastritis    Polymyalgia rheumatica syndrome ([address]    Primary osteoarthritis of both knees    Hypothyroidism    Exudative age-related macular degeneration ([address]    Atrial fibrillation ([address]    Chronic combined systolic and diastolic congestive heart failure ([address]    Arthritis of right knee    Alcoholism in remission ([address]    Moderate protein-calorie malnutrition ([address]    Chronic kidney disease, stage III (moderate) ([address]       No Known Allergies    Medications listed as ordered at the time of discharge from [facility]   Danica Sanchez Tucker.    Home Medication Instructions INOCENCIO:    Printed on:09/22/19 0439   Medication Information                      atorvastatin (LIPITOR) 20 MG tablet  Take 1 tablet by mouth daily             bumetanide (BUMEX) 2 MG tablet  Take 1 tablet by mouth daily             carvedilol (COREG CR) 80 MG CP24 extended release capsule  TAKE 1 CAPSULE DAILY             digoxin (LANOXIN) 125 MCG tablet  TAKE 1 TABLET DAILY             ENTRESTO 49-51 MG per tablet  TAKE 1 TABLET TWICE A DAY             JANUVIA 100 MG tablet  TAKE 1 TABLET DAILY             potassium chloride 09/09/19 152 lb (68.9 kg)   09/04/19 160 lb 1.6 oz (72.6 kg)     BP Readings from Last 3 Encounters:   09/19/19 (!) 98/53   09/09/19 122/62   09/04/19 (!) 128/56       Physical Exam   Constitutional: He appears well-developed and well-nourished. No distress. Cardiovascular: Normal rate and normal heart sounds. Pulmonary/Chest: Effort normal and breath sounds normal. He has no wheezes. He has no rales. Abdominal: Soft. Bowel sounds are normal. He exhibits no distension and no mass. There is no tenderness. There is no rebound and no guarding. Musculoskeletal: He exhibits no edema. Skin: Skin is warm and dry. Capillary refill takes less than 2 seconds. No rash noted. No erythema. Vitals reviewed. Assessment/Plan:  1. Atrial fibrillation, unspecified type ([facility] [address]  Follow up labs  - POCT INR  - CBC Auto Differential; Future  - Comprehensive Metabolic Panel; Future  - Magnesium; Future  - AL DISCHARGE MEDS RECONCILED W/ CURRENT OUTPATIENT MED LIST    2. Septic shock due to Escherichia coli ([facility] [address]  - AL DISCHARGE MEDS RECONCILED W/ CURRENT OUTPATIENT MED LIST    3.  Anasarca  - AL DISCHARGE MEDS RECONCILED W/ CURRENT OUTPATIENT MED LIST      Follow up with Dr Lily Gay Making: high complexity

## 2019-09-23 ENCOUNTER — HOSPITAL ENCOUNTER (INPATIENT)
Age: 84
LOS: 3 days | Discharge: HOME | DRG: 872 | End: 2019-09-26
Attending: FAMILY MEDICINE | Admitting: FAMILY MEDICINE
Payer: MEDICARE

## 2019-09-23 ENCOUNTER — APPOINTMENT (OUTPATIENT)
Dept: GENERAL RADIOLOGY | Age: 84
End: 2019-09-23
Payer: MEDICARE

## 2019-09-23 ENCOUNTER — TELEPHONE (OUTPATIENT)
Dept: FAMILY MEDICINE CLINIC | Age: 84
End: 2019-09-23

## 2019-09-23 ENCOUNTER — TELEPHONE (OUTPATIENT)
Dept: OTHER | Facility: CLINIC | Age: 84
End: 2019-09-23

## 2019-09-23 ENCOUNTER — HOSPITAL ENCOUNTER (EMERGENCY)
Age: 84
Discharge: ANOTHER HOSPITAL | End: 2019-09-23
Attending: EMERGENCY MEDICINE
Payer: MEDICARE

## 2019-09-23 ENCOUNTER — HOSPITAL ENCOUNTER (OUTPATIENT)
Age: 84
Discharge: HOME | End: 2019-09-23
Payer: MEDICARE

## 2019-09-23 VITALS
BODY MASS INDEX: 21.92 KG/M2 | SYSTOLIC BLOOD PRESSURE: 102 MMHG | OXYGEN SATURATION: 96 % | HEART RATE: 86 BPM | WEIGHT: 148 LBS | RESPIRATION RATE: 16 BRPM | DIASTOLIC BLOOD PRESSURE: 42 MMHG | HEIGHT: 69 IN | TEMPERATURE: 99.7 F

## 2019-09-23 DIAGNOSIS — A41.9 SEPTICEMIA (HCC): Primary | ICD-10-CM

## 2019-09-23 DIAGNOSIS — R19.7 DIARRHEA, UNSPECIFIED TYPE: ICD-10-CM

## 2019-09-23 DIAGNOSIS — R50.9 FEVER, UNSPECIFIED FEVER CAUSE: ICD-10-CM

## 2019-09-23 DIAGNOSIS — R53.1 GENERAL WEAKNESS: ICD-10-CM

## 2019-09-23 DIAGNOSIS — D72.829 LEUKOCYTOSIS, UNSPECIFIED TYPE: ICD-10-CM

## 2019-09-23 DIAGNOSIS — R63.0 ANOREXIA: ICD-10-CM

## 2019-09-23 DIAGNOSIS — I48.20 CHRONIC ATRIAL FIBRILLATION (HCC): ICD-10-CM

## 2019-09-23 DIAGNOSIS — E86.0 DEHYDRATION: ICD-10-CM

## 2019-09-23 DIAGNOSIS — K76.9 LIVER DISEASE: ICD-10-CM

## 2019-09-23 DIAGNOSIS — N17.9 ACUTE KIDNEY INJURY (HCC): ICD-10-CM

## 2019-09-23 LAB
ALBUMIN SERPL-MCNC: 3.8 G/DL (ref 3.5–5.2)
ALP BLD-CCNC: 112 U/L (ref 40–129)
ALT SERPL-CCNC: 17 U/L (ref 0–40)
ANION GAP SERPL CALCULATED.3IONS-SCNC: 14 MMOL/L (ref 7–16)
APTT: 33.5 SEC (ref 25.7–34.7)
AST SERPL-CCNC: 16 U/L (ref 0–39)
BASOPHILS ABSOLUTE: 0.01 E9/L (ref 0–0.2)
BASOPHILS RELATIVE PERCENT: 0.1 % (ref 0–2)
BILIRUB SERPL-MCNC: 1.7 MG/DL (ref 0–1.2)
BILIRUBIN URINE: NEGATIVE
BLOOD, URINE: NEGATIVE
BUN BLDV-MCNC: 40 MG/DL (ref 8–23)
CALCIUM SERPL-MCNC: 9.5 MG/DL (ref 8.6–10.2)
CHLORIDE BLD-SCNC: 98 MMOL/L (ref 98–107)
CLARITY: CLEAR
CO2: 25 MMOL/L (ref 22–29)
COLOR: YELLOW
CREAT SERPL-MCNC: 2 MG/DL (ref 0.7–1.2)
DIGOXIN LEVEL: 1.2 NG/ML (ref 0.8–2)
EKG ATRIAL RATE: 53 BPM
EKG Q-T INTERVAL: 336 MS
EKG QRS DURATION: 106 MS
EKG QTC CALCULATION (BAZETT): 431 MS
EKG R AXIS: -51 DEGREES
EKG T AXIS: 36 DEGREES
EKG VENTRICULAR RATE: 99 BPM
EOSINOPHILS ABSOLUTE: 0.04 E9/L (ref 0.05–0.5)
EOSINOPHILS RELATIVE PERCENT: 0.3 % (ref 0–6)
GFR AFRICAN AMERICAN: 39
GFR NON-AFRICAN AMERICAN: 32 ML/MIN/1.73
GLUCOSE BLD-MCNC: 123 MG/DL (ref 74–99)
GLUCOSE URINE: NEGATIVE MG/DL
HCT VFR BLD CALC: 33.5 % (ref 37–54)
HEMOGLOBIN: 11.1 G/DL (ref 12.5–16.5)
IMMATURE GRANULOCYTES #: 0.09 E9/L
IMMATURE GRANULOCYTES %: 0.8 % (ref 0–5)
INR BLD: 1.8
KETONES, URINE: NEGATIVE MG/DL
LACTIC ACID, SEPSIS: 1.4 MMOL/L (ref 0.5–1.9)
LEUKOCYTE ESTERASE, URINE: NEGATIVE
LIPASE: 46 U/L (ref 13–60)
LYMPHOCYTES ABSOLUTE: 0.89 E9/L (ref 1.5–4)
LYMPHOCYTES RELATIVE PERCENT: 7.6 % (ref 20–42)
MCH RBC QN AUTO: 30.7 PG (ref 26–35)
MCHC RBC AUTO-ENTMCNC: 33.1 % (ref 32–34.5)
MCV RBC AUTO: 92.5 FL (ref 80–99.9)
MONOCYTES ABSOLUTE: 1.65 E9/L (ref 0.1–0.95)
MONOCYTES RELATIVE PERCENT: 14 % (ref 2–12)
NEUTROPHILS ABSOLUTE: 9.07 E9/L (ref 1.8–7.3)
NEUTROPHILS RELATIVE PERCENT: 77.2 % (ref 43–80)
NITRITE, URINE: NEGATIVE
PDW BLD-RTO: 15.3 FL (ref 11.5–15)
PH UA: 5.5 (ref 5–9)
PLATELET # BLD: 197 E9/L (ref 130–450)
PMV BLD AUTO: 10.2 FL (ref 7–12)
POTASSIUM REFLEX MAGNESIUM: 4.5 MMOL/L (ref 3.5–5)
PRO-BNP: 4184 PG/ML (ref 0–450)
PROTEIN UA: NEGATIVE MG/DL
PROTHROMBIN TIME: 19.9 SEC (ref 9.3–12.4)
RBC # BLD: 3.62 E12/L (ref 3.8–5.8)
SODIUM BLD-SCNC: 137 MMOL/L (ref 132–146)
SPECIFIC GRAVITY UA: 1.01 (ref 1–1.03)
TOTAL PROTEIN: 6.6 G/DL (ref 6.4–8.3)
TROPONIN: 0.03 NG/ML (ref 0–0.03)
UROBILINOGEN, URINE: 0.2 E.U./DL
WBC # BLD: 11.8 E9/L (ref 4.5–11.5)

## 2019-09-23 PROCEDURE — 2580000003 HC RX 258: Performed by: NURSE PRACTITIONER

## 2019-09-23 PROCEDURE — 71046 X-RAY EXAM CHEST 2 VIEWS: CPT

## 2019-09-23 PROCEDURE — 93005 ELECTROCARDIOGRAM TRACING: CPT | Performed by: NURSE PRACTITIONER

## 2019-09-23 PROCEDURE — 85025 COMPLETE CBC W/AUTO DIFF WBC: CPT

## 2019-09-23 PROCEDURE — 83690 ASSAY OF LIPASE: CPT

## 2019-09-23 PROCEDURE — 6370000000 HC RX 637 (ALT 250 FOR IP): Performed by: EMERGENCY MEDICINE

## 2019-09-23 PROCEDURE — 80053 COMPREHEN METABOLIC PANEL: CPT

## 2019-09-23 PROCEDURE — 81003 URINALYSIS AUTO W/O SCOPE: CPT

## 2019-09-23 PROCEDURE — 87449 NOS EACH ORGANISM AG IA: CPT

## 2019-09-23 PROCEDURE — 2580000003 HC RX 258: Performed by: EMERGENCY MEDICINE

## 2019-09-23 PROCEDURE — 96365 THER/PROPH/DIAG IV INF INIT: CPT

## 2019-09-23 PROCEDURE — 2500000003 HC RX 250 WO HCPCS: Performed by: EMERGENCY MEDICINE

## 2019-09-23 PROCEDURE — 87324 CLOSTRIDIUM AG IA: CPT

## 2019-09-23 PROCEDURE — 87088 URINE BACTERIA CULTURE: CPT

## 2019-09-23 PROCEDURE — 99285 EMERGENCY DEPT VISIT HI MDM: CPT

## 2019-09-23 PROCEDURE — 2060000000 HC ICU INTERMEDIATE R&B

## 2019-09-23 PROCEDURE — 80162 ASSAY OF DIGOXIN TOTAL: CPT

## 2019-09-23 PROCEDURE — 6360000002 HC RX W HCPCS: Performed by: EMERGENCY MEDICINE

## 2019-09-23 PROCEDURE — A0426 ALS 1: HCPCS

## 2019-09-23 PROCEDURE — 6370000000 HC RX 637 (ALT 250 FOR IP): Performed by: FAMILY MEDICINE

## 2019-09-23 PROCEDURE — 84484 ASSAY OF TROPONIN QUANT: CPT

## 2019-09-23 PROCEDURE — 2580000003 HC RX 258: Performed by: FAMILY MEDICINE

## 2019-09-23 PROCEDURE — 83605 ASSAY OF LACTIC ACID: CPT

## 2019-09-23 PROCEDURE — 36415 COLL VENOUS BLD VENIPUNCTURE: CPT

## 2019-09-23 PROCEDURE — 96375 TX/PRO/DX INJ NEW DRUG ADDON: CPT

## 2019-09-23 PROCEDURE — 87045 FECES CULTURE AEROBIC BACT: CPT

## 2019-09-23 PROCEDURE — 83880 ASSAY OF NATRIURETIC PEPTIDE: CPT

## 2019-09-23 PROCEDURE — 87040 BLOOD CULTURE FOR BACTERIA: CPT

## 2019-09-23 PROCEDURE — 85610 PROTHROMBIN TIME: CPT

## 2019-09-23 PROCEDURE — 85730 THROMBOPLASTIN TIME PARTIAL: CPT

## 2019-09-23 PROCEDURE — A0425 GROUND MILEAGE: HCPCS

## 2019-09-23 RX ORDER — RANOLAZINE 500 MG/1
500 TABLET, EXTENDED RELEASE ORAL 2 TIMES DAILY
Status: DISCONTINUED | OUTPATIENT
Start: 2019-09-23 | End: 2019-09-26 | Stop reason: HOSPADM

## 2019-09-23 RX ORDER — CARVEDILOL 25 MG/1
25 TABLET ORAL 2 TIMES DAILY
Status: DISCONTINUED | OUTPATIENT
Start: 2019-09-23 | End: 2019-09-26

## 2019-09-23 RX ORDER — WARFARIN SODIUM 7.5 MG/1
7.5 TABLET ORAL
Status: DISCONTINUED | OUTPATIENT
Start: 2019-09-24 | End: 2019-09-26 | Stop reason: HOSPADM

## 2019-09-23 RX ORDER — SODIUM CHLORIDE 9 MG/ML
INJECTION, SOLUTION INTRAVENOUS CONTINUOUS
Status: DISCONTINUED | OUTPATIENT
Start: 2019-09-23 | End: 2019-09-26 | Stop reason: HOSPADM

## 2019-09-23 RX ORDER — ATORVASTATIN CALCIUM 20 MG/1
20 TABLET, FILM COATED ORAL DAILY
Status: DISCONTINUED | OUTPATIENT
Start: 2019-09-24 | End: 2019-09-26 | Stop reason: HOSPADM

## 2019-09-23 RX ORDER — 0.9 % SODIUM CHLORIDE 0.9 %
500 INTRAVENOUS SOLUTION INTRAVENOUS ONCE
Status: COMPLETED | OUTPATIENT
Start: 2019-09-23 | End: 2019-09-23

## 2019-09-23 RX ORDER — DIGOXIN 125 MCG
125 TABLET ORAL DAILY
Status: DISCONTINUED | OUTPATIENT
Start: 2019-09-24 | End: 2019-09-26 | Stop reason: HOSPADM

## 2019-09-23 RX ORDER — ACETAMINOPHEN 500 MG
500 TABLET ORAL ONCE
Status: COMPLETED | OUTPATIENT
Start: 2019-09-23 | End: 2019-09-23

## 2019-09-23 RX ORDER — WARFARIN SODIUM 5 MG/1
5 TABLET ORAL
Status: DISCONTINUED | OUTPATIENT
Start: 2019-09-25 | End: 2019-09-26 | Stop reason: HOSPADM

## 2019-09-23 RX ADMIN — ACETAMINOPHEN 500 MG: 500 TABLET ORAL at 17:38

## 2019-09-23 RX ADMIN — SODIUM CHLORIDE: 9 INJECTION, SOLUTION INTRAVENOUS at 23:50

## 2019-09-23 RX ADMIN — CEFTRIAXONE SODIUM 2 G: 2 INJECTION, POWDER, FOR SOLUTION INTRAMUSCULAR; INTRAVENOUS at 17:53

## 2019-09-23 RX ADMIN — METRONIDAZOLE 500 MG: 500 INJECTION, SOLUTION INTRAVENOUS at 18:13

## 2019-09-23 RX ADMIN — CARVEDILOL 25 MG: 25 TABLET, FILM COATED ORAL at 23:49

## 2019-09-23 RX ADMIN — SODIUM CHLORIDE 500 ML: 9 INJECTION, SOLUTION INTRAVENOUS at 15:51

## 2019-09-23 ASSESSMENT — PAIN SCALES - GENERAL
PAINLEVEL_OUTOF10: 0

## 2019-09-24 LAB
ALBUMIN SERPL-MCNC: 3.2 G/DL (ref 3.5–5.2)
ALP BLD-CCNC: 101 U/L (ref 40–129)
ALT SERPL-CCNC: 17 U/L (ref 0–40)
ANION GAP SERPL CALCULATED.3IONS-SCNC: 14 MMOL/L (ref 7–16)
AST SERPL-CCNC: 14 U/L (ref 0–39)
BILIRUB SERPL-MCNC: 1.4 MG/DL (ref 0–1.2)
BUN BLDV-MCNC: 39 MG/DL (ref 8–23)
C DIFF TOXIN/ANTIGEN: ABNORMAL
CALCIUM SERPL-MCNC: 8.5 MG/DL (ref 8.6–10.2)
CHLORIDE BLD-SCNC: 102 MMOL/L (ref 98–107)
CO2: 21 MMOL/L (ref 22–29)
CREAT SERPL-MCNC: 2.2 MG/DL (ref 0.7–1.2)
DIGOXIN LEVEL: 1.3 NG/ML (ref 0.8–2)
GFR AFRICAN AMERICAN: 35
GFR NON-AFRICAN AMERICAN: 29 ML/MIN/1.73
GLUCOSE BLD-MCNC: 137 MG/DL (ref 74–99)
HCT VFR BLD CALC: 30 % (ref 37–54)
HEMOGLOBIN: 10 G/DL (ref 12.5–16.5)
INR BLD: 2
MAGNESIUM: 1.8 MG/DL (ref 1.6–2.6)
MCH RBC QN AUTO: 31.1 PG (ref 26–35)
MCHC RBC AUTO-ENTMCNC: 33.3 % (ref 32–34.5)
MCV RBC AUTO: 93.2 FL (ref 80–99.9)
PDW BLD-RTO: 15.4 FL (ref 11.5–15)
PLATELET # BLD: 185 E9/L (ref 130–450)
PMV BLD AUTO: 10.9 FL (ref 7–12)
POTASSIUM SERPL-SCNC: 4.3 MMOL/L (ref 3.5–5)
PROTHROMBIN TIME: 24.1 SEC (ref 9.3–12.4)
RBC # BLD: 3.22 E12/L (ref 3.8–5.8)
SODIUM BLD-SCNC: 137 MMOL/L (ref 132–146)
TOTAL PROTEIN: 5.7 G/DL (ref 6.4–8.3)
TROPONIN: 0.03 NG/ML (ref 0–0.03)
TSH SERPL DL<=0.05 MIU/L-ACNC: 2.9 UIU/ML (ref 0.27–4.2)
WBC # BLD: 13.7 E9/L (ref 4.5–11.5)

## 2019-09-24 PROCEDURE — 6370000000 HC RX 637 (ALT 250 FOR IP): Performed by: INTERNAL MEDICINE

## 2019-09-24 PROCEDURE — 85610 PROTHROMBIN TIME: CPT

## 2019-09-24 PROCEDURE — 2580000003 HC RX 258: Performed by: FAMILY MEDICINE

## 2019-09-24 PROCEDURE — 99223 1ST HOSP IP/OBS HIGH 75: CPT | Performed by: INTERNAL MEDICINE

## 2019-09-24 PROCEDURE — 84484 ASSAY OF TROPONIN QUANT: CPT

## 2019-09-24 PROCEDURE — 36415 COLL VENOUS BLD VENIPUNCTURE: CPT

## 2019-09-24 PROCEDURE — 6370000000 HC RX 637 (ALT 250 FOR IP): Performed by: FAMILY MEDICINE

## 2019-09-24 PROCEDURE — 2580000003 HC RX 258: Performed by: INTERNAL MEDICINE

## 2019-09-24 PROCEDURE — 84443 ASSAY THYROID STIM HORMONE: CPT

## 2019-09-24 PROCEDURE — 85027 COMPLETE CBC AUTOMATED: CPT

## 2019-09-24 PROCEDURE — 80053 COMPREHEN METABOLIC PANEL: CPT

## 2019-09-24 PROCEDURE — 6360000002 HC RX W HCPCS: Performed by: FAMILY MEDICINE

## 2019-09-24 PROCEDURE — 80162 ASSAY OF DIGOXIN TOTAL: CPT

## 2019-09-24 PROCEDURE — 2060000000 HC ICU INTERMEDIATE R&B

## 2019-09-24 PROCEDURE — 83735 ASSAY OF MAGNESIUM: CPT

## 2019-09-24 RX ORDER — 0.9 % SODIUM CHLORIDE 0.9 %
250 INTRAVENOUS SOLUTION INTRAVENOUS ONCE
Status: COMPLETED | OUTPATIENT
Start: 2019-09-24 | End: 2019-09-24

## 2019-09-24 RX ADMIN — Medication 250 MG: at 13:51

## 2019-09-24 RX ADMIN — SODIUM CHLORIDE 250 ML: 9 INJECTION, SOLUTION INTRAVENOUS at 15:33

## 2019-09-24 RX ADMIN — Medication 250 MG: at 17:27

## 2019-09-24 RX ADMIN — CEFTRIAXONE 1 G: 1 INJECTION, POWDER, FOR SOLUTION INTRAMUSCULAR; INTRAVENOUS at 17:27

## 2019-09-24 RX ADMIN — RANOLAZINE 500 MG: 500 TABLET, FILM COATED, EXTENDED RELEASE ORAL at 10:11

## 2019-09-24 RX ADMIN — WARFARIN SODIUM 7.5 MG: 7.5 TABLET ORAL at 17:27

## 2019-09-24 RX ADMIN — Medication 250 MG: at 23:51

## 2019-09-24 RX ADMIN — SODIUM CHLORIDE: 9 INJECTION, SOLUTION INTRAVENOUS at 15:04

## 2019-09-24 RX ADMIN — RANOLAZINE 500 MG: 500 TABLET, FILM COATED, EXTENDED RELEASE ORAL at 21:46

## 2019-09-24 RX ADMIN — LINAGLIPTIN 5 MG: 5 TABLET, FILM COATED ORAL at 13:52

## 2019-09-24 RX ADMIN — CARVEDILOL 25 MG: 25 TABLET, FILM COATED ORAL at 21:46

## 2019-09-24 RX ADMIN — ATORVASTATIN CALCIUM 20 MG: 20 TABLET, FILM COATED ORAL at 10:12

## 2019-09-24 RX ADMIN — DIGOXIN 125 MCG: 125 TABLET ORAL at 10:11

## 2019-09-24 RX ADMIN — VITAMIN D, TAB 1000IU (100/BT) 1000 UNITS: 25 TAB at 10:12

## 2019-09-24 ASSESSMENT — PAIN SCALES - GENERAL
PAINLEVEL_OUTOF10: 0

## 2019-09-24 NOTE — CONSULTS
palpebral conjunctivae. PERRL  Ears: External ears normal.  Nose/Sinuses: Nares normal. Septum midline. Mucosa normal. No sinus tenderness. Oropharynx: Oropharynx clear with no exudates seen  Neck: Neck supple. No jugular venous distension, lymphadenopathy or thyromegaly Trachea midline  Lungs: Lungs clear to auscultation bilaterally. No rhonchi, crackles or wheezes  Heart: S1 S2  Regular rate and rhythm. No rub, murmur or gallop  Abdomen: Abdomen soft, non-tender. BS normal. No masses, organomegaly  Extremities: No edema, Peripheral pulses palpable  Musculoskeletal: Muscular strength appears intact. No joint effusion, tenderness, swelling or warmth      DATA:    Labs:     Last 3 CBC:  Recent Labs     09/23/19  1438 09/24/19  0510   WBC 11.8* 13.7*   RBC 3.62* 3.22*   HGB 11.1* 10.0*    185   MPV 10.2 10.9       Last 3 BMP  Recent Labs     09/23/19  1438 09/24/19  0510    137   K 4.5 4.3   CL 98 102   CO2 25 21*   BUN 40* 39*   CREATININE 2.0* 2.2*   GLUCOSE 123* 137*   CALCIUM 9.5 8.5*       LIVER PROFILE:  Recent Labs     09/23/19  1438 09/24/19  0510   AST 16 14   ALT 17 17   LABALBU 3.8 3.2*       URINARY CATHETER OUTPUT (Rodriguez):       DRAIN/TUBE OUTPUT:     Microbiology :  No results for input(s): BC in the last 72 hours. No results for input(s): Fredderick Hammock in the last 72 hours. No results for input(s): LABURIN in the last 72 hours. No results for input(s): CULTRESP in the last 72 hours. No results for input(s): WNDABS in the last 72 hours.       Radiology :  No orders to display           Assessment and Plan:      · Mild-moderate Cdiff colitis  · Liver cirrhosis with no varices- no concern for SBP currently    Plan  - PO vancomycin 250 QID  - once improves, expect 14 days t/t    Thank you for your consult, please call for any qs                  Alesia Garcia MD

## 2019-09-25 LAB
INR BLD: 2
PROTHROMBIN TIME: 24.6 SEC (ref 9.3–12.4)

## 2019-09-25 PROCEDURE — 6360000002 HC RX W HCPCS: Performed by: FAMILY MEDICINE

## 2019-09-25 PROCEDURE — 99232 SBSQ HOSP IP/OBS MODERATE 35: CPT | Performed by: INTERNAL MEDICINE

## 2019-09-25 PROCEDURE — 6370000000 HC RX 637 (ALT 250 FOR IP): Performed by: FAMILY MEDICINE

## 2019-09-25 PROCEDURE — 85610 PROTHROMBIN TIME: CPT

## 2019-09-25 PROCEDURE — 2580000003 HC RX 258

## 2019-09-25 PROCEDURE — 36415 COLL VENOUS BLD VENIPUNCTURE: CPT

## 2019-09-25 PROCEDURE — 97161 PT EVAL LOW COMPLEX 20 MIN: CPT

## 2019-09-25 PROCEDURE — 6370000000 HC RX 637 (ALT 250 FOR IP): Performed by: INTERNAL MEDICINE

## 2019-09-25 PROCEDURE — 2580000003 HC RX 258: Performed by: FAMILY MEDICINE

## 2019-09-25 PROCEDURE — 2060000000 HC ICU INTERMEDIATE R&B

## 2019-09-25 RX ORDER — SODIUM CHLORIDE 0.9 % (FLUSH) 0.9 %
SYRINGE (ML) INJECTION
Status: COMPLETED
Start: 2019-09-25 | End: 2019-09-25

## 2019-09-25 RX ADMIN — DIGOXIN 125 MCG: 125 TABLET ORAL at 09:51

## 2019-09-25 RX ADMIN — Medication 250 MG: at 23:38

## 2019-09-25 RX ADMIN — CARVEDILOL 25 MG: 25 TABLET, FILM COATED ORAL at 09:51

## 2019-09-25 RX ADMIN — VITAMIN D, TAB 1000IU (100/BT) 1000 UNITS: 25 TAB at 09:51

## 2019-09-25 RX ADMIN — CEFTRIAXONE 1 G: 1 INJECTION, POWDER, FOR SOLUTION INTRAMUSCULAR; INTRAVENOUS at 18:10

## 2019-09-25 RX ADMIN — WARFARIN SODIUM 5 MG: 5 TABLET ORAL at 18:11

## 2019-09-25 RX ADMIN — Medication 250 MG: at 18:11

## 2019-09-25 RX ADMIN — Medication 250 MG: at 12:33

## 2019-09-25 RX ADMIN — SODIUM CHLORIDE, PRESERVATIVE FREE: 5 INJECTION INTRAVENOUS at 14:27

## 2019-09-25 RX ADMIN — LINAGLIPTIN 5 MG: 5 TABLET, FILM COATED ORAL at 09:51

## 2019-09-25 RX ADMIN — ATORVASTATIN CALCIUM 20 MG: 20 TABLET, FILM COATED ORAL at 09:51

## 2019-09-25 RX ADMIN — Medication 250 MG: at 06:13

## 2019-09-25 RX ADMIN — RANOLAZINE 500 MG: 500 TABLET, FILM COATED, EXTENDED RELEASE ORAL at 09:51

## 2019-09-25 RX ADMIN — SODIUM CHLORIDE: 9 INJECTION, SOLUTION INTRAVENOUS at 02:36

## 2019-09-25 ASSESSMENT — PAIN SCALES - GENERAL
PAINLEVEL_OUTOF10: 0

## 2019-09-26 VITALS
RESPIRATION RATE: 14 BRPM | HEIGHT: 68 IN | WEIGHT: 146 LBS | HEART RATE: 60 BPM | SYSTOLIC BLOOD PRESSURE: 95 MMHG | OXYGEN SATURATION: 99 % | TEMPERATURE: 97.8 F | BODY MASS INDEX: 22.13 KG/M2 | DIASTOLIC BLOOD PRESSURE: 50 MMHG

## 2019-09-26 LAB
ANION GAP SERPL CALCULATED.3IONS-SCNC: 13 MMOL/L (ref 7–16)
BUN BLDV-MCNC: 28 MG/DL (ref 8–23)
CALCIUM SERPL-MCNC: 8.2 MG/DL (ref 8.6–10.2)
CHLORIDE BLD-SCNC: 109 MMOL/L (ref 98–107)
CO2: 22 MMOL/L (ref 22–29)
CREAT SERPL-MCNC: 1.4 MG/DL (ref 0.7–1.2)
CULTURE, STOOL: NORMAL
GFR AFRICAN AMERICAN: 59
GFR NON-AFRICAN AMERICAN: 48 ML/MIN/1.73
GLUCOSE BLD-MCNC: 113 MG/DL (ref 74–99)
HCT VFR BLD CALC: 31.7 % (ref 37–54)
HEMOGLOBIN: 10.5 G/DL (ref 12.5–16.5)
INR BLD: 2.1
MAGNESIUM: 1.9 MG/DL (ref 1.6–2.6)
MCH RBC QN AUTO: 31.3 PG (ref 26–35)
MCHC RBC AUTO-ENTMCNC: 33.1 % (ref 32–34.5)
MCV RBC AUTO: 94.3 FL (ref 80–99.9)
PDW BLD-RTO: 15.2 FL (ref 11.5–15)
PHOSPHORUS: 2.7 MG/DL (ref 2.5–4.5)
PLATELET # BLD: 220 E9/L (ref 130–450)
PMV BLD AUTO: 10.4 FL (ref 7–12)
POTASSIUM SERPL-SCNC: 3.8 MMOL/L (ref 3.5–5)
PROTHROMBIN TIME: 25.6 SEC (ref 9.3–12.4)
RBC # BLD: 3.36 E12/L (ref 3.8–5.8)
SODIUM BLD-SCNC: 144 MMOL/L (ref 132–146)
URINE CULTURE, ROUTINE: NORMAL
WBC # BLD: 5.4 E9/L (ref 4.5–11.5)

## 2019-09-26 PROCEDURE — 36415 COLL VENOUS BLD VENIPUNCTURE: CPT

## 2019-09-26 PROCEDURE — 85027 COMPLETE CBC AUTOMATED: CPT

## 2019-09-26 PROCEDURE — APPSS45 APP SPLIT SHARED TIME 31-45 MINUTES: Performed by: PHYSICIAN ASSISTANT

## 2019-09-26 PROCEDURE — 80048 BASIC METABOLIC PNL TOTAL CA: CPT

## 2019-09-26 PROCEDURE — APPSS45 APP SPLIT SHARED TIME 31-45 MINUTES: Performed by: NURSE PRACTITIONER

## 2019-09-26 PROCEDURE — 6370000000 HC RX 637 (ALT 250 FOR IP): Performed by: INTERNAL MEDICINE

## 2019-09-26 PROCEDURE — 85610 PROTHROMBIN TIME: CPT

## 2019-09-26 PROCEDURE — 83735 ASSAY OF MAGNESIUM: CPT

## 2019-09-26 PROCEDURE — 99239 HOSP IP/OBS DSCHRG MGMT >30: CPT | Performed by: INTERNAL MEDICINE

## 2019-09-26 PROCEDURE — 6370000000 HC RX 637 (ALT 250 FOR IP): Performed by: FAMILY MEDICINE

## 2019-09-26 PROCEDURE — 84100 ASSAY OF PHOSPHORUS: CPT

## 2019-09-26 RX ORDER — VANCOMYCIN HYDROCHLORIDE 250 MG/1
250 CAPSULE ORAL 4 TIMES DAILY
Qty: 40 CAPSULE | Refills: 0 | Status: SHIPPED | OUTPATIENT
Start: 2019-09-26 | End: 2019-10-06

## 2019-09-26 RX ORDER — CARVEDILOL 12.5 MG/1
12.5 TABLET ORAL 2 TIMES DAILY
Qty: 60 TABLET | Refills: 0 | Status: ON HOLD | OUTPATIENT
Start: 2019-09-26 | End: 2020-01-01 | Stop reason: HOSPADM

## 2019-09-26 RX ORDER — CARVEDILOL 6.25 MG/1
12.5 TABLET ORAL 2 TIMES DAILY
Status: DISCONTINUED | OUTPATIENT
Start: 2019-09-26 | End: 2019-09-26 | Stop reason: HOSPADM

## 2019-09-26 RX ADMIN — Medication 250 MG: at 12:48

## 2019-09-26 RX ADMIN — VITAMIN D, TAB 1000IU (100/BT) 1000 UNITS: 25 TAB at 08:59

## 2019-09-26 RX ADMIN — RANOLAZINE 500 MG: 500 TABLET, FILM COATED, EXTENDED RELEASE ORAL at 08:59

## 2019-09-26 RX ADMIN — ATORVASTATIN CALCIUM 20 MG: 20 TABLET, FILM COATED ORAL at 08:59

## 2019-09-26 RX ADMIN — LINAGLIPTIN 5 MG: 5 TABLET, FILM COATED ORAL at 08:59

## 2019-09-26 RX ADMIN — CARVEDILOL 25 MG: 25 TABLET, FILM COATED ORAL at 08:59

## 2019-09-26 RX ADMIN — DIGOXIN 125 MCG: 125 TABLET ORAL at 08:59

## 2019-09-26 RX ADMIN — Medication 250 MG: at 06:00

## 2019-09-26 ASSESSMENT — PAIN SCALES - GENERAL: PAINLEVEL_OUTOF10: 0

## 2019-09-26 NOTE — DISCHARGE SUMMARY
[facility] Physician Discharge Summary       [place], [address]  [facility] [place] [id number]    Schedule an appointment as soon as possible for a visit in 1 week  Make an appointment in 1 week to go over hospitalization, medications, and follow up. Josesito Elias [address] [facility] [address]  [phone]    In 1 week  Cardiology    Jamar Jones MD  [address] [place] [address]  [phone]    Schedule an appointment as soon as possible for a visit in 2 weeks  Infectious Disease      Activity level: as tolerated     Diet: DIET CARDIAC; Dispo: home     Condition on discharge: stable    Patient ID:  Rodney Navarrete  [id number]  82 y.o.  1935    Admit date: 9/23/2019    Discharge date and time:  9/26/2019  6:16 PM    Admission Diagnoses: Active Problems:    Sepsis ([address]  Resolved Problems:    * No resolved hospital problems. *      Discharge Diagnoses: Active Problems:    Sepsis ([address]  Resolved Problems:    * No resolved hospital problems. *      Consults:  IP CONSULT TO INFECTIOUS DISEASES  IP CONSULT TO CARDIOLOGY    Procedures: none    Hospital Course:   Patient Rodney Navarrete. is a 82 y.o. presented with Sepsis [facility]) [A41.9]  Patient presented to the ER with complaints of diarrhea and weakness. He was sent in from PCP office. Patient was admitted to telemetry. ID and Cardiology were consulted. Patient was followed and treated for;    1. + c-diff colitis: ID was followed. He was rehydrated. He was started on po vanco and continued on at discharge. His diuretic and entresto were placed on hold to not worsen dehydration. He is to follow up with cardiology in 1 week to possibly resume. 2. Dehydration/ HELENA: associated to above; resolving with hydration  3. Sepsis: patient met sepsis criteria on admission with fever and leukocytosis associated to c-diff  4. Hypotension: resolved with hydration  5. Mitchell Merlyn [phone] [address]/ [facility] [address]    Phone:  [phone]   · carvedilol 12.5 MG tablet  · vancomycin 250 MG capsule           Note that more than 30 minutes was spent in preparing discharge papers, discussing discharge with patient, medication review, etc.    Signed:  Electronically signed by SWETHA Shabazz on 9/26/2019 at [address] NOTE 9/26/2019 2000PM:    Details of the evaluation - subjective assessment (including medication profile, past medical, family and social history when applicable), examination, review of lab and test data, diagnostic impressions and medical decision making - performed by SWETHA Shabazz, were discussed with me on the date of service and I agree with clinical information herein unless otherwise noted. The patient has been evaluated by me personally prior to discharge. Pt reports no fevers, chills,n/v.     Exam: heart reg at rate of 70,lungs cta, abd pos bs soft nt, ext neg for le edema    I agree with the assessment and plan of SWETHA Shabazz. C. Diff colitis  Atrial fib  Hypotension  Acidosis  rk on ckd  Sepsis poa      Total time for discharge is 37 min    Electronically signed by Alfredo Myers D.O.   Hospitalist  4M Hospitalist Service at [facility]

## 2019-09-26 NOTE — PROGRESS NOTES
Called Dr Maryann Caputo for admission orders, orders placed.
Consult for cardiology made to  [facility], Dr. Shani Bal on, message left via secure text.
ID Progress Note                [address] [facility] [address], [address], [address]            Phone [phone]     Fax [phone]      Chief complaint   Loose stools  Subjective:  Less loose stools  The patient is awake and alert. Tolerating medications. Reports no side effects. Afebrile. 10 ROS otherwise negative unless otherwise specified above. Objective:    Vitals:    09/26/19 0853   BP: (!) 109/58   Pulse: 74   Resp: 16   Temp: 98.6 °F (37 °C)   SpO2: 99%     General appearance: Alert, Awake, Oriented times 3, no distress  Skin: Warm and dry  Eyes: Pink palpebral conjunctivae. PERRL  Ears: External ears normal.  Nose/Sinuses: Nares normal. Septum midline. Mucosa normal. No sinus tenderness. Oropharynx: Oropharynx clear with no exudates seen  Neck: Neck supple. No jugular venous distension, lymphadenopathy or thyromegaly Trachea midline  Lungs: Lungs clear to auscultation bilaterally. No rhonchi, crackles or wheezes  Heart: S1 S2  Regular rate and rhythm. No rub, murmur or gallop  Abdomen: Abdomen soft, non-tender. BS normal. No masses, organomegaly  Extremities: No edema, Peripheral pulses palpable  Musculoskeletal: Muscular strength appears intact.  No joint effusion, tenderness, swelling or warmth    Labs:  Recent Labs     09/23/19  1438 09/24/19  0510   WBC 11.8* 13.7*   RBC 3.62* 3.22*   HGB 11.1* 10.0*   HCT 33.5* 30.0*   MCV 92.5 93.2   MCH 30.7 31.1   MCHC 33.1 33.3   RDW 15.3* 15.4*    185   MPV 10.2 10.9     CMP:    Lab Results   Component Value Date     09/24/2019    K 4.3 09/24/2019    K 4.5 09/23/2019     09/24/2019    CO2 21 09/24/2019    BUN 39 09/24/2019    CREATININE 2.2 09/24/2019    GFRAA 35 09/24/2019    LABGLOM 29 09/24/2019    GLUCOSE 137 09/24/2019    GLUCOSE 110 05/30/2012    PROT 5.7 09/24/2019    LABALBU 3.2 09/24/2019    LABALBU 4.2 05/30/2012    CALCIUM 8.5 09/24/2019    BILITOT 1.4 09/24/2019    ALKPHOS 101 09/24/2019    AST 14 09/24/2019
IV leaking/bleeding. Fluids stopped. Pt is refusing placement of new IV until physicians round and say if he is discharged.
Inpatient Cardiology Progress note     PATIENT IS BEING FOLLOWED FOR: Afib, hypotension    Star Valencia. is a 82 y.o. male known to Dr. Michelene Goldberg: Patient resting comfortably in bed. Reports mild fatigue and lethargy. Denies any current chest pain, palpitations, or significant SOB. He denies any current dizziness. OBJECTIVE: No apparent distress     ROS:  Consist: Denies fevers, chills or night sweats  Heart: Denies chest pain, palpitations, lightheadedness, dizziness or syncope  Lungs: Denies SOB, cough, wheezing, orthopnea or PND  GI: Denies abdominal pain, vomiting + diarrhea    PHYSICAL EXAM:   BP (!) 109/58 Comment: manual  Pulse 74   Temp 98.6 °F (37 °C)   Resp 16   Ht 5' 8\" (1.727 m)   Wt 146 lb (66.2 kg)   SpO2 99%   BMI 22.20 kg/m²    B/P Range last 24 hours: Systolic (97ENC), SGF:20 , Min:84 , RVD:519    Diastolic (20EHX), VAY:27, Min:41, Max:58    CONST: Well developed, well nourished who appears stated age. Awake, alert and cooperative. No apparent distress  HEENT:   Head- Normocephalic, atraumatic   Eyes- Conjunctivae pink, anicteric  Throat- Oral mucosa pink and moist  Neck-  No stridor, trachea midline, no jugular venous distention. No carotid bruit  CHEST: Chest symmetrical and non-tender to palpation. No accessory muscle use or intercostal retractions  RESPIRATORY:  Lung sounds - diminished BS noted bilaterally  CARDIOVASCULAR:     Heart Inspection- shows no noted pulsations  Heart Palpation- no heaves or thrills; PMI is non-displaced   Heart Ausculation- irregular rate and rhythm,1/6 FRANKO noted  PV: No lower extremity edema. No varicosities. Pedal pulses palpable, no clubbing or cyanosis   ABDOMEN: Soft, non-tender to light palpation. Bowel sounds present. No palpable masses no organomegaly; no abdominal bruit  MS: No atrophy or abnormal movements. : Deferred  SKIN: Warm and dry no statis dermatitis or ulcers   NEURO / PSYCH: Oriented to person, place and time.
09/24/2019    ALT 17 09/24/2019          Microbiology :  Recent Labs     09/23/19  1415   BC 24 Hours- no growth     Recent Labs     09/23/19  1430   BLOODCULT2 24 Hours- no growth     No results for input(s): Eric Grout in the last 72 hours. No results for input(s): CULTRESP in the last 72 hours. No results for input(s): WNDABS in the last 72 hours.     Radiology :  No orders to display       Assessment and Plan:      · Mild-moderate Cdiff colitis  · Liver cirrhosis with no varices- no concern for SBP currently     Plan  - PO vancomycin 250 QID  - once improves, expect 14 days t/t  - ok for D/C from ID standpoint  - clinically improving        Electronically signed by Carla Pham MD on 9/25/2019 at 12:30 PM

## 2019-09-27 ENCOUNTER — CARE COORDINATION (OUTPATIENT)
Dept: CASE MANAGEMENT | Age: 84
End: 2019-09-27

## 2019-09-27 ENCOUNTER — TELEPHONE (OUTPATIENT)
Dept: CARDIOLOGY CLINIC | Age: 84
End: 2019-09-27

## 2019-09-27 DIAGNOSIS — A41.9 SEPSIS, DUE TO UNSPECIFIED ORGANISM: Primary | ICD-10-CM

## 2019-09-27 PROCEDURE — 1111F DSCHRG MED/CURRENT MED MERGE: CPT | Performed by: FAMILY MEDICINE

## 2019-09-28 LAB
BLOOD CULTURE, ROUTINE: NORMAL
CULTURE, BLOOD 2: NORMAL

## 2019-09-30 ENCOUNTER — CARE COORDINATION (OUTPATIENT)
Dept: CASE MANAGEMENT | Age: 84
End: 2019-09-30

## 2019-10-02 ENCOUNTER — OFFICE VISIT (OUTPATIENT)
Dept: CARDIOLOGY CLINIC | Age: 84
End: 2019-10-02
Payer: MEDICARE

## 2019-10-02 ENCOUNTER — CARE COORDINATION (OUTPATIENT)
Dept: CASE MANAGEMENT | Age: 84
End: 2019-10-02

## 2019-10-02 VITALS
DIASTOLIC BLOOD PRESSURE: 70 MMHG | WEIGHT: 162 LBS | HEIGHT: 68 IN | SYSTOLIC BLOOD PRESSURE: 110 MMHG | BODY MASS INDEX: 24.55 KG/M2 | HEART RATE: 62 BPM | RESPIRATION RATE: 14 BRPM

## 2019-10-02 DIAGNOSIS — I25.10 CORONARY ARTERY DISEASE INVOLVING NATIVE CORONARY ARTERY OF NATIVE HEART WITHOUT ANGINA PECTORIS: Primary | Chronic | ICD-10-CM

## 2019-10-02 PROCEDURE — 93000 ELECTROCARDIOGRAM COMPLETE: CPT | Performed by: INTERNAL MEDICINE

## 2019-10-02 PROCEDURE — G8484 FLU IMMUNIZE NO ADMIN: HCPCS | Performed by: INTERNAL MEDICINE

## 2019-10-02 PROCEDURE — 1123F ACP DISCUSS/DSCN MKR DOCD: CPT | Performed by: INTERNAL MEDICINE

## 2019-10-02 PROCEDURE — 1111F DSCHRG MED/CURRENT MED MERGE: CPT | Performed by: INTERNAL MEDICINE

## 2019-10-02 PROCEDURE — 99213 OFFICE O/P EST LOW 20 MIN: CPT | Performed by: INTERNAL MEDICINE

## 2019-10-02 PROCEDURE — G8420 CALC BMI NORM PARAMETERS: HCPCS | Performed by: INTERNAL MEDICINE

## 2019-10-02 PROCEDURE — G8427 DOCREV CUR MEDS BY ELIG CLIN: HCPCS | Performed by: INTERNAL MEDICINE

## 2019-10-02 PROCEDURE — 4040F PNEUMOC VAC/ADMIN/RCVD: CPT | Performed by: INTERNAL MEDICINE

## 2019-10-02 PROCEDURE — 1036F TOBACCO NON-USER: CPT | Performed by: INTERNAL MEDICINE

## 2019-10-02 PROCEDURE — G8598 ASA/ANTIPLAT THER USED: HCPCS | Performed by: INTERNAL MEDICINE

## 2019-10-02 RX ORDER — LISINOPRIL 5 MG/1
5 TABLET ORAL DAILY
Qty: 30 TABLET | Refills: 5 | Status: SHIPPED | OUTPATIENT
Start: 2019-10-02 | End: 2019-01-01 | Stop reason: ALTCHOICE

## 2019-10-04 RX ORDER — GLUCOSAMINE HCL/CHONDROITIN SU 500-400 MG
CAPSULE ORAL
Qty: 100 STRIP | Refills: 3 | Status: SHIPPED | OUTPATIENT
Start: 2019-10-04

## 2019-10-04 RX ORDER — SYRING-NEEDL,DISP,INSUL,0.3 ML 30 GX5/16"
SYRINGE, EMPTY DISPOSABLE MISCELLANEOUS
Qty: 100 DEVICE | Refills: 3 | Status: SHIPPED | OUTPATIENT
Start: 2019-10-04

## 2020-01-01 ENCOUNTER — HOSPITAL ENCOUNTER (OUTPATIENT)
Age: 85
Discharge: HOME | End: 2020-02-18
Payer: MEDICARE

## 2020-01-01 ENCOUNTER — OFFICE VISIT (OUTPATIENT)
Dept: CARDIOLOGY CLINIC | Age: 85
End: 2020-01-01
Payer: MEDICARE

## 2020-01-01 ENCOUNTER — HOSPITAL ENCOUNTER (OUTPATIENT)
Age: 85
Discharge: HOME | End: 2020-03-12
Payer: MEDICARE

## 2020-01-01 ENCOUNTER — HOSPITAL ENCOUNTER (INPATIENT)
Age: 85
LOS: 5 days | Discharge: HOME | DRG: 371 | End: 2020-01-10
Attending: EMERGENCY MEDICINE | Admitting: FAMILY MEDICINE
Payer: MEDICARE

## 2020-01-01 ENCOUNTER — TELEPHONE (OUTPATIENT)
Dept: HEMATOLOGY | Age: 85
End: 2020-01-01

## 2020-01-01 ENCOUNTER — OFFICE VISIT (OUTPATIENT)
Dept: HEMATOLOGY | Age: 85
End: 2020-01-01
Payer: MEDICARE

## 2020-01-01 ENCOUNTER — CARE COORDINATION (OUTPATIENT)
Dept: CASE MANAGEMENT | Age: 85
End: 2020-01-01

## 2020-01-01 ENCOUNTER — TELEPHONE (OUTPATIENT)
Dept: CARDIOLOGY CLINIC | Age: 85
End: 2020-01-01

## 2020-01-01 ENCOUNTER — HOSPITAL ENCOUNTER (OUTPATIENT)
Age: 85
Discharge: HOME | End: 2020-02-27
Payer: MEDICARE

## 2020-01-01 ENCOUNTER — APPOINTMENT (OUTPATIENT)
Dept: ULTRASOUND IMAGING | Age: 85
DRG: 371 | End: 2020-01-01
Payer: MEDICARE

## 2020-01-01 ENCOUNTER — VIRTUAL VISIT (OUTPATIENT)
Dept: CARDIOLOGY CLINIC | Age: 85
End: 2020-01-01
Payer: MEDICARE

## 2020-01-01 ENCOUNTER — APPOINTMENT (OUTPATIENT)
Dept: GENERAL RADIOLOGY | Age: 85
DRG: 371 | End: 2020-01-01
Payer: MEDICARE

## 2020-01-01 ENCOUNTER — APPOINTMENT (OUTPATIENT)
Dept: CT IMAGING | Age: 85
DRG: 371 | End: 2020-01-01
Payer: MEDICARE

## 2020-01-01 ENCOUNTER — HOSPITAL ENCOUNTER (OUTPATIENT)
Dept: ULTRASOUND IMAGING | Age: 85
Discharge: HOME | End: 2020-03-06
Payer: MEDICARE

## 2020-01-01 VITALS
WEIGHT: 164 LBS | BODY MASS INDEX: 24.29 KG/M2 | HEART RATE: 75 BPM | RESPIRATION RATE: 16 BRPM | SYSTOLIC BLOOD PRESSURE: 92 MMHG | HEIGHT: 69 IN | DIASTOLIC BLOOD PRESSURE: 60 MMHG

## 2020-01-01 VITALS
OXYGEN SATURATION: 96 % | HEIGHT: 69 IN | RESPIRATION RATE: 18 BRPM | TEMPERATURE: 97.8 F | DIASTOLIC BLOOD PRESSURE: 50 MMHG | SYSTOLIC BLOOD PRESSURE: 88 MMHG | WEIGHT: 145.4 LBS | BODY MASS INDEX: 21.53 KG/M2 | HEART RATE: 70 BPM

## 2020-01-01 VITALS
BODY MASS INDEX: 25.49 KG/M2 | WEIGHT: 168.2 LBS | DIASTOLIC BLOOD PRESSURE: 68 MMHG | RESPIRATION RATE: 16 BRPM | HEART RATE: 80 BPM | SYSTOLIC BLOOD PRESSURE: 120 MMHG | HEIGHT: 68 IN

## 2020-01-01 VITALS
HEIGHT: 69 IN | WEIGHT: 151.8 LBS | RESPIRATION RATE: 16 BRPM | HEART RATE: 73 BPM | DIASTOLIC BLOOD PRESSURE: 60 MMHG | BODY MASS INDEX: 22.48 KG/M2 | SYSTOLIC BLOOD PRESSURE: 108 MMHG

## 2020-01-01 VITALS
HEART RATE: 81 BPM | RESPIRATION RATE: 16 BRPM | HEIGHT: 68 IN | DIASTOLIC BLOOD PRESSURE: 60 MMHG | BODY MASS INDEX: 26.13 KG/M2 | SYSTOLIC BLOOD PRESSURE: 98 MMHG | WEIGHT: 172.4 LBS

## 2020-01-01 VITALS
BODY MASS INDEX: 23.07 KG/M2 | HEART RATE: 84 BPM | WEIGHT: 147 LBS | SYSTOLIC BLOOD PRESSURE: 100 MMHG | HEIGHT: 67 IN | DIASTOLIC BLOOD PRESSURE: 54 MMHG

## 2020-01-01 VITALS
WEIGHT: 167.8 LBS | SYSTOLIC BLOOD PRESSURE: 108 MMHG | RESPIRATION RATE: 16 BRPM | BODY MASS INDEX: 25.43 KG/M2 | HEART RATE: 98 BPM | HEIGHT: 68 IN | DIASTOLIC BLOOD PRESSURE: 68 MMHG

## 2020-01-01 VITALS
HEART RATE: 97 BPM | RESPIRATION RATE: 18 BRPM | DIASTOLIC BLOOD PRESSURE: 60 MMHG | BODY MASS INDEX: 24.28 KG/M2 | WEIGHT: 160.2 LBS | HEIGHT: 68 IN | SYSTOLIC BLOOD PRESSURE: 108 MMHG

## 2020-01-01 LAB
24HR URINE VOLUME (ML): 725 ML
ALBUMIN SERPL-MCNC: 2.6 G/DL (ref 3.5–5.2)
ALBUMIN SERPL-MCNC: 2.8 G/DL (ref 3.5–5.2)
ALBUMIN SERPL-MCNC: 2.8 G/DL (ref 3.5–5.2)
ALBUMIN SERPL-MCNC: 2.9 G/DL (ref 3.5–5.2)
ALBUMIN SERPL-MCNC: 3 G/DL (ref 3.5–5.2)
ALBUMIN SERPL-MCNC: 3.4 G/DL (ref 3.5–5.2)
ALP BLD-CCNC: 61 U/L (ref 40–129)
ALP BLD-CCNC: 65 U/L (ref 40–129)
ALP BLD-CCNC: 74 U/L (ref 40–129)
ALP BLD-CCNC: 88 U/L (ref 40–129)
ALPHA-1 ANTITRYPSIN: 225 MG/DL (ref 90–200)
ALT SERPL-CCNC: 17 U/L (ref 0–40)
ALT SERPL-CCNC: 18 U/L (ref 0–40)
ALT SERPL-CCNC: 22 U/L (ref 0–40)
AMMONIA: 11.2 UMOL/L (ref 16–60)
AMMONIA: 17.2 UMOL/L (ref 16–60)
ANION GAP SERPL CALCULATED.3IONS-SCNC: 11 MMOL/L (ref 7–16)
ANION GAP SERPL CALCULATED.3IONS-SCNC: 12 MMOL/L (ref 7–16)
ANION GAP SERPL CALCULATED.3IONS-SCNC: 14 MMOL/L (ref 7–16)
ANION GAP SERPL CALCULATED.3IONS-SCNC: 15 MMOL/L (ref 7–16)
ANION GAP SERPL CALCULATED.3IONS-SCNC: 15 MMOL/L (ref 7–16)
ANION GAP SERPL CALCULATED.3IONS-SCNC: 17 MMOL/L (ref 7–16)
APPEARANCE FLUID: NORMAL
APTT: 36.4 SEC (ref 24.5–35.1)
AST SERPL-CCNC: 26 U/L (ref 0–39)
AST SERPL-CCNC: 28 U/L (ref 0–39)
AST SERPL-CCNC: 28 U/L (ref 0–39)
AST SERPL-CCNC: 29 U/L (ref 0–39)
BASOPHILS ABSOLUTE: 0.01 E9/L (ref 0–0.2)
BASOPHILS ABSOLUTE: 0.02 E9/L (ref 0–0.2)
BASOPHILS ABSOLUTE: 0.05 E9/L (ref 0–0.2)
BASOPHILS RELATIVE PERCENT: 0.1 % (ref 0–2)
BASOPHILS RELATIVE PERCENT: 0.1 % (ref 0–2)
BASOPHILS RELATIVE PERCENT: 0.2 % (ref 0–2)
BILIRUB SERPL-MCNC: 1.3 MG/DL (ref 0–1.2)
BILIRUB SERPL-MCNC: 1.6 MG/DL (ref 0–1.2)
BILIRUB SERPL-MCNC: 1.7 MG/DL (ref 0–1.2)
BILIRUB SERPL-MCNC: 1.9 MG/DL (ref 0–1.2)
BILIRUB SERPL-MCNC: 2 MG/DL (ref 0–1.2)
BILIRUB SERPL-MCNC: 3.2 MG/DL (ref 0–1.2)
BILIRUBIN URINE: ABNORMAL
BLOOD CULTURE, ROUTINE: NORMAL
BLOOD, URINE: NEGATIVE
BODY FLUID CULTURE, STERILE: ABNORMAL
BUN BLDV-MCNC: 33 MG/DL (ref 8–23)
BUN BLDV-MCNC: 35 MG/DL (ref 8–23)
BUN BLDV-MCNC: 40 MG/DL (ref 8–23)
BUN BLDV-MCNC: 51 MG/DL (ref 8–23)
BUN BLDV-MCNC: 51 MG/DL (ref 8–23)
BUN BLDV-MCNC: 53 MG/DL (ref 8–23)
BUN BLDV-MCNC: 57 MG/DL (ref 8–23)
BUN BLDV-MCNC: 58 MG/DL (ref 8–23)
BUN BLDV-MCNC: 67 MG/DL (ref 8–23)
CALCIUM SERPL-MCNC: 8.1 MG/DL (ref 8.6–10.2)
CALCIUM SERPL-MCNC: 8.2 MG/DL (ref 8.6–10.2)
CALCIUM SERPL-MCNC: 8.5 MG/DL (ref 8.6–10.2)
CALCIUM SERPL-MCNC: 8.6 MG/DL (ref 8.6–10.2)
CALCIUM SERPL-MCNC: 8.6 MG/DL (ref 8.6–10.2)
CALCIUM SERPL-MCNC: 8.8 MG/DL (ref 8.6–10.2)
CALCIUM SERPL-MCNC: 9.2 MG/DL (ref 8.6–10.2)
CALCIUM SERPL-MCNC: 9.4 MG/DL (ref 8.6–10.2)
CALCIUM SERPL-MCNC: 9.6 MG/DL (ref 8.6–10.2)
CELL COUNT FLUID TYPE: NORMAL
CHLORIDE BLD-SCNC: 101 MMOL/L (ref 98–107)
CHLORIDE BLD-SCNC: 101 MMOL/L (ref 98–107)
CHLORIDE BLD-SCNC: 105 MMOL/L (ref 98–107)
CHLORIDE BLD-SCNC: 95 MMOL/L (ref 98–107)
CHLORIDE BLD-SCNC: 98 MMOL/L (ref 98–107)
CHLORIDE BLD-SCNC: 99 MMOL/L (ref 98–107)
CLARITY: CLEAR
CO2: 23 MMOL/L (ref 22–29)
CO2: 24 MMOL/L (ref 22–29)
CO2: 26 MMOL/L (ref 22–29)
CO2: 26 MMOL/L (ref 22–29)
COLOR FLUID: YELLOW
COLOR: ABNORMAL
CREAT SERPL-MCNC: 1.3 MG/DL (ref 0.7–1.2)
CREAT SERPL-MCNC: 1.3 MG/DL (ref 0.7–1.2)
CREAT SERPL-MCNC: 1.5 MG/DL (ref 0.7–1.2)
CREAT SERPL-MCNC: 1.5 MG/DL (ref 0.7–1.2)
CREAT SERPL-MCNC: 1.6 MG/DL (ref 0.7–1.2)
CREAT SERPL-MCNC: 1.7 MG/DL (ref 0.7–1.2)
CREAT SERPL-MCNC: 2 MG/DL (ref 0.7–1.2)
CREATININE 24 HOUR URINE: 602 MG/24H (ref 980–2200)
CULTURE, BLOOD 2: NORMAL
EKG ATRIAL RATE: 55 BPM
EKG Q-T INTERVAL: 344 MS
EKG QRS DURATION: 110 MS
EKG QTC CALCULATION (BAZETT): 404 MS
EKG R AXIS: -45 DEGREES
EKG T AXIS: 76 DEGREES
EKG VENTRICULAR RATE: 83 BPM
EOSINOPHILS ABSOLUTE: 0 E9/L (ref 0.05–0.5)
EOSINOPHILS ABSOLUTE: 0.04 E9/L (ref 0.05–0.5)
EOSINOPHILS ABSOLUTE: 0.04 E9/L (ref 0.05–0.5)
EOSINOPHILS RELATIVE PERCENT: 0 % (ref 0–6)
EOSINOPHILS RELATIVE PERCENT: 0.2 % (ref 0–6)
EOSINOPHILS RELATIVE PERCENT: 0.3 % (ref 0–6)
FERRITIN: 588 NG/ML
FLUID TYPE: NORMAL
GFR AFRICAN AMERICAN: 39
GFR AFRICAN AMERICAN: 47
GFR AFRICAN AMERICAN: 50
GFR AFRICAN AMERICAN: 54
GFR AFRICAN AMERICAN: 54
GFR AFRICAN AMERICAN: >60
GFR AFRICAN AMERICAN: >60
GFR NON-AFRICAN AMERICAN: 32 ML/MIN/1.73
GFR NON-AFRICAN AMERICAN: 39 ML/MIN/1.73
GFR NON-AFRICAN AMERICAN: 42 ML/MIN/1.73
GFR NON-AFRICAN AMERICAN: 45 ML/MIN/1.73
GFR NON-AFRICAN AMERICAN: 45 ML/MIN/1.73
GFR NON-AFRICAN AMERICAN: 53 ML/MIN/1.73
GFR NON-AFRICAN AMERICAN: 53 ML/MIN/1.73
GLUCOSE BLD-MCNC: 109 MG/DL (ref 74–99)
GLUCOSE BLD-MCNC: 111 MG/DL (ref 74–99)
GLUCOSE BLD-MCNC: 114 MG/DL (ref 74–99)
GLUCOSE BLD-MCNC: 114 MG/DL (ref 74–99)
GLUCOSE BLD-MCNC: 115 MG/DL (ref 74–99)
GLUCOSE BLD-MCNC: 128 MG/DL (ref 74–99)
GLUCOSE BLD-MCNC: 130 MG/DL (ref 74–99)
GLUCOSE BLD-MCNC: 155 MG/DL (ref 74–99)
GLUCOSE BLD-MCNC: 155 MG/DL (ref 74–99)
GLUCOSE URINE: NEGATIVE MG/DL
GRAM STAIN ORDERABLE: NORMAL
GRAM STAIN RESULT: ABNORMAL
HCT VFR BLD CALC: 25.9 % (ref 37–54)
HCT VFR BLD CALC: 27.6 % (ref 37–54)
HCT VFR BLD CALC: 28.4 % (ref 37–54)
HCT VFR BLD CALC: 29.1 % (ref 37–54)
HCT VFR BLD CALC: 30.5 % (ref 37–54)
HCT VFR BLD CALC: 36.6 % (ref 37–54)
HEMOGLOBIN: 10.2 G/DL (ref 12.5–16.5)
HEMOGLOBIN: 10.4 G/DL (ref 12.5–16.5)
HEMOGLOBIN: 12.6 G/DL (ref 12.5–16.5)
HEMOGLOBIN: 8.9 G/DL (ref 12.5–16.5)
HEMOGLOBIN: 9.4 G/DL (ref 12.5–16.5)
HEMOGLOBIN: 9.6 G/DL (ref 12.5–16.5)
IMMATURE GRANULOCYTES #: 0.17 E9/L
IMMATURE GRANULOCYTES #: 0.17 E9/L
IMMATURE GRANULOCYTES #: 0.23 E9/L
IMMATURE GRANULOCYTES %: 1.1 % (ref 0–5)
INFLUENZA A BY PCR: NOT DETECTED
INFLUENZA B BY PCR: NOT DETECTED
INR BLD: 1.6
INR BLD: 1.7
INR BLD: 1.8
INR BLD: 2
INR BLD: 2.2
INR BLD: 2.3
KETONES, URINE: NEGATIVE MG/DL
LD, FLUID: 938 U/L
LEUKOCYTE ESTERASE, URINE: NEGATIVE
LYMPHOCYTES ABSOLUTE: 0.97 E9/L (ref 1.5–4)
LYMPHOCYTES ABSOLUTE: 0.98 E9/L (ref 1.5–4)
LYMPHOCYTES ABSOLUTE: 1 E9/L (ref 1.5–4)
LYMPHOCYTES RELATIVE PERCENT: 4.6 % (ref 20–42)
LYMPHOCYTES RELATIVE PERCENT: 6.1 % (ref 20–42)
LYMPHOCYTES RELATIVE PERCENT: 6.5 % (ref 20–42)
Lab: 24 HOURS
MCH RBC QN AUTO: 29.4 PG (ref 26–35)
MCH RBC QN AUTO: 29.7 PG (ref 26–35)
MCH RBC QN AUTO: 29.8 PG (ref 26–35)
MCH RBC QN AUTO: 29.9 PG (ref 26–35)
MCH RBC QN AUTO: 30.1 PG (ref 26–35)
MCH RBC QN AUTO: 30.4 PG (ref 26–35)
MCHC RBC AUTO-ENTMCNC: 33.8 % (ref 32–34.5)
MCHC RBC AUTO-ENTMCNC: 34.1 % (ref 32–34.5)
MCHC RBC AUTO-ENTMCNC: 34.1 % (ref 32–34.5)
MCHC RBC AUTO-ENTMCNC: 34.4 % (ref 32–34.5)
MCHC RBC AUTO-ENTMCNC: 34.4 % (ref 32–34.5)
MCHC RBC AUTO-ENTMCNC: 35.1 % (ref 32–34.5)
MCV RBC AUTO: 86.3 FL (ref 80–99.9)
MCV RBC AUTO: 86.6 FL (ref 80–99.9)
MCV RBC AUTO: 87.1 FL (ref 80–99.9)
MCV RBC AUTO: 87.4 FL (ref 80–99.9)
MCV RBC AUTO: 87.6 FL (ref 80–99.9)
MCV RBC AUTO: 87.9 FL (ref 80–99.9)
MONOCYTE, FLUID: 22 %
MONOCYTES ABSOLUTE: 1.3 E9/L (ref 0.1–0.95)
MONOCYTES ABSOLUTE: 1.33 E9/L (ref 0.1–0.95)
MONOCYTES ABSOLUTE: 1.35 E9/L (ref 0.1–0.95)
MONOCYTES RELATIVE PERCENT: 6.5 % (ref 2–12)
MONOCYTES RELATIVE PERCENT: 8.3 % (ref 2–12)
MONOCYTES RELATIVE PERCENT: 8.5 % (ref 2–12)
NEUTROPHIL, FLUID: 78 %
NEUTROPHILS ABSOLUTE: 12.79 E9/L (ref 1.8–7.3)
NEUTROPHILS ABSOLUTE: 13.54 E9/L (ref 1.8–7.3)
NEUTROPHILS ABSOLUTE: 18.31 E9/L (ref 1.8–7.3)
NEUTROPHILS RELATIVE PERCENT: 83.5 % (ref 43–80)
NEUTROPHILS RELATIVE PERCENT: 84.2 % (ref 43–80)
NEUTROPHILS RELATIVE PERCENT: 87.6 % (ref 43–80)
NITRITE, URINE: NEGATIVE
NUCLEATED CELLS FLUID: NORMAL /UL
ORGANISM: ABNORMAL
PDW BLD-RTO: 13.6 FL (ref 11.5–15)
PDW BLD-RTO: 13.7 FL (ref 11.5–15)
PDW BLD-RTO: 13.8 FL (ref 11.5–15)
PDW BLD-RTO: 13.8 FL (ref 11.5–15)
PH UA: 5 (ref 5–9)
PLATELET # BLD: 261 E9/L (ref 130–450)
PLATELET # BLD: 271 E9/L (ref 130–450)
PLATELET # BLD: 302 E9/L (ref 130–450)
PLATELET # BLD: 302 E9/L (ref 130–450)
PLATELET # BLD: 322 E9/L (ref 130–450)
PLATELET # BLD: 327 E9/L (ref 130–450)
PMV BLD AUTO: 10.2 FL (ref 7–12)
PMV BLD AUTO: 10.4 FL (ref 7–12)
PMV BLD AUTO: 10.5 FL (ref 7–12)
PMV BLD AUTO: 10.5 FL (ref 7–12)
PMV BLD AUTO: 10.7 FL (ref 7–12)
PMV BLD AUTO: 10.8 FL (ref 7–12)
POTASSIUM SERPL-SCNC: 3.4 MMOL/L (ref 3.5–5)
POTASSIUM SERPL-SCNC: 3.6 MMOL/L (ref 3.5–5)
POTASSIUM SERPL-SCNC: 3.6 MMOL/L (ref 3.5–5)
POTASSIUM SERPL-SCNC: 3.7 MMOL/L (ref 3.5–5)
POTASSIUM SERPL-SCNC: 3.8 MMOL/L (ref 3.5–5)
POTASSIUM SERPL-SCNC: 4.1 MMOL/L (ref 3.5–5)
POTASSIUM SERPL-SCNC: 4.2 MMOL/L (ref 3.5–5)
POTASSIUM SERPL-SCNC: 4.3 MMOL/L (ref 3.5–5)
POTASSIUM SERPL-SCNC: 4.4 MMOL/L (ref 3.5–5)
PRO-BNP: ABNORMAL PG/ML (ref 0–450)
PROTEIN 24 HOUR URINE: 0.07 G/24HR (ref 0–0.14)
PROTEIN UA: NEGATIVE MG/DL
PROTHROMBIN TIME: 18.8 SEC (ref 9.3–12.4)
PROTHROMBIN TIME: 20.6 SEC (ref 9.3–12.4)
PROTHROMBIN TIME: 20.9 SEC (ref 9.3–12.4)
PROTHROMBIN TIME: 23.5 SEC (ref 9.3–12.4)
PROTHROMBIN TIME: 26.4 SEC (ref 9.3–12.4)
PROTHROMBIN TIME: 27.4 SEC (ref 9.3–12.4)
RBC # BLD: 3 E12/L (ref 3.8–5.8)
RBC # BLD: 3.14 E12/L (ref 3.8–5.8)
RBC # BLD: 3.26 E12/L (ref 3.8–5.8)
RBC # BLD: 3.36 E12/L (ref 3.8–5.8)
RBC # BLD: 3.49 E12/L (ref 3.8–5.8)
RBC # BLD: 4.18 E12/L (ref 3.8–5.8)
RBC FLUID: NORMAL /UL
SODIUM BLD-SCNC: 133 MMOL/L (ref 132–146)
SODIUM BLD-SCNC: 135 MMOL/L (ref 132–146)
SODIUM BLD-SCNC: 136 MMOL/L (ref 132–146)
SODIUM BLD-SCNC: 136 MMOL/L (ref 132–146)
SODIUM BLD-SCNC: 141 MMOL/L (ref 132–146)
SODIUM BLD-SCNC: 142 MMOL/L (ref 132–146)
SODIUM BLD-SCNC: 142 MMOL/L (ref 132–146)
SPECIFIC GRAVITY UA: 1.02 (ref 1–1.03)
TOTAL PROTEIN: 4.9 G/DL (ref 6.4–8.3)
TOTAL PROTEIN: 5 G/DL (ref 6.4–8.3)
TOTAL PROTEIN: 5.3 G/DL (ref 6.4–8.3)
TOTAL PROTEIN: 5.3 G/DL (ref 6.4–8.3)
TOTAL PROTEIN: 5.8 G/DL (ref 6.4–8.3)
TOTAL PROTEIN: 6.1 G/DL (ref 6.4–8.3)
TROPONIN: 0.04 NG/ML (ref 0–0.03)
URINE CULTURE, ROUTINE: NORMAL
UROBILINOGEN, URINE: 0.2 E.U./DL
WBC # BLD: 14.5 E9/L (ref 4.5–11.5)
WBC # BLD: 15.3 E9/L (ref 4.5–11.5)
WBC # BLD: 16.1 E9/L (ref 4.5–11.5)
WBC # BLD: 16.2 E9/L (ref 4.5–11.5)
WBC # BLD: 18.9 E9/L (ref 4.5–11.5)
WBC # BLD: 20.9 E9/L (ref 4.5–11.5)

## 2020-01-01 PROCEDURE — 2500000003 HC RX 250 WO HCPCS: Performed by: EMERGENCY MEDICINE

## 2020-01-01 PROCEDURE — 4040F PNEUMOC VAC/ADMIN/RCVD: CPT | Performed by: INTERNAL MEDICINE

## 2020-01-01 PROCEDURE — 1111F DSCHRG MED/CURRENT MED MERGE: CPT | Performed by: INTERNAL MEDICINE

## 2020-01-01 PROCEDURE — 85025 COMPLETE CBC W/AUTO DIFF WBC: CPT

## 2020-01-01 PROCEDURE — 2580000003 HC RX 258: Performed by: FAMILY MEDICINE

## 2020-01-01 PROCEDURE — 6370000000 HC RX 637 (ALT 250 FOR IP): Performed by: SPECIALIST

## 2020-01-01 PROCEDURE — 80048 BASIC METABOLIC PNL TOTAL CA: CPT

## 2020-01-01 PROCEDURE — 96365 THER/PROPH/DIAG IV INF INIT: CPT

## 2020-01-01 PROCEDURE — 93000 ELECTROCARDIOGRAM COMPLETE: CPT | Performed by: INTERNAL MEDICINE

## 2020-01-01 PROCEDURE — 97165 OT EVAL LOW COMPLEX 30 MIN: CPT

## 2020-01-01 PROCEDURE — 2580000003 HC RX 258: Performed by: SPECIALIST

## 2020-01-01 PROCEDURE — 80053 COMPREHEN METABOLIC PANEL: CPT

## 2020-01-01 PROCEDURE — G8427 DOCREV CUR MEDS BY ELIG CLIN: HCPCS | Performed by: INTERNAL MEDICINE

## 2020-01-01 PROCEDURE — P9047 ALBUMIN (HUMAN), 25%, 50ML: HCPCS | Performed by: INTERNAL MEDICINE

## 2020-01-01 PROCEDURE — 99213 OFFICE O/P EST LOW 20 MIN: CPT | Performed by: INTERNAL MEDICINE

## 2020-01-01 PROCEDURE — 85610 PROTHROMBIN TIME: CPT

## 2020-01-01 PROCEDURE — C1729 CATH, DRAINAGE: HCPCS

## 2020-01-01 PROCEDURE — 83880 ASSAY OF NATRIURETIC PEPTIDE: CPT

## 2020-01-01 PROCEDURE — 2580000003 HC RX 258: Performed by: CLINICAL NURSE SPECIALIST

## 2020-01-01 PROCEDURE — G8484 FLU IMMUNIZE NO ADMIN: HCPCS | Performed by: INTERNAL MEDICINE

## 2020-01-01 PROCEDURE — 74176 CT ABD & PELVIS W/O CONTRAST: CPT

## 2020-01-01 PROCEDURE — 97530 THERAPEUTIC ACTIVITIES: CPT

## 2020-01-01 PROCEDURE — 6360000002 HC RX W HCPCS: Performed by: CLINICAL NURSE SPECIALIST

## 2020-01-01 PROCEDURE — 6360000002 HC RX W HCPCS: Performed by: EMERGENCY MEDICINE

## 2020-01-01 PROCEDURE — 76705 ECHO EXAM OF ABDOMEN: CPT

## 2020-01-01 PROCEDURE — 96375 TX/PRO/DX INJ NEW DRUG ADDON: CPT

## 2020-01-01 PROCEDURE — 84484 ASSAY OF TROPONIN QUANT: CPT

## 2020-01-01 PROCEDURE — 99441 PR PHYS/QHP TELEPHONE EVALUATION 5-10 MIN: CPT | Performed by: TRANSPLANT SURGERY

## 2020-01-01 PROCEDURE — 36415 COLL VENOUS BLD VENIPUNCTURE: CPT

## 2020-01-01 PROCEDURE — 6370000000 HC RX 637 (ALT 250 FOR IP): Performed by: FAMILY MEDICINE

## 2020-01-01 PROCEDURE — 97161 PT EVAL LOW COMPLEX 20 MIN: CPT

## 2020-01-01 PROCEDURE — 0W9G3ZZ DRAINAGE OF PERITONEAL CAVITY, PERCUTANEOUS APPROACH: ICD-10-PCS | Performed by: PODIATRIST

## 2020-01-01 PROCEDURE — 1123F ACP DISCUSS/DSCN MKR DOCD: CPT | Performed by: INTERNAL MEDICINE

## 2020-01-01 PROCEDURE — 85027 COMPLETE CBC AUTOMATED: CPT

## 2020-01-01 PROCEDURE — 1036F TOBACCO NON-USER: CPT | Performed by: INTERNAL MEDICINE

## 2020-01-01 PROCEDURE — 2060000000 HC ICU INTERMEDIATE R&B

## 2020-01-01 PROCEDURE — 87077 CULTURE AEROBIC IDENTIFY: CPT

## 2020-01-01 PROCEDURE — 6370000000 HC RX 637 (ALT 250 FOR IP): Performed by: NURSE PRACTITIONER

## 2020-01-01 PROCEDURE — 82728 ASSAY OF FERRITIN: CPT

## 2020-01-01 PROCEDURE — G8428 CUR MEDS NOT DOCUMENT: HCPCS | Performed by: TRANSPLANT SURGERY

## 2020-01-01 PROCEDURE — 97535 SELF CARE MNGMENT TRAINING: CPT

## 2020-01-01 PROCEDURE — G8420 CALC BMI NORM PARAMETERS: HCPCS | Performed by: INTERNAL MEDICINE

## 2020-01-01 PROCEDURE — P9047 ALBUMIN (HUMAN), 25%, 50ML: HCPCS | Performed by: EMERGENCY MEDICINE

## 2020-01-01 PROCEDURE — 87205 SMEAR GRAM STAIN: CPT

## 2020-01-01 PROCEDURE — G8417 CALC BMI ABV UP PARAM F/U: HCPCS | Performed by: INTERNAL MEDICINE

## 2020-01-01 PROCEDURE — 82103 ALPHA-1-ANTITRYPSIN TOTAL: CPT

## 2020-01-01 PROCEDURE — 87186 SC STD MICRODIL/AGAR DIL: CPT

## 2020-01-01 PROCEDURE — 83615 LACTATE (LD) (LDH) ENZYME: CPT

## 2020-01-01 PROCEDURE — 99214 OFFICE O/P EST MOD 30 MIN: CPT | Performed by: INTERNAL MEDICINE

## 2020-01-01 PROCEDURE — 87040 BLOOD CULTURE FOR BACTERIA: CPT

## 2020-01-01 PROCEDURE — 93010 ELECTROCARDIOGRAM REPORT: CPT | Performed by: INTERNAL MEDICINE

## 2020-01-01 PROCEDURE — 82140 ASSAY OF AMMONIA: CPT

## 2020-01-01 PROCEDURE — 93005 ELECTROCARDIOGRAM TRACING: CPT | Performed by: EMERGENCY MEDICINE

## 2020-01-01 PROCEDURE — 87070 CULTURE OTHR SPECIMN AEROBIC: CPT

## 2020-01-01 PROCEDURE — 99285 EMERGENCY DEPT VISIT HI MDM: CPT

## 2020-01-01 PROCEDURE — 1123F ACP DISCUSS/DSCN MKR DOCD: CPT | Performed by: TRANSPLANT SURGERY

## 2020-01-01 PROCEDURE — 6360000002 HC RX W HCPCS: Performed by: INTERNAL MEDICINE

## 2020-01-01 PROCEDURE — 87088 URINE BACTERIA CULTURE: CPT

## 2020-01-01 PROCEDURE — 81003 URINALYSIS AUTO W/O SCOPE: CPT

## 2020-01-01 PROCEDURE — 99232 SBSQ HOSP IP/OBS MODERATE 35: CPT | Performed by: INTERNAL MEDICINE

## 2020-01-01 PROCEDURE — 85730 THROMBOPLASTIN TIME PARTIAL: CPT

## 2020-01-01 PROCEDURE — 6360000002 HC RX W HCPCS: Performed by: SPECIALIST

## 2020-01-01 PROCEDURE — 4040F PNEUMOC VAC/ADMIN/RCVD: CPT | Performed by: TRANSPLANT SURGERY

## 2020-01-01 PROCEDURE — 99223 1ST HOSP IP/OBS HIGH 75: CPT | Performed by: INTERNAL MEDICINE

## 2020-01-01 PROCEDURE — 6370000000 HC RX 637 (ALT 250 FOR IP): Performed by: INTERNAL MEDICINE

## 2020-01-01 PROCEDURE — APPSS60 APP SPLIT SHARED TIME 46-60 MINUTES: Performed by: NURSE PRACTITIONER

## 2020-01-01 PROCEDURE — 71045 X-RAY EXAM CHEST 1 VIEW: CPT

## 2020-01-01 PROCEDURE — 89051 BODY FLUID CELL COUNT: CPT

## 2020-01-01 PROCEDURE — 87502 INFLUENZA DNA AMP PROBE: CPT

## 2020-01-01 PROCEDURE — 2580000003 HC RX 258: Performed by: EMERGENCY MEDICINE

## 2020-01-01 PROCEDURE — 6360000002 HC RX W HCPCS: Performed by: FAMILY MEDICINE

## 2020-01-01 PROCEDURE — 84156 ASSAY OF PROTEIN URINE: CPT

## 2020-01-01 RX ORDER — LISINOPRIL 5 MG/1
2.5 TABLET ORAL DAILY
Status: DISCONTINUED | OUTPATIENT
Start: 2020-01-01 | End: 2020-01-01

## 2020-01-01 RX ORDER — CEFDINIR 300 MG/1
300 CAPSULE ORAL EVERY 12 HOURS SCHEDULED
Status: DISCONTINUED | OUTPATIENT
Start: 2020-01-01 | End: 2020-01-01 | Stop reason: HOSPADM

## 2020-01-01 RX ORDER — CARVEDILOL 3.12 MG/1
3.12 TABLET ORAL 2 TIMES DAILY WITH MEALS
Status: DISCONTINUED | OUTPATIENT
Start: 2020-01-01 | End: 2020-01-01 | Stop reason: HOSPADM

## 2020-01-01 RX ORDER — CEFDINIR 300 MG/1
300 CAPSULE ORAL EVERY 12 HOURS SCHEDULED
Qty: 14 CAPSULE | Refills: 0 | Status: SHIPPED | OUTPATIENT
Start: 2020-01-01 | End: 2020-01-01

## 2020-01-01 RX ORDER — SODIUM CHLORIDE 0.9 % (FLUSH) 0.9 %
10 SYRINGE (ML) INJECTION EVERY 12 HOURS SCHEDULED
Status: DISCONTINUED | OUTPATIENT
Start: 2020-01-01 | End: 2020-01-01 | Stop reason: HOSPADM

## 2020-01-01 RX ORDER — VITAMIN B COMPLEX
1000 TABLET ORAL DAILY
Status: DISCONTINUED | OUTPATIENT
Start: 2020-01-01 | End: 2020-01-01 | Stop reason: HOSPADM

## 2020-01-01 RX ORDER — 0.9 % SODIUM CHLORIDE 0.9 %
500 INTRAVENOUS SOLUTION INTRAVENOUS ONCE
Status: COMPLETED | OUTPATIENT
Start: 2020-01-01 | End: 2020-01-01

## 2020-01-01 RX ORDER — CARVEDILOL 6.25 MG/1
6.25 TABLET ORAL 2 TIMES DAILY WITH MEALS
Qty: 60 TABLET | Refills: 3 | Status: SHIPPED | OUTPATIENT
Start: 2020-01-01

## 2020-01-01 RX ORDER — LISINOPRIL 5 MG/1
2.5 TABLET ORAL DAILY
Qty: 15 TABLET | Refills: 0 | Status: SHIPPED | OUTPATIENT
Start: 2020-01-01

## 2020-01-01 RX ORDER — FENTANYL CITRATE 50 UG/ML
25 INJECTION, SOLUTION INTRAMUSCULAR; INTRAVENOUS ONCE
Status: COMPLETED | OUTPATIENT
Start: 2020-01-01 | End: 2020-01-01

## 2020-01-01 RX ORDER — LACTOBACILLUS RHAMNOSUS GG 10B CELL
1 CAPSULE ORAL DAILY
Status: DISCONTINUED | OUTPATIENT
Start: 2020-01-01 | End: 2020-01-01 | Stop reason: HOSPADM

## 2020-01-01 RX ORDER — BUMETANIDE 1 MG/1
1 TABLET ORAL 2 TIMES DAILY
Qty: 60 TABLET | Refills: 5 | Status: SHIPPED | OUTPATIENT
Start: 2020-01-01

## 2020-01-01 RX ORDER — CARVEDILOL 6.25 MG/1
12.5 TABLET ORAL 2 TIMES DAILY WITH MEALS
Status: DISCONTINUED | OUTPATIENT
Start: 2020-01-01 | End: 2020-01-01

## 2020-01-01 RX ORDER — SPIRONOLACTONE 25 MG/1
25 TABLET ORAL DAILY
Status: DISCONTINUED | OUTPATIENT
Start: 2020-01-01 | End: 2020-01-01 | Stop reason: HOSPADM

## 2020-01-01 RX ORDER — CARVEDILOL 6.25 MG/1
6.25 TABLET ORAL 2 TIMES DAILY WITH MEALS
Status: DISCONTINUED | OUTPATIENT
Start: 2020-01-01 | End: 2020-01-01

## 2020-01-01 RX ORDER — ALBUMIN (HUMAN) 12.5 G/50ML
25 SOLUTION INTRAVENOUS ONCE
Status: COMPLETED | OUTPATIENT
Start: 2020-01-01 | End: 2020-01-01

## 2020-01-01 RX ORDER — MULTIVITAMIN WITH FOLIC ACID 400 MCG
1 TABLET ORAL DAILY
Status: DISCONTINUED | OUTPATIENT
Start: 2020-01-01 | End: 2020-01-01 | Stop reason: HOSPADM

## 2020-01-01 RX ORDER — LACTOBACILLUS RHAMNOSUS GG 10B CELL
2 CAPSULE ORAL EVERY 12 HOURS
Qty: 100 CAPSULE | Refills: 3 | Status: SHIPPED | OUTPATIENT
Start: 2020-01-01

## 2020-01-01 RX ORDER — LACTOBACILLUS RHAMNOSUS GG 10B CELL
1 CAPSULE ORAL 2 TIMES DAILY
Status: DISCONTINUED | OUTPATIENT
Start: 2020-01-01 | End: 2020-01-01 | Stop reason: HOSPADM

## 2020-01-01 RX ORDER — ALBUMIN (HUMAN) 12.5 G/50ML
25 SOLUTION INTRAVENOUS EVERY 6 HOURS
Status: COMPLETED | OUTPATIENT
Start: 2020-01-01 | End: 2020-01-01

## 2020-01-01 RX ORDER — BUMETANIDE 1 MG/1
1 TABLET ORAL 2 TIMES DAILY
Qty: 30 TABLET | Refills: 5 | Status: SHIPPED
Start: 2020-01-01 | End: 2020-01-01 | Stop reason: SDUPTHER

## 2020-01-01 RX ORDER — SODIUM CHLORIDE 9 MG/ML
INJECTION, SOLUTION INTRAVENOUS EVERY 12 HOURS
Status: DISCONTINUED | OUTPATIENT
Start: 2020-01-01 | End: 2020-01-01

## 2020-01-01 RX ORDER — L. ACIDOPHILUS/L.BULGARICUS 1MM CELL
4 TABLET ORAL 3 TIMES DAILY
Status: DISCONTINUED | OUTPATIENT
Start: 2020-01-01 | End: 2020-01-01 | Stop reason: CLARIF

## 2020-01-01 RX ORDER — ACETAMINOPHEN 325 MG/1
650 TABLET ORAL EVERY 4 HOURS PRN
Status: DISCONTINUED | OUTPATIENT
Start: 2020-01-01 | End: 2020-01-01 | Stop reason: HOSPADM

## 2020-01-01 RX ORDER — SPIRONOLACTONE 25 MG/1
12.5 TABLET ORAL DAILY
Status: DISCONTINUED | OUTPATIENT
Start: 2020-01-01 | End: 2020-01-01

## 2020-01-01 RX ORDER — WARFARIN SODIUM 5 MG/1
5 TABLET ORAL
Status: COMPLETED | OUTPATIENT
Start: 2020-01-01 | End: 2020-01-01

## 2020-01-01 RX ORDER — BUMETANIDE 1 MG/1
2 TABLET ORAL DAILY
Qty: 60 TABLET | Refills: 5 | Status: CANCELLED | OUTPATIENT
Start: 2020-01-01

## 2020-01-01 RX ORDER — FENTANYL CITRATE 50 UG/ML
50 INJECTION, SOLUTION INTRAMUSCULAR; INTRAVENOUS
Status: DISCONTINUED | OUTPATIENT
Start: 2020-01-01 | End: 2020-01-01 | Stop reason: HOSPADM

## 2020-01-01 RX ORDER — SPIRONOLACTONE 25 MG/1
25 TABLET ORAL DAILY
Qty: 30 TABLET | Refills: 3 | Status: SHIPPED | OUTPATIENT
Start: 2020-01-01

## 2020-01-01 RX ORDER — SODIUM CHLORIDE 0.9 % (FLUSH) 0.9 %
10 SYRINGE (ML) INJECTION PRN
Status: DISCONTINUED | OUTPATIENT
Start: 2020-01-01 | End: 2020-01-01 | Stop reason: HOSPADM

## 2020-01-01 RX ORDER — BUMETANIDE 1 MG/1
0.5 TABLET ORAL EVERY OTHER DAY
Status: DISCONTINUED | OUTPATIENT
Start: 2020-01-01 | End: 2020-01-01

## 2020-01-01 RX ORDER — FENTANYL CITRATE 50 UG/ML
25 INJECTION, SOLUTION INTRAMUSCULAR; INTRAVENOUS
Status: DISCONTINUED | OUTPATIENT
Start: 2020-01-01 | End: 2020-01-01 | Stop reason: HOSPADM

## 2020-01-01 RX ORDER — DIGOXIN 125 MCG
125 TABLET ORAL DAILY
Status: DISCONTINUED | OUTPATIENT
Start: 2020-01-01 | End: 2020-01-01 | Stop reason: HOSPADM

## 2020-01-01 RX ORDER — BUMETANIDE 1 MG/1
1 TABLET ORAL DAILY
Qty: 30 TABLET | Refills: 5 | Status: SHIPPED
Start: 2020-01-01 | End: 2020-01-01 | Stop reason: DRUGHIGH

## 2020-01-01 RX ORDER — ATORVASTATIN CALCIUM 20 MG/1
20 TABLET, FILM COATED ORAL DAILY
Status: DISCONTINUED | OUTPATIENT
Start: 2020-01-01 | End: 2020-01-01

## 2020-01-01 RX ORDER — WARFARIN SODIUM 7.5 MG/1
7.5 TABLET ORAL
Status: COMPLETED | OUTPATIENT
Start: 2020-01-01 | End: 2020-01-01

## 2020-01-01 RX ADMIN — CEFTRIAXONE 2 G: 2 INJECTION, POWDER, FOR SOLUTION INTRAMUSCULAR; INTRAVENOUS at 12:47

## 2020-01-01 RX ADMIN — ANORECTAL OINTMENT: 15.7; .44; 24; 20.6 OINTMENT TOPICAL at 08:04

## 2020-01-01 RX ADMIN — Medication 10 ML: at 20:16

## 2020-01-01 RX ADMIN — ALBUMIN (HUMAN) 25 G: 0.25 INJECTION, SOLUTION INTRAVENOUS at 06:20

## 2020-01-01 RX ADMIN — FENTANYL CITRATE 25 MCG: 50 INJECTION, SOLUTION INTRAMUSCULAR; INTRAVENOUS at 23:10

## 2020-01-01 RX ADMIN — Medication 10 ML: at 09:57

## 2020-01-01 RX ADMIN — ANORECTAL OINTMENT: 15.7; .44; 24; 20.6 OINTMENT TOPICAL at 20:32

## 2020-01-01 RX ADMIN — ATORVASTATIN CALCIUM 20 MG: 20 TABLET, FILM COATED ORAL at 09:56

## 2020-01-01 RX ADMIN — ALBUMIN (HUMAN) 25 G: 0.25 INJECTION, SOLUTION INTRAVENOUS at 19:52

## 2020-01-01 RX ADMIN — MULTIVITAMIN TABLET 1 TABLET: TABLET at 09:57

## 2020-01-01 RX ADMIN — WARFARIN SODIUM 5 MG: 5 TABLET ORAL at 18:13

## 2020-01-01 RX ADMIN — CEFDINIR 300 MG: 300 CAPSULE ORAL at 20:32

## 2020-01-01 RX ADMIN — MULTIVITAMIN TABLET 1 TABLET: TABLET at 08:46

## 2020-01-01 RX ADMIN — CARVEDILOL 12.5 MG: 6.25 TABLET, FILM COATED ORAL at 09:56

## 2020-01-01 RX ADMIN — Medication 1 CAPSULE: at 08:25

## 2020-01-01 RX ADMIN — ALBUMIN (HUMAN) 25 G: 0.25 INJECTION, SOLUTION INTRAVENOUS at 23:54

## 2020-01-01 RX ADMIN — CARVEDILOL 6.25 MG: 6.25 TABLET, FILM COATED ORAL at 08:25

## 2020-01-01 RX ADMIN — SODIUM CHLORIDE: 9 INJECTION, SOLUTION INTRAVENOUS at 22:57

## 2020-01-01 RX ADMIN — CEFEPIME HYDROCHLORIDE 2 G: 2 INJECTION, POWDER, FOR SOLUTION INTRAVENOUS at 06:20

## 2020-01-01 RX ADMIN — CARVEDILOL 6.25 MG: 6.25 TABLET, FILM COATED ORAL at 17:15

## 2020-01-01 RX ADMIN — SPIRONOLACTONE 12.5 MG: 25 TABLET ORAL at 06:05

## 2020-01-01 RX ADMIN — ANORECTAL OINTMENT: 15.7; .44; 24; 20.6 OINTMENT TOPICAL at 08:24

## 2020-01-01 RX ADMIN — Medication 10 ML: at 23:10

## 2020-01-01 RX ADMIN — Medication 10 ML: at 08:13

## 2020-01-01 RX ADMIN — Medication 1 CAPSULE: at 08:46

## 2020-01-01 RX ADMIN — DIGOXIN 125 MCG: 125 TABLET ORAL at 08:11

## 2020-01-01 RX ADMIN — MULTIVITAMIN TABLET 1 TABLET: TABLET at 08:11

## 2020-01-01 RX ADMIN — LISINOPRIL 2.5 MG: 5 TABLET ORAL at 08:47

## 2020-01-01 RX ADMIN — SODIUM CHLORIDE: 9 INJECTION, SOLUTION INTRAVENOUS at 10:25

## 2020-01-01 RX ADMIN — ANORECTAL OINTMENT: 15.7; .44; 24; 20.6 OINTMENT TOPICAL at 08:12

## 2020-01-01 RX ADMIN — Medication 1 CAPSULE: at 08:04

## 2020-01-01 RX ADMIN — DIGOXIN 125 MCG: 125 TABLET ORAL at 08:25

## 2020-01-01 RX ADMIN — DIGOXIN 125 MCG: 125 TABLET ORAL at 08:47

## 2020-01-01 RX ADMIN — BUMETANIDE 0.5 MG: 1 TABLET ORAL at 09:57

## 2020-01-01 RX ADMIN — VITAMIN D, TAB 1000IU (100/BT) 1000 UNITS: 25 TAB at 08:51

## 2020-01-01 RX ADMIN — Medication 10 ML: at 08:50

## 2020-01-01 RX ADMIN — CARVEDILOL 6.25 MG: 6.25 TABLET, FILM COATED ORAL at 08:47

## 2020-01-01 RX ADMIN — VITAMIN D, TAB 1000IU (100/BT) 1000 UNITS: 25 TAB at 08:25

## 2020-01-01 RX ADMIN — MULTIVITAMIN TABLET 1 TABLET: TABLET at 08:04

## 2020-01-01 RX ADMIN — SPIRONOLACTONE 12.5 MG: 25 TABLET ORAL at 05:55

## 2020-01-01 RX ADMIN — Medication 1 CAPSULE: at 20:15

## 2020-01-01 RX ADMIN — ANORECTAL OINTMENT: 15.7; .44; 24; 20.6 OINTMENT TOPICAL at 08:46

## 2020-01-01 RX ADMIN — Medication 10 ML: at 20:33

## 2020-01-01 RX ADMIN — CARVEDILOL 12.5 MG: 6.25 TABLET, FILM COATED ORAL at 16:35

## 2020-01-01 RX ADMIN — Medication 1 CAPSULE: at 18:13

## 2020-01-01 RX ADMIN — Medication 10 ML: at 08:25

## 2020-01-01 RX ADMIN — CARVEDILOL 6.25 MG: 6.25 TABLET, FILM COATED ORAL at 16:13

## 2020-01-01 RX ADMIN — Medication 10 ML: at 22:58

## 2020-01-01 RX ADMIN — DIGOXIN 125 MCG: 125 TABLET ORAL at 09:56

## 2020-01-01 RX ADMIN — CEFEPIME HYDROCHLORIDE 2 G: 2 INJECTION, POWDER, FOR SOLUTION INTRAVENOUS at 18:28

## 2020-01-01 RX ADMIN — ENOXAPARIN SODIUM 30 MG: 30 INJECTION SUBCUTANEOUS at 23:10

## 2020-01-01 RX ADMIN — CARVEDILOL 6.25 MG: 6.25 TABLET, FILM COATED ORAL at 17:03

## 2020-01-01 RX ADMIN — Medication 10 ML: at 19:53

## 2020-01-01 RX ADMIN — DIGOXIN 125 MCG: 125 TABLET ORAL at 08:04

## 2020-01-01 RX ADMIN — ANORECTAL OINTMENT: 15.7; .44; 24; 20.6 OINTMENT TOPICAL at 20:15

## 2020-01-01 RX ADMIN — CARVEDILOL 12.5 MG: 6.25 TABLET, FILM COATED ORAL at 23:10

## 2020-01-01 RX ADMIN — WARFARIN SODIUM 5 MG: 5 TABLET ORAL at 17:15

## 2020-01-01 RX ADMIN — CEFDINIR 300 MG: 300 CAPSULE ORAL at 08:12

## 2020-01-01 RX ADMIN — ANORECTAL OINTMENT: 15.7; .44; 24; 20.6 OINTMENT TOPICAL at 19:52

## 2020-01-01 RX ADMIN — ANORECTAL OINTMENT: 15.7; .44; 24; 20.6 OINTMENT TOPICAL at 22:58

## 2020-01-01 RX ADMIN — WARFARIN SODIUM 7.5 MG: 7.5 TABLET ORAL at 17:03

## 2020-01-01 RX ADMIN — VITAMIN D, TAB 1000IU (100/BT) 1000 UNITS: 25 TAB at 08:12

## 2020-01-01 RX ADMIN — CEFTRIAXONE 2 G: 2 INJECTION, POWDER, FOR SOLUTION INTRAMUSCULAR; INTRAVENOUS at 11:35

## 2020-01-01 RX ADMIN — ENOXAPARIN SODIUM 30 MG: 30 INJECTION SUBCUTANEOUS at 09:56

## 2020-01-01 RX ADMIN — VITAMIN D, TAB 1000IU (100/BT) 1000 UNITS: 25 TAB at 08:04

## 2020-01-01 RX ADMIN — ALBUMIN (HUMAN) 25 G: 0.25 INJECTION, SOLUTION INTRAVENOUS at 12:47

## 2020-01-01 RX ADMIN — FENTANYL CITRATE 25 MCG: 50 INJECTION, SOLUTION INTRAMUSCULAR; INTRAVENOUS at 13:46

## 2020-01-01 RX ADMIN — WARFARIN SODIUM 5 MG: 5 TABLET ORAL at 17:00

## 2020-01-01 RX ADMIN — Medication 1 CAPSULE: at 08:12

## 2020-01-01 RX ADMIN — VITAMIN D, TAB 1000IU (100/BT) 1000 UNITS: 25 TAB at 09:56

## 2020-01-01 RX ADMIN — ALBUMIN (HUMAN) 25 G: 0.25 INJECTION, SOLUTION INTRAVENOUS at 12:13

## 2020-01-01 RX ADMIN — Medication 1 CAPSULE: at 11:35

## 2020-01-01 RX ADMIN — CEFTRIAXONE 2 G: 2 INJECTION, POWDER, FOR SOLUTION INTRAMUSCULAR; INTRAVENOUS at 13:26

## 2020-01-01 RX ADMIN — CEFTRIAXONE 2 G: 2 INJECTION, POWDER, FOR SOLUTION INTRAMUSCULAR; INTRAVENOUS at 14:35

## 2020-01-01 RX ADMIN — SODIUM CHLORIDE 500 ML: 9 INJECTION, SOLUTION INTRAVENOUS at 12:09

## 2020-01-01 RX ADMIN — METRONIDAZOLE 500 MG: 500 INJECTION, SOLUTION INTRAVENOUS at 15:41

## 2020-01-01 RX ADMIN — SPIRONOLACTONE 12.5 MG: 25 TABLET ORAL at 16:57

## 2020-01-01 RX ADMIN — SPIRONOLACTONE 25 MG: 25 TABLET ORAL at 05:53

## 2020-01-01 RX ADMIN — MULTIVITAMIN TABLET 1 TABLET: TABLET at 08:25

## 2020-01-01 RX ADMIN — Medication 1 CAPSULE: at 20:32

## 2020-01-01 ASSESSMENT — PAIN DESCRIPTION - FREQUENCY
FREQUENCY: CONTINUOUS
FREQUENCY: INTERMITTENT

## 2020-01-01 ASSESSMENT — PAIN DESCRIPTION - ONSET: ONSET: ON-GOING

## 2020-01-01 ASSESSMENT — PAIN SCALES - GENERAL
PAINLEVEL_OUTOF10: 0
PAINLEVEL_OUTOF10: 0
PAINLEVEL_OUTOF10: 8
PAINLEVEL_OUTOF10: 0
PAINLEVEL_OUTOF10: 6
PAINLEVEL_OUTOF10: 0
PAINLEVEL_OUTOF10: 6
PAINLEVEL_OUTOF10: 0
PAINLEVEL_OUTOF10: 8
PAINLEVEL_OUTOF10: 0
PAINLEVEL_OUTOF10: 6

## 2020-01-01 ASSESSMENT — ENCOUNTER SYMPTOMS
VOMITING: 0
SORE THROAT: 0
EYE DISCHARGE: 0
SHORTNESS OF BREATH: 0
ABDOMINAL PAIN: 1
COUGH: 0
ABDOMINAL DISTENTION: 1
EYE REDNESS: 0
NAUSEA: 0
EYE PAIN: 0
SINUS PRESSURE: 0
WHEEZING: 0
DIARRHEA: 0
BACK PAIN: 0

## 2020-01-01 ASSESSMENT — PAIN DESCRIPTION - DESCRIPTORS: DESCRIPTORS: SHARP

## 2020-01-01 ASSESSMENT — PAIN DESCRIPTION - PROGRESSION: CLINICAL_PROGRESSION: NOT CHANGED

## 2020-01-01 ASSESSMENT — PAIN DESCRIPTION - PAIN TYPE
TYPE: ACUTE PAIN
TYPE: ACUTE PAIN

## 2020-01-01 ASSESSMENT — PAIN DESCRIPTION - LOCATION
LOCATION: ABDOMEN
LOCATION: ABDOMEN

## 2020-01-01 ASSESSMENT — PAIN - FUNCTIONAL ASSESSMENT: PAIN_FUNCTIONAL_ASSESSMENT: PREVENTS OR INTERFERES SOME ACTIVE ACTIVITIES AND ADLS

## 2020-01-05 PROBLEM — K65.2 SBP (SPONTANEOUS BACTERIAL PERITONITIS) (HCC): Status: ACTIVE | Noted: 2020-01-01

## 2020-01-05 NOTE — ED PROVIDER NOTES
Hematocrit 36.6 (L) 37.0 - 54.0 %    MCV 87.6 80.0 - 99.9 fL    MCH 30.1 26.0 - 35.0 pg    MCHC 34.4 32.0 - 34.5 %    RDW 13.8 11.5 - 15.0 fL    Platelets 972 480 - 049 E9/L    MPV 10.5 7.0 - 12.0 fL    Neutrophils % 87.6 (H) 43.0 - 80.0 %    Immature Granulocytes % 1.1 0.0 - 5.0 %    Lymphocytes % 4.6 (L) 20.0 - 42.0 %    Monocytes % 6.5 2.0 - 12.0 %    Eosinophils % 0.0 0.0 - 6.0 %    Basophils % 0.2 0.0 - 2.0 %    Neutrophils Absolute 18.31 (H) 1.80 - 7.30 E9/L    Immature Granulocytes # 0.23 E9/L    Lymphocytes Absolute 0.97 (L) 1.50 - 4.00 E9/L    Monocytes Absolute 1.35 (H) 0.10 - 0.95 E9/L    Eosinophils Absolute 0.00 (L) 0.05 - 0.50 E9/L    Basophils Absolute 0.05 0.00 - 0.20 E9/L   Comprehensive Metabolic Panel   Result Value Ref Range    Sodium 135 132 - 146 mmol/L    Potassium 4.1 3.5 - 5.0 mmol/L    Chloride 95 (L) 98 - 107 mmol/L    CO2 26 22 - 29 mmol/L    Anion Gap 14 7 - 16 mmol/L    Glucose 114 (H) 74 - 99 mg/dL    BUN 51 (H) 8 - 23 mg/dL    CREATININE 2.0 (H) 0.7 - 1.2 mg/dL    GFR Non-African American 32 >=60 mL/min/1.73    GFR African American 39     Calcium 8.8 8.6 - 10.2 mg/dL    Total Protein 6.1 (L) 6.4 - 8.3 g/dL    Alb 3.0 (L) 3.5 - 5.2 g/dL    Total Bilirubin 3.2 (H) 0.0 - 1.2 mg/dL    Alkaline Phosphatase 88 40 - 129 U/L    ALT 18 0 - 40 U/L    AST 26 0 - 39 U/L   Ammonia   Result Value Ref Range    Ammonia 11.2 (L) 16.0 - 60.0 umol/L   Protime-INR   Result Value Ref Range    Protime 20.6 (H) 9.3 - 12.4 sec    INR 1.8    LACTATE DEHYDROGENASE, BODY FLUID   Result Value Ref Range    Fluid Type Ascites    EKG 12 Lead   Result Value Ref Range    Ventricular Rate 83 BPM    Atrial Rate 55 BPM    QRS Duration 110 ms    Q-T Interval 344 ms    QTc Calculation (Bazett) 404 ms    R Axis -45 degrees    T Axis 76 degrees       RADIOLOGY:  Ct Abdomen Pelvis Wo Contrast Additional Contrast? None    Result Date: 2020  Patient MRN:  [id number] : 1935 Age: 82 years Gender: Male Order Date: and they are agreeable with the plan of admission.    --------------------------------- ADDITIONAL PROVIDER NOTES ---------------------------------  Consultations:  Time: 1520. Spoke with Dr. Caron Frankel. Discussed case. They will admit the patient. This patient's ED course included: a personal history and physicial examination    This patient has remained hemodynamically stable during their ED course. Diagnosis:  1. SBP (spontaneous bacterial peritonitis) ([address]    2. Other ascites    3. Septicemia ([address]        Disposition:  Patient's disposition: Admit to telemetry  Patient's condition is stable.          Monica Medley DO  Resident  01/05/20 4486

## 2020-01-05 NOTE — H&P
Interventional Radiology  PRE-OPERATIVE H&P FOR PARACENTESIS    DIAGNOSIS:    Patient Active Problem List   Diagnosis    CAD (coronary artery disease)    HTN (hypertension)    Type 2 diabetes mellitus with complication (HCC)    Hyperlipidemia    Vitamin D deficiency    Renal insufficiency    Joint pain    Gastritis    Polymyalgia rheumatica syndrome (HCC)    Primary osteoarthritis of both knees    Hypothyroidism    [facility]-related macular degeneration [facility]    Atrial fibrillation (HCC)    Chronic combined systolic and diastolic congestive heart failure (HCC)    Arthritis of right knee    Alcoholism in remission ([facility]    Moderate protein-calorie malnutrition (HCC)    Chronic kidney disease, stage III (moderate) (HCC)    Sepsis ([facility]    C. difficile colitis    Acidosis    HELENA (acute kidney injury) ([facility]       CHIEF COMPLAINT: Abdominal Ascites      Current Facility-Administered Medications:     albumin human 25 % IV solution 25 g, 25 g, Intravenous, Once, [employer], DO, 25 g at 01/05/20 1213    Current Outpatient Medications:     lisinopril (PRINIVIL;ZESTRIL) 5 MG tablet, Take 1 tablet by mouth daily, Disp: 30 tablet, Rfl: 3    bumetanide (BUMEX) 1 MG tablet, 1 mg Five days a week, Disp: , Rfl:     blood glucose monitor kit and supplies, Test 1x  times a day for blood sugar E11.8 Glucometer and supplies that are covered by his insurance, Disp: 1 kit, Rfl: 0    blood glucose monitor strips, Test 1x  times a day for blood sugar E11.8 Glucometer and supplies that are covered by his insurance, Disp: 100 strip, Rfl: 3    Lancet Device MISC, Test 1x  times a day for blood sugar E11.8 Glucometer and supplies that are covered by his insurance, Disp: 100 Device, Rfl: 3    carvedilol (COREG) 12.5 MG tablet, Take 1 tablet by mouth 2 times daily (Patient taking differently: Take 12.5 mg by mouth 2 times daily Indications: picking up this morning, understands to check BP prior to giving

## 2020-01-05 NOTE — BRIEF OP NOTE
Brief Postoperative Note  ______________________________________________________________      PARACENTESIS BRIEF PROCEDURE NOTE    Patient Name: Steffany Box. Medical Record Number: [id number]  Date: 1/5/20   Time: 12:47 PM   Room/Bed: 15/15    Pre-operative Diagnosis: Ascites    Post-operative Diagnosis: Ascites    H and P reviewed prior to the procedure without change. Sonographic images were saved and stored in PACS. See radiology dictation in PACs for image review and additional procedural information. Performed by: BRANDY West under indirect supervision by Escobar Pacheco II, MD.    Procedure: Prior to start of procedure, routine scanning of all four abdominal quadrants was performed using real-time ultrasound and revealed sufficient amount of ascites fluid present. Decision was made to proceed with procedure. After obtaining consent, the patient was placed in the supine position with the head of the bed slightly elevated and the appropriate landmarks were identified. The skin over the puncture site in the right upper quadrant region was prepped with betadine and draped in a sterile fashion. Local anesthesia was obtained by infiltration using 1% Lidocaine without epinephrine. A paracentesis catheter was then advanced into the abdominal cavity over a needle and the needle was withdrawn. Fluid return was dark yellow colored. A total volume of 6350 mL was withdrawn and was processed in accordance with the ordering provider(s) requests (see Epic Orders--> Labs for laboratory order details). The catheter was then withdrawn and a sterile dressing was placed over the site. The patient tolerated the procedure well. Complications: None. Estimated Blood Loss: < 10 cc.         Electronically signed by BRANDY eWst   DD: 1/5/20  12:47 PM

## 2020-01-06 PROBLEM — M17.11 ARTHRITIS OF RIGHT KNEE: Status: RESOLVED | Noted: 2017-03-16 | Resolved: 2020-01-01

## 2020-01-06 PROBLEM — H35.3290 EXUDATIVE AGE-RELATED MACULAR DEGENERATION (HCC): Status: RESOLVED | Noted: 2017-02-16 | Resolved: 2020-01-01

## 2020-01-06 PROBLEM — A41.9 SEPSIS (HCC): Status: RESOLVED | Noted: 2019-09-23 | Resolved: 2020-01-01

## 2020-01-06 NOTE — PROGRESS NOTES
bibasilar atelectasis. Xr Chest Portable    Result Date: 2020  Patient MRN: [id number] : 1935 Age:  82 years Gender: Male Order Date: 2020 11:00 AM Exam: XR CHEST PORTABLE Number of Images: 1 view Indication:   eval for pna eval for pna Comparison: XR CHEST PORTABLE 2019 Findings: Stable cardiomegaly. There is a left cardiac pacer/ICD The lung fields are unremarkable. There are atherosclerotic calcifications of the thoracic aorta. No acute cardiopulmonary disease process is identified. Us Guided Paracentesis    Result Date: 2020  Patient MRN:  [id number] : 1935 Age: 82 years Gender: Male Order Date:  2020 12:15 PM EXAM: US GUIDED PARACENTESIS NUMBER OF IMAGES:  8 INDICATION: R18.8 Other ascites Other ascites COMPARISON: None PARACENTESIS: Prior to start of procedure, routine scanning of the abdomen was performed using real-time ultrasound and revealed moderate amount ascites detected. Images were saved into PACs. After obtaining images of the abdomen, it was determined that there is ample amount of fluid to be removed safely. . After obtaining informed consent a time out occurred at 1236 hours, ultrasound was performed demonstrating ascites. Routine ultrasound and Doppler ultrasound were used. Using direct real-time ultrasound imaging  access was obtained. An image was stored and copy was transmitted to PACS. After obtaining informed consent and after the routine sterile prep and drape and after the administration of a local anesthesia, a system of needles and cannulas was guided by ultrasound control into right upper quadrant of the peritoneal cavity. Subsequently with real-time guidance a Pig-tailed 6 Fr. Safe-T-Centesis Drainage Catheter was inserted at right upper quadrant. Peritoneal access was achieved. The catheter was removed at the end of the procedure. The patient tolerated the procedure well. There were no complications.   The patient was released in stable condition. A total of 6350 mL of cloudy dark yellow colored fluid was removed. Successful paracentesis. . This examination was performed and dictated by Shannon Oviedo PA-C with indirect supervision and Ruddy Thurman MD  reviewed and concurred with the findings. Assessment:    Patient Active Problem List   Diagnosis    CAD (coronary artery disease)    Type 2 diabetes mellitus with complication ([address]    Vitamin D deficiency    Renal insufficiency    Primary osteoarthritis of both knees    Atrial fibrillation (HCC)    Chronic combined systolic and diastolic congestive heart failure ([address]    Alcoholism in remission ([address]    Moderate protein-calorie malnutrition (HCC)    Chronic kidney disease, stage III (moderate) (HCC)    SBP (spontaneous bacterial peritonitis) ([address]       Plan:I spoke with Dr Doris Yi.  He will see patient reguarding SBP          Electronically signed by Orestes Casey MD on 1/6/2020 at 5:33 PM

## 2020-01-06 NOTE — CONSULTS
ON 2020] carvedilol  6.25 mg Oral BID WC    sodium chloride flush  10 mL Intravenous 2 times per day    digoxin  125 mcg Oral Daily    Vitamin D  1,000 Units Oral Daily    multivitamin  1 tablet Oral Daily     Continuous Infusions:  PRN Meds:sodium chloride flush, acetaminophen, fentanNYL **OR** fentanNYL    Allergies:  Patient has no known allergies. Social History:   Social History     Socioeconomic History    Marital status:      Spouse name: None    Number of children: None    Years of education: None    Highest education level: None   Occupational History    None   Social Needs    Financial resource strain: None    Food insecurity:     Worry: None     Inability: None    Transportation needs:     Medical: None     Non-medical: None   Tobacco Use    Smoking status: Former Smoker     Years: 40.00     Last attempt to quit: 1999     Years since quittin.0    Smokeless tobacco: Never Used   Substance and Sexual Activity    Alcohol use: Yes     Comment: socially    Drug use: No    Sexual activity: None   Lifestyle    Physical activity:     Days per week: None     Minutes per session: None    Stress: None   Relationships    Social connections:     Talks on phone: None     Gets together: None     Attends [employer] service: None     Active member of club or organization: None     Attends meetings of clubs or organizations: None     Relationship status: None    Intimate partner violence:     Fear of current or ex partner: None     Emotionally abused: None     Physically abused: None     Forced sexual activity: None   Other Topics Concern    None   Social History Narrative    None     Tobacco: Pack year  Alcohol: Stopped 20 years ago  Pets: No  Travel: No    Family History:       Problem Relation Age of Onset    Stroke Mother    . Otherwise non-pertinent to the chief complaint. REVIEW OF SYSTEMS:    CONSTITUTIONAL:  No chills, fevers or night sweats.  No loss of weight. EYES:  No double vision or drainage from eyes, ears or throat. HEENT:  No neck stiffness. No dysphagia. No drainage from eyes, ears or throat  RESPIRATORY:  No cough, productive sputum or hemoptysis. CARDIOVASCULAR:  No chest pain, palpitations, orthopnea or dyspnea on exertion. GASTROINTESTINAL: See history of present illness  GENITOURINARY:  No frequency burning dysuria or hematuria. INTEGUMENT/BREAST:  No rash or breast masses. HEMATOLOGIC/LYMPHATIC:  No lymphadenopathy or blood dyscrasics. ALLERGIC/IMMUNOLOGIC:  No anaphylaxis. ENDOCRINE:  No polyuria or polydipsia or temperature intolerance. MUSCULOSKELETAL:  No myalgia or arthralgia. Full ROM. NEUROLOGICAL:  No focal motor sensory deficit. BEHAVIOR/PSYCH:  No psychosis. PHYSICAL EXAM:    Vitals:    BP (!) 92/52   Pulse 77   Temp 98.4 °F (36.9 °C) (Oral)   Resp 16   Ht 5' 9\" (1.753 m)   Wt 142 lb 8 oz (64.6 kg)   SpO2 96%   BMI 21.04 kg/m²   Constitutional: The patient is awake, alert, and oriented. Skin: Warm and dry. No rashes were noted. HEENT: Eyes show round, and reactive pupils. No jaundice. Moist mucous membranes, no ulcerations, no thrush. Neck: Supple to movements. No lymphadenopathy. Chest: No use of accessory muscles to breathe. Symmetrical expansion. Auscultation reveals no wheezing, crackles, or rhonchi. Pacer defibrillator  Cardiovascular: S1 and S2 are rhythmic and regular. No murmurs appreciated. Abdomen: Positive bowel sounds to auscultation. Tender to palpation. No masses felt. No hepatosplenomegaly. Ascites  Extremities: No clubbing, no cyanosis, no edema.   Musculoskeletal: Equal and symmetrical  Neurological: No focal  Lines: peripheral      CBC+dif:  Recent Labs     01/05/20  1011 01/06/20  0436   WBC 20.9* 18.9*   HGB 12.6 10.2*   HCT 36.6* 29.1*   MCV 87.6 86.6    271   NEUTROABS 18.31*  --      Lab Results   Component Value Date    CRP 0.2 03/30/2018     No results found for: [facility]  Lab Results   Component Value Date    SEDRATE 34 (H) 07/01/2015     Lab Results   Component Value Date    ALT 18 01/06/2020    AST 29 01/06/2020    ALKPHOS 74 01/06/2020    BILITOT 2.0 (H) 01/06/2020     Lab Results   Component Value Date     01/06/2020    K 3.8 01/06/2020    K 4.5 09/23/2019    CL 98 01/06/2020    CO2 23 01/06/2020    BUN 57 01/06/2020    CREATININE 1.7 01/06/2020    GFRAA 47 01/06/2020    LABGLOM 39 01/06/2020    GLUCOSE 111 01/06/2020    GLUCOSE 110 05/30/2012    PROT 4.9 01/06/2020    LABALBU 2.6 01/06/2020    LABALBU 4.2 05/30/2012    CALCIUM 8.1 01/06/2020    BILITOT 2.0 01/06/2020    ALKPHOS 74 01/06/2020    AST 29 01/06/2020    ALT 18 01/06/2020       Lab Results   Component Value Date    PROTIME 20.9 01/06/2020    PROTIME 17.9 09/09/2019    INR 1.7 01/06/2020       Lab Results   Component Value Date    TSH 2.900 09/24/2019       Lab Results   Component Value Date    COLORU ZAY 01/05/2020    PHUR 5.0 01/05/2020    WBCUA NONE 08/27/2019    RBCUA 1-3 08/27/2019    BACTERIA RARE 08/27/2019    CLARITYU Clear 01/05/2020    SPECGRAV 1.025 01/05/2020    LEUKOCYTESUR Negative 01/05/2020    UROBILINOGEN 0.2 01/05/2020    BILIRUBINUR SMALL 01/05/2020    BLOODU Negative 01/05/2020    GLUCOSEU Negative 01/05/2020       No results found for: YQG1CCR, BEART, A8YCSZKH, PHART, THGBART, ZHZ4OFR, PO2ART, PHE8UPU  Radiology:  CT ABDOMEN PELVIS WO CONTRAST Additional Contrast? None   Final Result   1. Abnormal gallbladder wall thickening and low density within the   gallbladder fossa which is difficult to evaluate without intravenous   contrast. Considerations include pericholecystic abscess, edema from   cirrhosis, as well as mass. Right upper quadrant ultrasound or   contrast-enhanced study would be useful. 2.  Chronic liver disease and probable cirrhosis. 3.  Multiple hepatic hypodensities, unchanged from prior and   statistically cysts.  Follow-up with contrast-enhanced CT or MRI could

## 2020-01-07 PROBLEM — E43 SEVERE PROTEIN-CALORIE MALNUTRITION (HCC): Chronic | Status: ACTIVE | Noted: 2019-09-03

## 2020-01-07 PROBLEM — E43 SEVERE PROTEIN-CALORIE MALNUTRITION (HCC): Chronic | Status: ACTIVE | Noted: 2020-01-01

## 2020-01-07 NOTE — PLAN OF CARE
Problem: Malnutrition  (NI-5.2)  Goal: Food and/or Nutrient Delivery  Pt will tolerate diet and ONS with at least 50-75% intake at meals  Description  Individualized approach for food/nutrient provision.   Outcome: Met This Shift

## 2020-01-07 NOTE — PROGRESS NOTES
Partially visualized intracardiac leads. Coronary artery calcifications. ABDOMEN/PELVIS Liver: Nodular contours of the liver with several hypodensities. Hypodensity in the gallbladder fossa which is difficult to differentiate from the gallbladder and gallbladder wall. Gallbladder: Diffusely thickened with gallstones. It is difficult to differentiate the gallbladder wall superiorly from the liver. Spleen: Unremarkable. Pancreas: Unremarkable. Adrenals: Unremarkable. Kidneys: Probable cysts bilaterally. No hydronephrosis. Bowel: No evidence for obstruction. Small liquid stool within the rectum. The remainder of the colon is predominantly decompressed. The appendix is not visualized. Presumed duodenal diverticulum projecting medially from the second portion and superiorly from the third portion. Urinary bladder: Unremarkable. Reproductive: Prostatic calcifications. Lymph nodes: No evidence of adenopathy. Vasculature: Diffuse abdominal aortic and mesenteric atherosclerotic calcifications. Ectasia of the infrarenal abdominal aorta measuring up to 2.7 cm. Peritoneum/retroperitoneum: Small volume ascites and mesenteric edema. Osseous structure/soft tissue: No acute findings. Degenerative changes and osseous demineralization. 1.  Abnormal gallbladder wall thickening and low density within the gallbladder fossa which is difficult to evaluate without intravenous contrast. Considerations include pericholecystic abscess, edema from cirrhosis, as well as mass. Right upper quadrant ultrasound or contrast-enhanced study would be useful. 2.  Chronic liver disease and probable cirrhosis. 3.  Multiple hepatic hypodensities, unchanged from prior and statistically cysts. Follow-up with contrast-enhanced CT or MRI could be considered for definitive diagnosis. 4.  Small volume ascites and mesenteric edema. 5.  Small amount of liquid stool within the rectum. 6.  Probable left renal cysts which could be further evaluated with MRI.  7. Trace effusions and probable bibasilar atelectasis. Xr Chest Portable    Result Date: 2020  Patient MRN: [id number] : 1935 Age:  82 years Gender: Male Order Date: 2020 11:00 AM Exam: XR CHEST PORTABLE Number of Images: 1 view Indication:   eval for pna eval for pna Comparison: XR CHEST PORTABLE 2019 Findings: Stable cardiomegaly. There is a left cardiac pacer/ICD The lung fields are unremarkable. There are atherosclerotic calcifications of the thoracic aorta. No acute cardiopulmonary disease process is identified. Us Guided Paracentesis    Result Date: 2020  Patient MRN:  [id number] : 1935 Age: 82 years Gender: Male Order Date:  2020 12:15 PM EXAM: US GUIDED PARACENTESIS NUMBER OF IMAGES:  8 INDICATION: R18.8 Other ascites Other ascites COMPARISON: None PARACENTESIS: Prior to start of procedure, routine scanning of the abdomen was performed using real-time ultrasound and revealed moderate amount ascites detected. Images were saved into PACs. After obtaining images of the abdomen, it was determined that there is ample amount of fluid to be removed safely. . After obtaining informed consent a time out occurred at 1236 hours, ultrasound was performed demonstrating ascites. Routine ultrasound and Doppler ultrasound were used. Using direct real-time ultrasound imaging  access was obtained. An image was stored and copy was transmitted to PACS. After obtaining informed consent and after the routine sterile prep and drape and after the administration of a local anesthesia, a system of needles and cannulas was guided by ultrasound control into right upper quadrant of the peritoneal cavity. Subsequently with real-time guidance a Pig-tailed 6 Fr. Safe-T-Centesis Drainage Catheter was inserted at right upper quadrant. Peritoneal access was achieved. The catheter was removed at the end of the procedure. The patient tolerated the procedure well. There were no complications.   The patient was released in stable condition. A total of 6350 mL of cloudy dark yellow colored fluid was removed. Successful paracentesis. . This examination was performed and dictated by Garrick Desai PA-C with indirect supervision and Charlene Stratton MD  reviewed and concurred with the findings. Assessment:    Patient Active Problem List   Diagnosis    CAD (coronary artery disease)    Type 2 diabetes mellitus with complication ([address]    Vitamin D deficiency    Renal insufficiency    Primary osteoarthritis of both knees    Atrial fibrillation ([address]    Chronic combined systolic and diastolic congestive heart failure ([address]    Alcoholism in remission ([address]    Severe protein-calorie malnutrition (HCC)    Chronic kidney disease, stage III (moderate) (HCC)    SBP (spontaneous bacterial peritonitis) ([address]       Plan:IV Rocephin,SPA. continue anticoagulation          Electronically signed by Yanira Sanchez MD on 1/7/2020 at 4:32 PM

## 2020-01-07 NOTE — PROGRESS NOTES
[address] [place] [address] [facility]  Progress Note    CC: feeling better today  Abdomen less tender    Face to face encounter   SUBJECTIVE:  Patient is tolerating medications. No reported adverse drug reactions. ROS: No nausea, vomiting, diarrhea. No fever, no rash. Less abdominal pain. Patient fell this am.    Medications:  Scheduled Meds:   menthol-zinc oxide   Topical BID    carvedilol  6.25 mg Oral BID WC    lactobacillus  1 capsule Oral Daily    cefepime  2 g Intravenous Q12H    albumin human  25 g Intravenous Q6H    sodium chloride flush  10 mL Intravenous 2 times per day    digoxin  125 mcg Oral Daily    Vitamin D  1,000 Units Oral Daily    multivitamin  1 tablet Oral Daily     Continuous Infusions:   sodium chloride 100 mL/hr at 01/07/20 1025     PRN Meds:sodium chloride flush, acetaminophen, fentanNYL **OR** fentanNYL  OBJECTIVE:  Patient Vitals for the past 24 hrs:   BP Temp Temp src Pulse Resp SpO2 Weight   01/07/20 0800 (!) 97/52 98.2 °F (36.8 °C) Oral 79 18 96 % --   01/07/20 0632 -- -- -- -- -- -- 148 lb 8 oz (67.4 kg)   01/07/20 0045 (!) 91/44 98.2 °F (36.8 °C) Oral 74 16 95 % --   01/06/20 2000 (!) 106/48 97.5 °F (36.4 °C) Oral 84 18 94 % --   01/06/20 1536 (!) 92/52 98.4 °F (36.9 °C) Oral 77 16 -- --     Constitutional: The patient is awake, alert, and oriented. Skin: Warm and dry. No rashes were noted. Head: Eyes show round, and reactive pupils. No jaundice. Mouth: Moist mucous membranes, no ulcerations, no thrush. Neck: Supple to movements. No lymphadenopathy. Chest: No use of accessory muscles to breathe. Symmetrical expansion. Auscultation reveals no wheezing, crackles, or rhonchi. Cardiovascular: S1 and S2 are rhythmic and regular. No murmurs appreciated. Abdomen: Positive bowel sounds to auscultation. Tender with palpation- ascites   Extremities: No clubbing, no cyanosis, no edema.     Laboratory and Tests Review:  Lab Results   Component Value Date

## 2020-01-07 NOTE — CONSULTS
[id number] [address]                                  CONSULTATION    PATIENT NAME: Zenobia Benjamin                      :        1935  MED REC NO:   [id number]                            ROOM:       1524  ACCOUNT NO:   [de-identified]                           ADMIT DATE: 2020  PROVIDER:     Allan Rangel MD    CONSULT DATE:  2020    REASON FOR CONSULTATION:  Cirrhosis with bacterial peritonitis,  presumably SBP. INDICATION:  This is an 53-year-old male. He has a history of liver  disease, namely cirrhosis. The etiology of his liver disease is  presumably related to a previous history of alcohol. He does have a  history of congestive heart failure. He has had an ICD in place. He  presented in August with increasing abdominal girth. He was found to  have gram-negative rods specifically E. coli in his blood. The white  count in his peritoneal fluid was 1300, 45% of which were neutrophils,  but cultures of the fluid were negative. There was no evidence of a  potential source elsewhere. The etiology of his liver disease was felt to be   fatty liver disease, but alcohol probably a significant factor. The protein in the fluid at that time was 4.0, high for  cirrhosis with a wide gradient of suggestive of heart disease as the cause  of the ascites then. Given the excess of 500 neutrophils in the ascitic fluid  and a positive blood culture, SBP was presumed despite the negative  cultures. Therapy for that was complicated subsequently by C.  difficile, which was successfully treated with vancomycin. He was admitted to the hospital on this occasion with abdominal pain. He was found to have a white blood cell count of 20,000. Paracentesis  revealed 18,000 white blood cells, the vast majority of which were  neutrophils.   Gram-negative rods were noted in the peritoneal fluid, but  cultures of the blood have been negative. He has been seen by Dr. Jojo Sutton and is on antibiotic therapy. His creatinine at the time of his  previous discharge was 1.4. On admission it was 2.0. It has dropped  down since admission to 1.7. His INR 1.7. His total bilirubin 2.0. PAST MEDICAL HISTORY:  Remarkable for heart disease, ICD, coronary  disease, history of atrial fibrillation, heart failure, hypertension,  and apparent myocardial infarction. PAST SURGICAL HISTORY:  Includes percutaneous intervention with stent  placement, defibrillator placement, paracentesis, pacemaker, and back  surgery. MEDICATIONS AT HOME:  Lisinopril, Bumex, warfarin, Januvia, Ranexa,  Lipitor, carvedilol, Lanoxin, and vitamin D. ALLERGIES:  No listed drug allergies. SOCIAL HISTORY:  He is . Former smoker. Apparently consumes  some alcohol in small volumes on a social basis. PHYSICAL EXAMINATION:  GENERAL:  He is alert and oriented. He still has some residual ascites. Normotensive. He is not clinically jaundiced. VITAL SIGNS:  His blood pressure is 106/48 with room air saturation of  94%. His temperature 97.5. NECK:  Neck veins are just over the clavicle, it is at 30 degrees. LUNGS:  Lung fields clear. CARDIAC:  Heart tones normal.  ABDOMEN:  He has mild tenderness. No guarding. EXTREMITIES:  Normal.    This truly appears to be an episode of spontaneous bacterial  Peritonitis. There is no evidence of a secondary cause and it  is only monobacterial.  Now, he would seem to be a low risk patient for  the development of SBP based on his high total protein content on the  index paracentesis back in September. However, now that he has had what  appears in all probability to be a second episode of spontaneous  bacterial peritonitis, he will require long-term prophylaxis. Given his  history of C. difficile, Bactrim preferable over ciprofloxacin. Will  administer albumin in the setting of SBP and renal failure.   He

## 2020-01-07 NOTE — PLAN OF CARE
Problem: Falls - Risk of:  Goal: Will remain free from falls  Description  Will remain free from falls  Outcome: Not Met This Shift

## 2020-01-07 NOTE — PROGRESS NOTES
1/7/2020  4:32 PM      Nutrition Assessment    Type and Reason for Visit: Initial, Positive Nutrition Screen    Nutrition Recommendations: Liberalize diet from Renal diet to 2 gm Na Diet, as K+ and Phos levels WNL and pt eating poorly. Will initiate ONS w/all meals    Nutrition Assessment: Pt meets criteria for severe malnutrition AEB <75% needs met >1 mo, muscle loss and 20% wt loss in 10 mo. Current SBP, possible early hepatorenal syndrome and hx cirrhosis contributing to compromise. Recommend liberalize from Renal diet, as  PO poor and K+ levels WNL. Suggest Low Na Diet     Malnutrition Assessment:  · Malnutrition Status: Meets the criteria for severe malnutrition  · Context: Chronic illness  · Findings of the 6 clinical characteristics of malnutrition (Minimum of 2 out of 6 clinical characteristics is required to make the diagnosis of moderate or severe Protein Calorie Malnutrition based on AND/ASPEN Guidelines):  1. Energy Intake-Less than or equal to 75% of estimated energy requirement, Greater than or equal to 1 month    2. Weight Loss-20% loss or greater, in 1 year(10 months)  3. Fat Loss-Mild subcutaneous fat loss, Triceps  4. Muscle Loss-Moderate muscle mass loss, Calf (gastrocnemius), Clavicles (pectoralis and deltoids)  5. Fluid Accumulation-Unable to assess(hepatic and renal component), Ascites, Extremities  6.   Strength-Not measured    Nutrition Risk Level: High    Nutrient Needs:  · Estimated Daily Total Kcal:  (MSJ x 1.3)  · Estimated Daily Protein (g):  (1.3-1.5 g/kg) as pennie w/hepatic and renal fxn  · Estimated Daily Total Fluid (ml/day):  (1 ml/yaz)    Nutrition Diagnosis:   · Problem: Severe malnutrition, In context of chronic illness  · Etiology: related to Early satiety(2/2 ascites and anorexia)     Signs and symptoms:  as evidenced by Intake 0-25%, Diet history of poor intake, Weight loss greater than or equal to 20% in 1 year, Mild loss of subcutaneous fat, Moderate muscle loss    Objective Information:  · Nutrition-Focused Physical Findings: anorexia, fatigue/lethargy, poor dentition, gross ascites, +1 edema, +BS,   · Wound Type: Pressure Ulcer, Stage II, Skin Tears(per Wound care)  · Current Nutrition Therapies:  · Oral Diet Orders: Renal   · Oral Diet intake: 1-25%  · Anthropometric Measures:  · Ht: 5' 9\" (175.3 cm)   · Current Body Wt: 148 lb (67.1 kg)(1/7 bedwt)  · Admission Body Wt: 145 lb (65.8 kg)(1/5 bedwt)  · Usual Body Wt: 181 lb (82.1 kg)(per EMR in March 2019)  · % Weight Change:  ,  20% loss in 10 mos  · Ideal Body Wt: 160 lb (72.6 kg), % Ideal Body 91% (admit wt)  · BMI Classification: BMI 18.5 - 24.9 Normal Weight    Nutrition Interventions:   Modify current diet, Start ONS(Recommend liberalize from Renal diet to promote improving PO and d/t normal K+/Phos levels)  Continued Inpatient Monitoring, Education not appropriate at this time    Nutrition Evaluation:   · Evaluation: Goals set   · Goals: PO at least 50-75% at meals/ONS    · Monitoring: Meal Intake, Supplement Intake, Wound Healing, I&O, Skin Integrity, Mental Status/Confusion, Weight, Pertinent Labs, Monitor Bowel Function      Electronically signed by Teddy Arredondo RD, CNSC, LD on 1/7/20 at 4:32 PM    Contact Number: [phone]

## 2020-01-08 NOTE — CONSULTS
Inpatient Cardiology Consultation      Reason for Consult:  CHF and Ascites     Consulting Physician: Dr. Yasemin Beckham     Requesting Physician:  Dr. Crystal Rush     Date of Consultation: 1/8/2020    HISTORY OF PRESENT ILLNESS:   Mr. Judie Tony is an 82year old  male who follows with Dr. Adelita Kussmaul. He was most recently seen in the office with Dr. Adelita Kussmaul on 12/05/2019. His medical history includes DM, ischemic cardiomyopathy s/p ICD and PCI, Chronic Atrial Fibrillation on chronic anticoagulation with Coumadin, HTN, remote tobacco abuse, and dyslipidemia. Mr. Judie oTny presented to [facility] ED on 01/05/2020 with complaints of fall and abdominal edema. He states that prior to presentation, \"recently\" he fell from a standing position due to dizziness and BLE weakness. He states \"I think I passed out half way\". He states that he had LOC for several seconds. He states that he has chronic dizziness with change of position. States that he has maintained good PO intake. Denies chest pain and WELLER. Denies orthopnea and PND. Complains of increasing abdominal edema. According to ED documentation he was unable to eat due to increasing abdominal edema and was scheduled for an outpatient paracentesis. Upon arrival to the ED his VS were 98.4-93-18-99/% on 4 liters via nasal cannula. EKG A Fib with CVR. Rapid influenza screen was negative. WBC 20.9. H&H 12.6/36.6. BUN/SCr 51/2. Ammonia 11.2. Total Bilirubin 3.2. Albumin 3. INR 1.8. He underwent a CT of the abdomen and then a paracentesis with removal of 6350mL of cloudy dark yellow fluid that was positive for Ecoli. ID and GI were consulted. He was admitted to a telemetry monitored unit. Cardiology was consulted by Dr. Crystal Rush for management of CHF and ascites. Please note: past medical records were reviewed per electronic medical record (EMR) - see detailed reports under Past Medical/ Surgical History. Past Medical and Surgical History:    1. Hypertension.   2. Chronic CHF with depressed EF. 3. Hyperlipidemia. Total cholesterol 114, triglycerides 115, HDL 31, LDL 60 (17)  4. Chronic AF, .  Chronic Coumdin  5. Diabetes mellitus. 6. Remote MI history, . 7. Family History: Mother had stroke and father pneumonia.  at 80 and 80 respectively. 8. Cigarette abuse, 60 pack years. Quit .  9. ICD implant, 2012 (Dr. Jeyson Armando, [facility]). 10. Chronic anticoagulation with Coumadin. 11. History of MI, . 12. Cardiac cath, 2003 - [facility]. LM 25%. LAD 40%. Proximal LAD 40%. Diagonal 95%. CX mild plaque. RCA mild plaque. LV hypokinesis anterolateral wall. EF normal. Medical therapy. 13. PCI, . Stent RCA. 14. Cardiac cath, 2010. Moderately severe single vessel disease LAD. Status post PCI distal RCA. Recommend IVUS and possible rotational atherectomy and stenting of the mid LAD. 15. Dyslipidemia. 16. Lexiscan MPS 10/17/2016. EF 26%. Akinesis inferoapical and distal anterior segments. Large fixed inferior defect consistent with scar. Moderately severe mid anterior reversible defect. 17. Echo 10/17/2016. Dilated LV. EF 20%. AF. Biatrial dilatation. Mild PHTN. 18. PRO-BNP 10/26/2016 1265. 19. Cardiac OP follow up 2016. /60. Symptomatic improvement. 20. Echocardiogram, 2017, EF biplane = 44%.  Diffuse hypokinesis.  Diastolic dysfunction.  Atrial fibrillation.    21. Echocardiogram, 2017. 22. Labs, 2017. Sodium and potassium normal. BUN 19, creatinine 1.1. Pro BNP 1271.    23. Labs, 2017. BUN 23 creatinine 1.2. GFR 58.  24. OP cardiac assessment, 2017. Asymptomatic. No edema. 25. Device interrogation, [facility], 2017. Battery status normal and shows no significant depletion. Counters since 2017 6 NSVT noted. Sensing appropriate. Lead impedance trends normal.  V pacing 3%. Continue 3 month remotes and yearly in-clinic visit. 26. Echo, 2019. EF 56%. Left atrium severely dilated.  Atrial severe left ventricular systolic dysfunction and estimated ejection fraction of approximately 25% a concomitant echocardiogram demonstrating a dilated left ventricular chamber with severe left ventricular systolic dysfunction estimated ejection fraction of 20% with biatrial enlargement. He has subsequently undergone placement of an implantable cardioverter defibrillator. He additionally has a history of permanent atrial fibrillation, hypertension, diabetes, hyperlipidemia, chronic obstructive lung disease and alcoholic cirrhosis. He has most recently undergone reassessment of left ventricular systolic function with an echocardiogram in August, 2019 demonstrating a dilated left ventricular chamber with regional wall motion and body of the inferior and inferoseptal segments with severe left ventricular systolic dysfunction estimated ejection fraction of 25% with severe left atrial enlargement, moderate mitral regurgitation and mild pulmonary hypertension with a right ventricular systolic pressure of approximately 40 mmHg. He was subsequently hospitalized in August, 2019 with sepsis felt secondary to spontaneous bacterial peritonitis. He remained clinically compensated at time of his outpatient assessment approximately 1 month earlier. Within the past week, he has noted the onset of increasing lethargy and fatigue associated with anorexia and fever and chills and following admission underwent a 6 L paracentesis in the face of acute kidney injury superimposed upon his chronic kidney disease with subsequent gram-negative bacteremia secondary to spontaneous bacterial peritonitis of a recurrent nature. On this basis, chronic antibiotic suppression has been recommended. He denies any additional manifestations of anginal-like chest discomfort or other ischemic equivalents on that of his ascites manifests no evidence of significant volume overload or decompensated systolic heart failure.   In the face of his acute kidney injury potentially related to hepatorenal syndrome, he is angiotensin-converting enzyme inhibitor has been withheld with an improvement of his serum creatinine, although presently not to that of his baseline. On examination, he appears in no present discomfort nor distress. He is jaundiced and appears chronically ill. He is hemodynamically stable with vital signs as documented. Jugular venous pressure appears approximately 6 cm with no identified carotid bruits. Lung fields are clear to auscultation. Cardiac examination is able for an irregular rhythm consistent with of his atrial fibrillation with no present identified significant cardiac murmur. A benign abdominal examination is present with persistent ascites noted and no significant edema present. Diagnostic Assessment and Plan: On a clinical basis, the patient presents with recurrent spontaneous bacterial peritonitis in the face of known alcoholic cirrhosis with this the origin of his ascites rather than that of congestive heart failure in light of his existing coronary atherosclerosis and ischemic cardiomyopathy with severe left ventricular systolic dysfunction. Based on his recent acute kidney injury until serum creatinine levels reached that of his baseline, I would not recommend resumption of afterload reduction and based on his relative hypotension feel that the risks of conversion of his existing utensil converting enzyme inhibitor to that of sacubitril-valsartan will additionally increase his risk of hypotension. Presently from a cardiovascular standpoint, no additional assessment is indicated unless additional assessment is recommended by the infectious disease service. Continued appropriate monitoring of anticoagulation with goals of maintaining prothrombin times in the range of 2.0-2.5 times INR control will be optimal to reducing risk of embolic events.   Appropriate risk factor modification of blood pressure, diabetes and serum

## 2020-01-08 NOTE — PROGRESS NOTES
Dr. Nanette Duggan M.D. [facility])  Nurse Practitioner Progress Note    Subjective: The patient is awake and alert. No problems overnight. Denies chest pain, angina, and dyspnea. Denies abdominal pain. Tolerating diet. No nausea or vomiting.       Current Facility-Administered Medications   Medication Dose Route Frequency Provider Last Rate Last Dose    warfarin (COUMADIN) tablet 7.5 mg  7.5 mg Oral Once Naida Fleischer, APRN - CNP        cefTRIAXone (ROCEPHIN) 2 g IVPB in D5W 50ml minibag  2 g Intravenous Q24H Felicie HARSHAD Acevedo - CNS   Stopped at 01/07/20 1404    warfarin (COUMADIN) daily dosing (placeholder)   Other Daily Nanette Duggan MD        spironolactone (ALDACTONE) tablet 12.5 mg  12.5 mg Oral Daily Nanette Duggan MD   12.5 mg at 01/08/20 1721    menthol-zinc oxide (CALMOSEPTINE) 0.44-20.6 % ointment   Topical BID Nanette Duggan MD        carvedilol (COREG) tablet 6.25 mg  6.25 mg Oral BID WC Nanette Duggan MD   6.25 mg at 01/08/20 0825    lactobacillus (CULTURELLE) capsule 1 capsule  1 capsule Oral Daily María Smith MD   1 capsule at 01/08/20 0825    sodium chloride flush 0.9 % injection 10 mL  10 mL Intravenous 2 times per day Nanette Duggan MD   10 mL at 01/08/20 0825    sodium chloride flush 0.9 % injection 10 mL  10 mL Intravenous PRN Nanette Duggan MD        acetaminophen (TYLENOL) tablet 650 mg  650 mg Oral Q4H PRN Nanette Duggan MD        fentaNYL (SUBLIMAZE) injection 25 mcg  25 mcg Intravenous Q1H PRN Nanette Duggan MD   25 mcg at 01/05/20 2310    Or    fentaNYL (SUBLIMAZE) injection 50 mcg  50 mcg Intravenous Q1H PRN Nanette Duggan MD        digoxin (LANOXIN) tablet 125 mcg  125 mcg Oral Daily Nanette Duggan MD   125 mcg at 01/08/20 0825    vitamin D (CHOLECALCIFEROL) tablet 1,000 Units  1,000 Units Oral Daily Nanette Duggan MD   1,000 Units at 01/08/20 0825    multivitamin 1 tablet  1 tablet Oral Daily Nanette Duggan MD PARACENTESIS NUMBER OF IMAGES:  8 INDICATION: R18.8 Other ascites Other ascites COMPARISON: None PARACENTESIS: Prior to start of procedure, routine scanning of the abdomen was performed using real-time ultrasound and revealed moderate amount ascites detected. Images were saved into PACs. After obtaining images of the abdomen, it was determined that there is ample amount of fluid to be removed safely. . After obtaining informed consent a time out occurred at 1236 hours, ultrasound was performed demonstrating ascites. Routine ultrasound and Doppler ultrasound were used. Using direct real-time ultrasound imaging  access was obtained. An image was stored and copy was transmitted to PACS. After obtaining informed consent and after the routine sterile prep and drape and after the administration of a local anesthesia, a system of needles and cannulas was guided by ultrasound control into right upper quadrant of the peritoneal cavity. Subsequently with real-time guidance a Pig-tailed 6 Fr. Safe-T-Centesis Drainage Catheter was inserted at right upper quadrant. Peritoneal access was achieved. The catheter was removed at the end of the procedure. The patient tolerated the procedure well. There were no complications. The patient was released in stable condition. A total of 6350 mL of cloudy dark yellow colored fluid was removed. Successful paracentesis. . This examination was performed and dictated by Vanessa Dinero PA-C with indirect supervision and Melany Wu MD  reviewed and concurred with the findings.        Assessment:    Patient Active Problem List   Diagnosis Code    CAD (coronary artery disease) I25.10    Type 2 diabetes mellitus with complication ([facility] G98.7    Vitamin D deficiency E55.9    Renal insufficiency N28.9    Primary osteoarthritis of both knees M17.0    Atrial fibrillation (HCC) I48.91    Chronic combined systolic and diastolic congestive heart failure ([address] I50.42    Alcoholism in

## 2020-01-08 NOTE — PROGRESS NOTES
10:59 AM  Consult placed through [employer] for [facility] Cardiology regarding this patient.   TRUMAN Estrada/LILIAN  1/8/2020

## 2020-01-08 NOTE — PROGRESS NOTES
Says OK, pain diminished. Taking diet  Asking of discharge  WBC still at 16 K  Cr at baseline  On Rocephan,Day # 4 anitbiotics      If WBC remains elevated, retap to assess cell count  Ultimately higher dose diuretics  Long term therapy with Bactrim 1 x for prophylaxis.

## 2020-01-08 NOTE — PROGRESS NOTES
Physical Therapy    Facility/Department: [address] INTERMEDIATE 1  Initial Assessment    NAME: Beverley Arnold. : 1935  MRN: [id number]    Date of Service: 2020      Patient Diagnosis(es): The primary encounter diagnosis was SBP (spontaneous bacterial peritonitis) ([address] Diagnoses of Other ascites and Septicemia ([address] were also pertinent to this visit. has a past medical history of Arrhythmia, Atrial fibrillation ([address] CAD (coronary artery disease), Chronic kidney disease, Congestive heart disease ([address] Diabetes mellitus ([address] Hyperlipidemia, Hypertension, Hypothyroidism, Myocardial infarct ([address] Primary osteoarthritis of both knees, Septic shock ([address] and Type 2 diabetes mellitus with complication ([address] has a past surgical history that includes Coronary angioplasty with stent; Pacemaker insertion; Cardiac defibrillator placement (2012); back surgery; Tonsillectomy; and Cardiac defibrillator placement. Evaluating Therapist: Mel Yang PT    Room #:443  DIAGNOSIS: Spontaneous bacterial peritonitis  S/p fall at [facility]  Additional Pertinent History:CAD, CHF, CKD  PRECAUTIONS: falls, alarm    Social:  Pt lives with wife in a 1 floor plan 1 steps  Prior to admission independent without device     Initial Evaluation  Date:  Treatment      Short Term/ Long Term   Goals   Was pt agreeable to Eval/treatment?  yes     Does pt have pain? no     Bed Mobility  Rolling: SBA  Supine to sit: SBA  Sit to supine: NT  Scooting: SBA  independent   Transfers Sit to stand: min assist  Stand to sit: min assist  Stand pivot: NT  SBA   Ambulation    100 feet with ww with min assist with loss of balance x1 with mod assist to correct  150 feet with ww with SBA   Stair Negotiation  Ascended and descended  NT   1 steps with 1 rail with SBA   AM-PAC Raw score               16/24       Pt is alert and grossly oriented, poor recall  LE ROM: WFL  LE strength: 3+/5  Balance: fair-  Sensation: intact  Edema: none LE's  Endurance: fair  Chair alarm: yes     ASSESSMENT  Pt displays functional ability as noted in the objective portion of this evaluation. Comments/Treatment:  Pt with increased time to complete mobility. Pt states he needed to urinate. Wanted to use toilet, explained to pt he would need to use urinal secondary to need to measure urine. Pt sat edge of bed a few minutes and stated again he would like to walk in the bathroom and \"pee like a man\". Explained again to pt reason for urinal.  Pt asked a 3rd time to stand at commode. Pt then agreed to use urinal standing edge of bed but was unable to urinate. Pt unsteady in standing, walker used for safety with loss of balance when walking out doorway with mod assist to prevent fall. Pt required increased time and encouragement for mobility, was complaining that he is not allowed to do anything but when offered to walk pt he complained that he didn't want to do too much. Patient education  Pt educated on PT objectives    Patient response to education:   Pt verbalized understanding Pt demonstrated skill Pt requires further education in this area   Yes          Rehab potential is good for reaching above PT goals. Pts/ family goals   1. To walk more    Patient and or family understand(s) diagnosis, prognosis, and plan of care. PLAN  PT care will be provided in accordance with the objectives noted above. Whenever appropriate, clear delegation orders will be provided for nursing staff. Exercises and functional mobility practice will be used as well as appropriate assistive devices or modalities to obtain goals. Patient and family education will also be administered as needed. Frequency of treatments will be 2-5x/week x 5 days.     Clementine PT  [id number]

## 2020-01-09 PROBLEM — I50.22 CHRONIC SYSTOLIC HF (HEART FAILURE) (HCC): Status: ACTIVE | Noted: 2017-03-16

## 2020-01-09 NOTE — CARE COORDINATION
CASE MANAGEMENT. Mitzy oJnes Met with patient to discuss his hospital stay and discharge needs. Mr Lavelle Goncalves has no questions for me at this time. Noted that [facility] was started this am. Patient was on this med prior to discharge. No pre Shmuel Erichsen will be required. He is hopeful to discharge home today/tomorrow. Mr Lavelle Goncalves denies any home going needs. He is not interested in [place] [address]. Agrees to call Dr Lentz So when needed. Awaiting input from pcp/gi/id. Will follow along and assist with needs accordingly.

## 2020-01-09 NOTE — PROGRESS NOTES
edema.     Laboratory and Tests Review:  Lab Results   Component Value Date    WBC 16.2 (H) 01/08/2020    WBC 14.5 (H) 01/07/2020    WBC 18.9 (H) 01/06/2020    HGB 10.4 (L) 01/08/2020    HCT 30.5 (L) 01/08/2020    MCV 87.4 01/08/2020     01/08/2020     Lab Results   Component Value Date    NEUTROABS 18.31 (H) 01/05/2020    NEUTROABS 9.07 (H) 09/23/2019    NEUTROABS 8.11 (H) 09/09/2019     Lab Results   Component Value Date    CRP 0.2 03/30/2018     Lab Results   Component Value Date    SEDRATE 34 (H) 07/01/2015     Lab Results   Component Value Date    ALT 18 01/08/2020    AST 28 01/08/2020    ALKPHOS 65 01/08/2020    BILITOT 1.7 (H) 01/08/2020     Lab Results   Component Value Date     01/08/2020    K 3.7 01/08/2020    K 4.5 09/23/2019    CL 98 01/08/2020    CO2 23 01/08/2020    BUN 58 01/08/2020    CREATININE 1.5 01/08/2020    GFRAA 54 01/08/2020    LABGLOM 45 01/08/2020    GLUCOSE 155 01/08/2020    GLUCOSE 110 05/30/2012    PROT 5.8 01/08/2020    LABALBU 3.4 01/08/2020    LABALBU 4.2 05/30/2012    CALCIUM 8.6 01/08/2020    BILITOT 1.7 01/08/2020    ALKPHOS 65 01/08/2020    AST 28 01/08/2020    ALT 18 01/08/2020     Radiology:  Reviewed     Microbiology:   1/5/2020- body fluid-e.coli - pan sensitive   1/5/2020-Blood cx- no growth to date    ASSESSMENT:  · Spontaneous bacterial peritonitis   · E.coli  · Leukocytosis - improving   · History of C.diff infection     PLAN:  · On cefepime- will change to Rocephin IV for now  · Check cultures  · Monitor labs    Reva Emery MD  9:44 PM  1/8/2020

## 2020-01-09 NOTE — PROGRESS NOTES
process is identified. Us Guided Paracentesis    Result Date: 2020  Patient MRN:  [id number] : 1935 Age: 82 years Gender: Male Order Date:  2020 12:15 PM EXAM: US GUIDED PARACENTESIS NUMBER OF IMAGES:  8 INDICATION: R18.8 Other ascites Other ascites COMPARISON: None PARACENTESIS: Prior to start of procedure, routine scanning of the abdomen was performed using real-time ultrasound and revealed moderate amount ascites detected. Images were saved into PACs. After obtaining images of the abdomen, it was determined that there is ample amount of fluid to be removed safely. . After obtaining informed consent a time out occurred at 1236 hours, ultrasound was performed demonstrating ascites. Routine ultrasound and Doppler ultrasound were used. Using direct real-time ultrasound imaging  access was obtained. An image was stored and copy was transmitted to PACS. After obtaining informed consent and after the routine sterile prep and drape and after the administration of a local anesthesia, a system of needles and cannulas was guided by ultrasound control into right upper quadrant of the peritoneal cavity. Subsequently with real-time guidance a Pig-tailed 6 Fr. Safe-T-Centesis Drainage Catheter was inserted at right upper quadrant. Peritoneal access was achieved. The catheter was removed at the end of the procedure. The patient tolerated the procedure well. There were no complications. The patient was released in stable condition. A total of 6350 mL of cloudy dark yellow colored fluid was removed. Successful paracentesis. . This examination was performed and dictated by Aydin Salmeron PA-C with indirect supervision and Ml Pham MD  reviewed and concurred with the findings.          Assessment:    Patient Active Problem List   Diagnosis Code    CAD (coronary artery disease) I25.10    Essential hypertension I10    Type 2 diabetes mellitus with stage 3 chronic kidney disease, with long-term current

## 2020-01-09 NOTE — PROGRESS NOTES
[address] [place] [address] FOLLOW-UP    Name: Kd Wolf. Age: 82 y.o. Date of Admission: 1/5/2020  9:51 AM    Date of Service: 1/9/2020    Chief Complaint: Follow-up for coronary atherosclerosis, ischemic cardiomyopathy, chronic systolic heart failure, permanent atrial fibrillation, alcoholic cirrhosis with associated ascites, chronic kidney disease    Interim History: The patient presently appears stable from a cardiovascular standpoint and denies symptomatology beyond that of mild abdominal bloating. An appropriate fluid balance has been maintained with ongoing stabilization of renal function. Remains hemodynamically stable with intermittent low-grade fevers in the face of his bacterial peritonitis      Review of Systems: The remainder of a complete multisystem review including consitutional, central nervous, respiratory, circulatory, gastrointestinal, genitourinary, endocrinologic, hematologic, musculoskeletal and psychiatric are negative.     Problem List:  Patient Active Problem List   Diagnosis    CAD (coronary artery disease)    Type 2 diabetes mellitus with complication ([facility]    Vitamin D deficiency    Renal insufficiency    Primary osteoarthritis of both knees    Atrial fibrillation ([facility]    Chronic combined systolic and diastolic congestive heart failure ([facility]    Alcoholism in remission ([facility]    Severe protein-calorie malnutrition ([facility]    Chronic kidney disease, stage III (moderate) (HCC)    SBP (spontaneous bacterial peritonitis) (HCC)       Allergies:  No Known Allergies    Current Medications:  Current Facility-Administered Medications   Medication Dose Route Frequency Provider Last Rate Last Dose    lactobacillus (CULTURELLE) capsule 1 capsule  1 capsule Oral BID HARSHAD Bravo - CNP   1 capsule at 01/08/20 2015    cefTRIAXone (ROCEPHIN) 2 g IVPB in D5W 50ml minibag  2 g Intravenous Q24H HARSHAD Rosario CNS   Stopped at 01/08/20 1216    warfarin (COUMADIN) daily dosing (placeholder)   Other Daily Seth Jefferson MD        spironolactone (ALDACTONE) tablet 12.5 mg  12.5 mg Oral Daily Seth Jefferson MD   12.5 mg at 01/09/20 0555    menthol-zinc oxide (CALMOSEPTINE) 0.44-20.6 % ointment   Topical BID Seth Jefferson MD        carvedilol (COREG) tablet 6.25 mg  6.25 mg Oral BID WC Seth Jefferson MD   6.25 mg at 01/08/20 1703    lactobacillus (CULTURELLE) capsule 1 capsule  1 capsule Oral Daily [place] MD Sergio   1 capsule at 01/08/20 0825    sodium chloride flush 0.9 % injection 10 mL  10 mL Intravenous 2 times per day Seth Jefferson MD   10 mL at 01/08/20 2016    sodium chloride flush 0.9 % injection 10 mL  10 mL Intravenous PRN Seth Jefferson MD        acetaminophen (TYLENOL) tablet 650 mg  650 mg Oral Q4H PRN Seth Jefferson MD        fentaNYL (SUBLIMAZE) injection 25 mcg  25 mcg Intravenous Q1H PRN Seth Jefferson MD   25 mcg at 01/05/20 2310    Or    fentaNYL (SUBLIMAZE) injection 50 mcg  50 mcg Intravenous Q1H PRN Seth Jefferson MD        digoxin [facility]) tablet 125 mcg  125 mcg Oral Daily Seth Jefferson MD   125 mcg at 01/08/20 0825    vitamin D (CHOLECALCIFEROL) tablet 1,000 Units  1,000 Units Oral Daily Seth Jefferson MD   1,000 Units at 01/08/20 0825    multivitamin 1 tablet  1 tablet Oral Daily Seth Jefferson MD   1 tablet at 01/08/20 0825         Physical Exam:  BP (!) 106/54   Pulse 75   Temp 98.2 °F (36.8 °C) (Oral)   Resp 16   Ht 5' 9\" (1.753 m)   Wt 148 lb 4.8 oz (67.3 kg)   SpO2 95%   BMI 21.90 kg/m²   Weight change: -8 oz (-0.227 kg)  Wt Readings from Last 3 Encounters:   01/09/20 148 lb 4.8 oz (67.3 kg)   12/05/19 166 lb (75.3 kg)   11/07/19 167 lb 6.4 oz (75.9 kg)     The patient is awake, alert and in no discomfort or distress. No gross musculoskeletal deformity is present. No significant skin or nail changes are present. Gross examination of head, eyes, nose and throat are negative.  Jugular venous pressure is approximately 6 cm and no carotid bruits are present. Normal respiratory effort is noted with no accessory muscle usage present. Lung fields are clear to ascultation. Cardiac examination is notable for an irregular rhythm with no palpable thrill. No gallop rhythm and a soft systolic murmur at the left sternal border are identified. A benign abdominal examination is present with abdominal distention and probable ascites no masses or organomegaly. Intact pulses are present throughout all extremities and no peripheral edema is present. No focal neurologic deficits are present. Intake/Output:    Intake/Output Summary (Last 24 hours) at 1/9/2020 0641  Last data filed at 1/9/2020 0156  Gross per 24 hour   Intake 660 ml   Output 800 ml   Net -140 ml     I/O this shift:  In: -   Out: 350 [Urine:350]    Laboratory Tests:  Lab Results   Component Value Date    CREATININE 1.3 (H) 01/09/2020    BUN 53 (H) 01/09/2020     01/09/2020    K 3.6 01/09/2020    CL 98 01/09/2020    CO2 24 01/09/2020     No results for input(s): CKTOTAL, CKMB in the last 72 hours.     Invalid input(s): TROPONONI  Lab Results   Component Value Date     (H) 12/27/2012     Lab Results   Component Value Date    WBC 16.1 01/09/2020    RBC 3.26 01/09/2020    HGB 9.6 01/09/2020    HCT 28.4 01/09/2020    MCV 87.1 01/09/2020    MCH 29.4 01/09/2020    MCHC 33.8 01/09/2020    RDW 13.6 01/09/2020     01/09/2020    MPV 10.7 01/09/2020     Recent Labs     01/07/20  0358 01/08/20  1230 01/09/20  0230   ALKPHOS 61 65 65   ALT 17 18 18   AST 29 28 28   PROT 5.0* 5.8* 5.3*   BILITOT 1.9* 1.7* 1.6*   LABALBU 2.8* 3.4* 2.9*     Lab Results   Component Value Date    MG 1.9 09/26/2019     Lab Results   Component Value Date    PROTIME 26.4 01/09/2020    PROTIME 17.9 09/09/2019    INR 2.2 01/09/2020     Lab Results   Component Value Date    TSH 2.900 09/24/2019     No components found for: CHLPL  Lab Results   Component Value Date TRIG 71 06/06/2019    TRIG 109 08/21/2018    TRIG 81 03/30/2018     Lab Results   Component Value Date    HDL 32 06/06/2019    HDL 27 08/21/2018    HDL 28 03/30/2018     Lab Results   Component Value Date    LDLCALC 52 06/06/2019    LDLCALC 59 08/21/2018    LDLCALC 56 03/30/2018       Cardiac Tests:  Telemetry findings reviewed: atrial fibrillation with a mean ventricular response of approximately 70 bpm no new tachy/bradyarrhythmias overnight      ASSESSMENT / PLAN: On a clinical basis, the patient presently appears stable from a cardiovascular standpoint with no significant clinical evidence of volume overload and possible mild recurrence of his ascites. Renal function in the face of prerenal azotemia has reached relative baseline with plans of resumption of afterload reduction in the form of lisinopril with dose modification on the basis of his relative hypotension and needs of continued careful monitoring of his renal function and electrolytes. No additional alteration of his cardiovascular medical regimen or assessment are anticipated during hospitalization with additional management deferred to primary care as well as that of the infectious disease and gastroenterology services. We will further evaluate him during hospitalization should additional cardiovascular difficulties or concerns arise with arrangements made for follow-up with his primary cardiologist, [facility] in approximately 2 weeks. Note: This report was completed utilizing computer voice recognition software. Every effort has been made to ensure accuracy, however; inadvertent computerized transcription errors may be present. Eleanora Hodgkin.  Omid [place], [address] [facility]

## 2020-01-10 NOTE — PROGRESS NOTES
The patient presently remains stable from a cardiovascular standpoint with interim assessment by primary care and the intent of converting afterload reduction to sacubitril-valsartan in spite of contrary recommendations of cardiology noted. Based on his present relative hypotension I continue to feel that this remains inadvisable continued to recommend the use of his existing lisinopril at decreased dosage at this time attempt to assist in continued stabilization of his ischemic cardiomyopathy and chronic systolic heart failure with consideration of substitution of alternative therapy on an outpatient basis following further stabilization of his overall medical status including that of his peritonitis. The ultimate decision will be deferred to his primary care provider with needs of careful monitoring of his blood pressure as well as that of renal function and electrolytes independent of the choice of this component of his medical management. Continued aggressive risk factor modification of blood pressure, diabetes and serum lipids will be essential to reducing risk of future atherosclerotic development. Continued management of his peritonitis and alcoholic cirrhosis/ascites will be deferred to the infectious disease and gastroenterology services. With plans of discharge later today we will further evaluate him on an outpatient basis with arrangements made for follow-up with his primary care physician, [facility] within 1 week of hospital discharge.

## 2020-01-10 NOTE — PROGRESS NOTES
Patient may be discharge home with instruction NOT to start entresto. He is to take lisinopril 2.5 mg daily as per cardiology recommendation.

## 2020-01-10 NOTE — DISCHARGE SUMMARY
Orderable       Gram stain performed on unspun fluid  Few Polymorphonuclear leukocytes  Epithelial cells not seen  No organisms seen     BODY FLUID CULTURE   Result Value Ref Range    Gram Stain Result Refer to ordered Gram stain for results     Organism Escherichia coli (A)     Body Fluid Culture, Sterile Moderate growth        Susceptibility    Escherichia coli - BACTERIAL SUSCEPTIBILITY PANEL BY SUSAN     ampicillin <=^2 Sensitive mcg/mL     ceFAZolin <=^4 Sensitive mcg/mL     cefepime <=^0.12 Sensitive mcg/mL     cefTRIAXone <=^0.25 Sensitive mcg/mL     Confirmatory Extended Spectrum Beta-Lactamase Neg  mcg/mL     ertapenem <=^0.12 Sensitive mcg/mL     gentamicin <=^1 Sensitive mcg/mL     levofloxacin <=^0.12 Sensitive mcg/mL     piperacillin-tazobactam <=^4 Sensitive mcg/mL     trimethoprim-sulfamethoxazole <=^20 Sensitive mcg/mL   Culture Blood #1   Result Value Ref Range    Blood Culture, Routine 5 Days- no growth    Culture Blood #2   Result Value Ref Range    Culture, Blood 2 5 Days- no growth    CBC auto differential   Result Value Ref Range    WBC 20.9 (H) 4.5 - 11.5 E9/L    RBC 4.18 3.80 - 5.80 E12/L    Hemoglobin 12.6 12.5 - 16.5 g/dL    Hematocrit 36.6 (L) 37.0 - 54.0 %    MCV 87.6 80.0 - 99.9 fL    MCH 30.1 26.0 - 35.0 pg    MCHC 34.4 32.0 - 34.5 %    RDW 13.8 11.5 - 15.0 fL    Platelets 968 044 - 668 E9/L    MPV 10.5 7.0 - 12.0 fL    Neutrophils % 87.6 (H) 43.0 - 80.0 %    Immature Granulocytes % 1.1 0.0 - 5.0 %    Lymphocytes % 4.6 (L) 20.0 - 42.0 %    Monocytes % 6.5 2.0 - 12.0 %    Eosinophils % 0.0 0.0 - 6.0 %    Basophils % 0.2 0.0 - 2.0 %    Neutrophils Absolute 18.31 (H) 1.80 - 7.30 E9/L    Immature Granulocytes # 0.23 E9/L    Lymphocytes Absolute 0.97 (L) 1.50 - 4.00 E9/L    Monocytes Absolute 1.35 (H) 0.10 - 0.95 E9/L    Eosinophils Absolute 0.00 (L) 0.05 - 0.50 E9/L    Basophils Absolute 0.05 0.00 - 0.20 E9/L   Comprehensive Metabolic Panel   Result Value Ref Range    Sodium 135 132 - 146 mmol/L    Potassium 4.1 3.5 - 5.0 mmol/L    Chloride 95 (L) 98 - 107 mmol/L    CO2 26 22 - 29 mmol/L    Anion Gap 14 7 - 16 mmol/L    Glucose 114 (H) 74 - 99 mg/dL    BUN 51 (H) 8 - 23 mg/dL    CREATININE 2.0 (H) 0.7 - 1.2 mg/dL    GFR Non-African American 32 >=60 mL/min/1.73    GFR African American 39     Calcium 8.8 8.6 - 10.2 mg/dL    Total Protein 6.1 (L) 6.4 - 8.3 g/dL    Alb 3.0 (L) 3.5 - 5.2 g/dL    Total Bilirubin 3.2 (H) 0.0 - 1.2 mg/dL    Alkaline Phosphatase 88 40 - 129 U/L    ALT 18 0 - 40 U/L    AST 26 0 - 39 U/L   Ammonia   Result Value Ref Range    Ammonia 11.2 (L) 16.0 - 60.0 umol/L   Protime-INR   Result Value Ref Range    Protime 20.6 (H) 9.3 - 12.4 sec    INR 1.8    APTT   Result Value Ref Range    aPTT 36.4 (H) 24.5 - 35.1 sec   Urinalysis, reflex to microscopic   Result Value Ref Range    Color, UA ZAY (A) Straw/Yellow    Clarity, UA Clear Clear    Glucose, Ur Negative Negative mg/dL    Bilirubin Urine SMALL (A) Negative    Ketones, Urine Negative Negative mg/dL    Specific Gravity, UA 1.025 1.005 - 1.030    Blood, Urine Negative Negative    pH, UA 5.0 5.0 - 9.0    Protein, UA Negative Negative mg/dL    Urobilinogen, Urine 0.2 <2.0 E.U./dL    Nitrite, Urine Negative Negative    Leukocyte Esterase, Urine Negative Negative   BODY FLUID CELL COUNT WITH DIFFERENTIAL   Result Value Ref Range    Cell Count Fluid Type Ascites     Color, Fluid Yellow     Appearance, Fluid Hazy     Nucl Cell, Fluid 18,846 /uL    RBC, Fluid 14,000 /uL    Neutrophil Count, Fluid 78 %    Monocyte Count, Fluid 22 %   LACTATE DEHYDROGENASE, BODY FLUID   Result Value Ref Range    LD, Fluid 938 Not Established U/L    Fluid Type Ascites    Brain Natriuretic Peptide   Result Value Ref Range    Pro-BNP 16,685 (H) 0 - 450 pg/mL   Troponin   Result Value Ref Range    Troponin 0.04 (H) 0.00 - 0.03 ng/mL   CBC   Result Value Ref Range    WBC 18.9 (H) 4.5 - 11.5 E9/L    RBC 3.36 (L) 3.80 - 5.80 E12/L    Hemoglobin 10.2 (L) 12.5 - 16.5 g/dL    Hematocrit 29.1 (L) 37.0 - 54.0 %    MCV 86.6 80.0 - 99.9 fL    MCH 30.4 26.0 - 35.0 pg    MCHC 35.1 (H) 32.0 - 34.5 %    RDW 13.7 11.5 - 15.0 fL    Platelets 782 231 - 026 E9/L    MPV 10.4 7.0 - 12.0 fL   Comprehensive metabolic panel   Result Value Ref Range    Sodium 135 132 - 146 mmol/L    Potassium 3.8 3.5 - 5.0 mmol/L    Chloride 98 98 - 107 mmol/L    CO2 23 22 - 29 mmol/L    Anion Gap 14 7 - 16 mmol/L    Glucose 111 (H) 74 - 99 mg/dL    BUN 57 (H) 8 - 23 mg/dL    CREATININE 1.7 (H) 0.7 - 1.2 mg/dL    GFR Non-African American 39 >=60 mL/min/1.73    GFR African American 47     Calcium 8.1 (L) 8.6 - 10.2 mg/dL    Total Protein 4.9 (L) 6.4 - 8.3 g/dL    Alb 2.6 (L) 3.5 - 5.2 g/dL    Total Bilirubin 2.0 (H) 0.0 - 1.2 mg/dL    Alkaline Phosphatase 74 40 - 129 U/L    ALT 18 0 - 40 U/L    AST 29 0 - 39 U/L   Protime-INR   Result Value Ref Range    Protime 20.9 (H) 9.3 - 12.4 sec    INR 1.7    Ammonia   Result Value Ref Range    Ammonia 17.2 16.0 - 60.0 umol/L   Protime-INR   Result Value Ref Range    Protime 18.8 (H) 9.3 - 12.4 sec    INR 1.6    CBC   Result Value Ref Range    WBC 14.5 (H) 4.5 - 11.5 E9/L    RBC 3.00 (L) 3.80 - 5.80 E12/L    Hemoglobin 8.9 (L) 12.5 - 16.5 g/dL    Hematocrit 25.9 (L) 37.0 - 54.0 %    MCV 86.3 80.0 - 99.9 fL    MCH 29.7 26.0 - 35.0 pg    MCHC 34.4 32.0 - 34.5 %    RDW 13.6 11.5 - 15.0 fL    Platelets 648 609 - 371 E9/L    MPV 10.5 7.0 - 12.0 fL   Comprehensive metabolic panel   Result Value Ref Range    Sodium 136 132 - 146 mmol/L    Potassium 3.6 3.5 - 5.0 mmol/L    Chloride 98 98 - 107 mmol/L    CO2 23 22 - 29 mmol/L    Anion Gap 15 7 - 16 mmol/L    Glucose 115 (H) 74 - 99 mg/dL    BUN 67 (H) 8 - 23 mg/dL    CREATININE 1.6 (H) 0.7 - 1.2 mg/dL    GFR Non-African American 42 >=60 mL/min/1.73    GFR African American 50     Calcium 8.2 (L) 8.6 - 10.2 mg/dL    Total Protein 5.0 (L) 6.4 - 8.3 g/dL    Alb 2.8 (L) 3.5 - 5.2 g/dL    Total Bilirubin 1.9 (H) 0.0 - 1.2 mg/dL Alkaline Phosphatase 61 40 - 129 U/L    ALT 17 0 - 40 U/L    AST 29 0 - 39 U/L   Alpha-1-antitrypsin   Result Value Ref Range    A-1 Antitrypsin 225 (H) 90 - 200 mg/dL   Ferritin   Result Value Ref Range    Ferritin 588 ng/mL   Protein, urine, 24 hour   Result Value Ref Range    24hr Urine Volume (ml) 725 mL    Creatinine, 24H Ur 602 (L) 980 - 2200 mg/24h    Protein, 24H Urine 0.07 0.00 - 0.14 g/24hr    Collection Duration 24 Hours   Protime-INR   Result Value Ref Range    Protime 23.5 (H) 9.3 - 12.4 sec    INR 2.0    CBC   Result Value Ref Range    WBC 16.2 (H) 4.5 - 11.5 E9/L    RBC 3.49 (L) 3.80 - 5.80 E12/L    Hemoglobin 10.4 (L) 12.5 - 16.5 g/dL    Hematocrit 30.5 (L) 37.0 - 54.0 %    MCV 87.4 80.0 - 99.9 fL    MCH 29.8 26.0 - 35.0 pg    MCHC 34.1 32.0 - 34.5 %    RDW 13.6 11.5 - 15.0 fL    Platelets 692 039 - 368 E9/L    MPV 10.2 7.0 - 12.0 fL   Comprehensive metabolic panel   Result Value Ref Range    Sodium 136 132 - 146 mmol/L    Potassium 3.7 3.5 - 5.0 mmol/L    Chloride 98 98 - 107 mmol/L    CO2 23 22 - 29 mmol/L    Anion Gap 15 7 - 16 mmol/L    Glucose 155 (H) 74 - 99 mg/dL    BUN 58 (H) 8 - 23 mg/dL    CREATININE 1.5 (H) 0.7 - 1.2 mg/dL    GFR Non-African American 45 >=60 mL/min/1.73    GFR African American 54     Calcium 8.6 8.6 - 10.2 mg/dL    Total Protein 5.8 (L) 6.4 - 8.3 g/dL    Alb 3.4 (L) 3.5 - 5.2 g/dL    Total Bilirubin 1.7 (H) 0.0 - 1.2 mg/dL    Alkaline Phosphatase 65 40 - 129 U/L    ALT 18 0 - 40 U/L    AST 28 0 - 39 U/L   Protime-INR   Result Value Ref Range    Protime 26.4 (H) 9.3 - 12.4 sec    INR 2.2    Comprehensive metabolic panel   Result Value Ref Range    Sodium 133 132 - 146 mmol/L    Potassium 3.6 3.5 - 5.0 mmol/L    Chloride 98 98 - 107 mmol/L    CO2 24 22 - 29 mmol/L    Anion Gap 11 7 - 16 mmol/L    Glucose 130 (H) 74 - 99 mg/dL    BUN 53 (H) 8 - 23 mg/dL    CREATININE 1.3 (H) 0.7 - 1.2 mg/dL    GFR Non-African American 53 >=60 mL/min/1.73    GFR African American >60 Calcium 8.6 8.6 - 10.2 mg/dL    Total Protein 5.3 (L) 6.4 - 8.3 g/dL    Alb 2.9 (L) 3.5 - 5.2 g/dL    Total Bilirubin 1.6 (H) 0.0 - 1.2 mg/dL    Alkaline Phosphatase 65 40 - 129 U/L    ALT 18 0 - 40 U/L    AST 28 0 - 39 U/L   CBC Auto Differential   Result Value Ref Range    WBC 16.1 (H) 4.5 - 11.5 E9/L    RBC 3.26 (L) 3.80 - 5.80 E12/L    Hemoglobin 9.6 (L) 12.5 - 16.5 g/dL    Hematocrit 28.4 (L) 37.0 - 54.0 %    MCV 87.1 80.0 - 99.9 fL    MCH 29.4 26.0 - 35.0 pg    MCHC 33.8 32.0 - 34.5 %    RDW 13.6 11.5 - 15.0 fL    Platelets 769 157 - 454 E9/L    MPV 10.7 7.0 - 12.0 fL    Neutrophils % 84.2 (H) 43.0 - 80.0 %    Immature Granulocytes % 1.1 0.0 - 5.0 %    Lymphocytes % 6.1 (L) 20.0 - 42.0 %    Monocytes % 8.3 2.0 - 12.0 %    Eosinophils % 0.2 0.0 - 6.0 %    Basophils % 0.1 0.0 - 2.0 %    Neutrophils Absolute 13.54 (H) 1.80 - 7.30 E9/L    Immature Granulocytes # 0.17 E9/L    Lymphocytes Absolute 0.98 (L) 1.50 - 4.00 E9/L    Monocytes Absolute 1.33 (H) 0.10 - 0.95 E9/L    Eosinophils Absolute 0.04 (L) 0.05 - 0.50 E9/L    Basophils Absolute 0.01 0.00 - 0.20 E9/L   Protime-INR   Result Value Ref Range    Protime 27.4 (H) 9.3 - 12.4 sec    INR 2.3    Comprehensive metabolic panel   Result Value Ref Range    Sodium 135 132 - 146 mmol/L    Potassium 3.4 (L) 3.5 - 5.0 mmol/L    Chloride 99 98 - 107 mmol/L    CO2 24 22 - 29 mmol/L    Anion Gap 12 7 - 16 mmol/L    Glucose 128 (H) 74 - 99 mg/dL    BUN 51 (H) 8 - 23 mg/dL    CREATININE 1.3 (H) 0.7 - 1.2 mg/dL    GFR Non-African American 53 >=60 mL/min/1.73    GFR African American >60     Calcium 8.5 (L) 8.6 - 10.2 mg/dL    Total Protein 5.3 (L) 6.4 - 8.3 g/dL    Alb 2.8 (L) 3.5 - 5.2 g/dL    Total Bilirubin 1.3 (H) 0.0 - 1.2 mg/dL    Alkaline Phosphatase 65 40 - 129 U/L    ALT 22 0 - 40 U/L    AST 29 0 - 39 U/L   CBC Auto Differential   Result Value Ref Range    WBC 15.3 (H) 4.5 - 11.5 E9/L    RBC 3.14 (L) 3.80 - 5.80 E12/L    Hemoglobin 9.4 (L) 12.5 - 16.5 g/dL Hematocrit 27.6 (L) 37.0 - 54.0 %    MCV 87.9 80.0 - 99.9 fL    MCH 29.9 26.0 - 35.0 pg    MCHC 34.1 32.0 - 34.5 %    RDW 13.8 11.5 - 15.0 fL    Platelets 254 565 - 678 E9/L    MPV 10.8 7.0 - 12.0 fL    Neutrophils % 83.5 (H) 43.0 - 80.0 %    Immature Granulocytes % 1.1 0.0 - 5.0 %    Lymphocytes % 6.5 (L) 20.0 - 42.0 %    Monocytes % 8.5 2.0 - 12.0 %    Eosinophils % 0.3 0.0 - 6.0 %    Basophils % 0.1 0.0 - 2.0 %    Neutrophils Absolute 12.79 (H) 1.80 - 7.30 E9/L    Immature Granulocytes # 0.17 E9/L    Lymphocytes Absolute 1.00 (L) 1.50 - 4.00 E9/L    Monocytes Absolute 1.30 (H) 0.10 - 0.95 E9/L    Eosinophils Absolute 0.04 (L) 0.05 - 0.50 E9/L    Basophils Absolute 0.02 0.00 - 0.20 E9/L   EKG 12 Lead   Result Value Ref Range    Ventricular Rate 83 BPM    Atrial Rate 55 BPM    QRS Duration 110 ms    Q-T Interval 344 ms    QTc Calculation (Bazett) 404 ms    R Axis -45 degrees    T Axis 76 degrees       Radiology:  Ct Abdomen Pelvis Wo Contrast Additional Contrast? None    Result Date: 2020  Patient MRN:  [id number] : 1935 Age: 82 years Gender: Male Order Date:  2020 1:30 PM EXAM: CT ABDOMEN PELVIS WO CONTRAST NUMBER OF IMAGES \ views:  12 INDICATION:  abdominal pain abdominal pain COMPARISON: None Technique: Multiple computerized tomography sections of the abdomen with sagittal and coronal MPR reconstructions were obtained from the top of the diaphragm to the pelvis. LOWER THORAX: Trace effusions with posterobasilar atelectasis. Calcified granulomas within the lung bases. Cardiomegaly and anemia. Partially visualized intracardiac leads. Coronary artery calcifications. ABDOMEN/PELVIS Liver: Nodular contours of the liver with several hypodensities. Hypodensity in the gallbladder fossa which is difficult to differentiate from the gallbladder and gallbladder wall. Gallbladder: Diffusely thickened with gallstones. It is difficult to differentiate the gallbladder wall superiorly from the liver.  Spleen:

## 2020-01-10 NOTE — FLOWSHEET NOTE
I discussed advance care planning with the patient. The patient is not interested in doing advance directives or any form of advance care planning. In addition, he has nothing to share with the staff regarding his beliefs and values.

## 2020-01-10 NOTE — CARE COORDINATION
CASE MANAGEMENT. .. Discharge order on the chart. Confirmed with the patient and Mrs Jessica Soto that there are no needs. He will have a ride after 2pm today. Nursing updated.

## 2020-01-11 NOTE — PROGRESS NOTES
01/10/2020    AST 29 01/10/2020    ALKPHOS 65 01/10/2020    BILITOT 1.3 (H) 01/10/2020     Lab Results   Component Value Date     01/10/2020    K 3.4 01/10/2020    K 4.5 09/23/2019    CL 99 01/10/2020    CO2 24 01/10/2020    BUN 51 01/10/2020    CREATININE 1.3 01/10/2020    GFRAA >60 01/10/2020    LABGLOM 53 01/10/2020    GLUCOSE 128 01/10/2020    GLUCOSE 110 05/30/2012    PROT 5.3 01/10/2020    LABALBU 2.8 01/10/2020    LABALBU 4.2 05/30/2012    CALCIUM 8.5 01/10/2020    BILITOT 1.3 01/10/2020    ALKPHOS 65 01/10/2020    AST 29 01/10/2020    ALT 22 01/10/2020     Radiology:  Reviewed     Microbiology:   1/5/2020- body fluid-e.coli - pan sensitive   1/5/2020-Blood cx- no growth to date    ASSESSMENT:  · Spontaneous bacterial peritonitis -E. coli   · E.coli dysemia  · Leukocytosis - improving   · History of C.diff infection     PLAN:  · Rocephin IV to [address] for discharge  · Check cultures  · Monitor labs    Antelmo Sanabria MD  7:25 PM  1/10/2020

## 2020-01-20 NOTE — PROGRESS NOTES
change in medications. 32   Echocardiogram 09/02/2019: Borderline dilated left ventricle. Septal motion consistent with post cardiac surgery. Regional wall motion abnormalities with inferior and inferoseptal hypokinesis. The left atrium is severely dilated. Moderate mitral regurgitation is present. The aortic valve appears mildly sclerotic. Moderate tricuspid regurgitation. Pulmonary hypertension is mild. 33     Ascites s/p Paracentesis 01/05/2019 with 6350mL of cloudly dark yellow fluid removed. 34    admitted for dizziness and fall 01/2020  He had repeat paracentesis 01/05/2020. Total volume 6350. He was evaluated by Dr. Jojo Sutton 01/06/2020 for for abdominal pain. BUN was 51 with creatinine 2.0. WBC was 20,000 he received antibiotics for gram-negative sepsis associated with spontaneous bacterial peritonitis     Review of Systems:  Constitutional: negative for fever and chills  Respiratory: negative for cough and hemoptysis  Cardiovascular:   Gastrointestinal: negative for abdominal pain, diarrhea, nausea and vomiting  Genitourinary:negative for dysuria and hematuria  Derm: negative for rash and skin lesion(s)  Neurological: negative for seizures and tremors  Endocrine: negative for diabetic symptoms including polydipsia and polyuria  Musculoskeletal: negative for CTD  Psychiatric: negative for psychosis and major depression    On examination, he is a chronically ill emaciated appearing elderly man. Skin is warm and dry. Respirations are unlabored. There were no vitals taken for this visit. Franchesca Pih HEENT negative for scleral icterus. Extraocular muscles intact. No facial asymmetry or central cyanosis. Neck without masses or goiter. No bruit or JVD. Cardiac apex not displaced. Apical grade 2 murmur of mitral regurgitation   rhythm regular. Abdomen normal.  Extremities 2+ edema ankles edema. EKG today shows diffuse low QRS voltage.   Atrial mechanism indeterminant, probably fibrillation    The extended release tablet, Take 1 tablet by mouth 2 times daily, Disp: 180 tablet, Rfl: 3    vitamin D3 (CHOLECALCIFEROL) 1000 UNITS TABS tablet, Take 1 tablet by mouth daily. , Disp: 30 tablet, Rfl: 0      Note: This report was completed utilizing computer voice recognition software. Every effort has been made to ensure accuracy, however; inadvertent computerized transcription errors may be present. Lucina Soto.  Eber Dobbs MD     Cc Dr Priyanka Gaming

## 2020-02-03 NOTE — PROGRESS NOTES
[facility]  [facility]. Pamela Pacheco M.D. Alena Ryan M.D.  Myriam Phillips. [employer], M.D. Javan Forman M.D. Elaine Ryan M.D. Nahun Perdomo MD                [address] [place] [address].   1935  Alice Soni, APRN - CNP      This 63-year-old man is seen today for outpatient cardiac follow-up. He has a history of CHF with depressed EF, atrial fibrillation, diabetes and cirrhosis/ascites. He has required large-volume paracenteses for symptom relief. This has been problematic because of his hypotension. At the time of his most recent hospital discharge he reports being told not to use a diuretic. Today he is having dyspnea when walking down the hallway and is having increased abdominal girth as well as ankle edema. He is scheduled to see Dr. Sera Saeed in March. Medical History:  1. Hypertension. 2. Chronic CHF with depressed EF. 3. Hyperlipidemia. Total cholesterol 114, triglycerides 115, HDL 31, LDL 60 (17)  4. Chronic AF, . Chronic Coumdin  5. Diabetes mellitus. 6. Remote MI history, . 7. Family History: Mother had stroke and father pneumonia.  at 80 and 80 respectively. 8. Cigarette abuse, 60 pack years. Quit .  9. ICD implant, 2012 (Dr. Ruthy Kenyon, [facility]). 10. Chronic anticoagulation with Coumadin. 11. History of MI, . 12. Cardiac cath, 2003 - [facility]. LM 25%. LAD 40%. Proximal LAD 40%. Diagonal 95%. CX mild plaque. RCA mild plaque. LV hypokinesis anterolateral wall. EF normal. Medical therapy. 13. PCI, 2008. Stent RCA. 14. Cardiac cath, 2010. Moderately severe single vessel disease LAD. Status post PCI distal RCA. Recommend IVUS and possible rotational atherectomy and stenting of the mid LAD. 15. Dyslipidemia. 16. Lexiscan MPS 10/17/2016. EF 26%. Akinesis inferoapical and distal anterior segments. Large fixed inferior defect consistent with scar. Moderately severe mid anterior reversible defect. 17. Echo 10/17/2016. Dilated LV. EF 20%. AF. Biatrial dilatation. Mild PHTN. 18. PRO-BNP 10/26/2016 1265. 19. Cardiac OP follow up 11/22/2016. /60. Symptomatic improvement. 20. Echocardiogram, 01/23/2017, EF biplane = 44%.  Diffuse hypokinesis.  Diastolic dysfunction.  Atrial fibrillation.    21. Echocardiogram, 08/08/2017. 22. Labs, 08/16/2017. Sodium and potassium normal. BUN 19, creatinine 1.1. Pro BNP 1271. 23. Labs, 11/13/2017. BUN 23 creatinine 1.2. GFR 58.  24. OP cardiac assessment, 12/01/2017. Asymptomatic. No edema. 25. Device interrogation, [facility], 12/2017. Battery status normal and shows no significant depletion. Counters since 09/22/2017 6 NSVT noted. Sensing appropriate. Lead impedance trends normal.  V pacing 3%. Continue 3 month remotes and yearly in-clinic visit. 26. Echo, 01/08/2019. EF 56%. Left atrium severely dilated. Atrial fibrillation. Mild to moderate MR. Pulmonary hypertension is mild. [facility] admission for septic shock 08/2019. with anasarca/ascites, rx Recephin 2 g IV daily. ID, Rx liver bx, and holding coumadin and lasix Had  paracentesis 7208975/03/19  28. Hospitalized 09/23 rough 09/26 with generalized weakness. BUN = 40 creatinine= 2.0  proBNP= 4184. 29. Labs, 09/26/2019. BUN 28, Creatinine 1.4, K 38.  30. Outpatient follow-up, 10/02/2019. Lisinopril 5 mg daily resumed. 31. Outpatient cardiac assessment, 12/05/2019. Stable. No change in medications. 32. Echocardiogram, 09/02/2019. Borderline dilated left ventricle. Septal motion consistent with post cardiac surgery. Regional wall motion abnormalities with inferior and inferoseptal hypokinesis. The left atrium is severely dilated. Moderate mitral regurgitation is present. The aortic valve appears mildly sclerotic. Moderate tricuspid regurgitation. Pulmonary hypertension is mild. 33. Ascites s/p Paracentesis 01/05/2019 with 6350mL of cloudly dark yellow fluid removed.    34. Admitted for dizziness and fall 01/2020  He had repeat paracentesis 01/05/2020. Total volume 6350. He was evaluated by Dr. Jenifer Rowland 01/06/2020 for for abdominal pain. BUN was 51 with creatinine 2.0. WBC was 20,000 he received antibiotics for gram-negative sepsis associated with spontaneous bacterial peritonitis   35. Outpatient cardiac evaluation 02/05/2020: Increasing dyspnea and edema. Bumex 1 mg p.o. prescribed    Review of Systems:  Constitutional: negative for fever and chills  Respiratory: negative for cough and hemoptysis  Cardiovascular:   Gastrointestinal: negative for abdominal pain, diarrhea, nausea and vomiting  Genitourinary:negative for dysuria and hematuria  Derm: negative for rash and skin lesion(s)  Neurological: negative for seizures and tremors  Endocrine: negative for diabetic symptoms including polydipsia and polyuria  Musculoskeletal: negative for CTD  Psychiatric: negative for psychosis and major depression    On examination, he is a chronically ill-appearing elderly  male in no distress. Skin is warm and dry. Respirations are unlabored. There were no vitals taken for this visit. Elzie Bienenstock HEENT negative for scleral icterus. Extraocular muscles intact. 3-4 pitting ankle edema bilaterally edema. Breath sounds diminished at the bases    EKG today shows atrial fibrillation with ventricular rate 80. Diffuse low QRS voltage. RBBB    The patient is having recurrent ascites/anasarca. Today Bumex 1 mg is started daily. He will be seen back in the office in 5-6 days. Unfortunately he is at elevated risk for decompensation and this was discussed with the patient and wife. There should be a low threshold for him to go to the emergency room.   They report they are scheduled to be seen at Dr. Hari Oh office in the next 2 days    At completion of today's visit, medications include the following:    Current Outpatient Medications:     Megestrol Acetate (MEGACE ORAL PO), Take 10 mLs by mouth, Disp: , Rfl:    lisinopril (PRINIVIL;ZESTRIL) 5 MG tablet, Take 0.5 tablets by mouth daily (Patient not taking: Reported on 1/22/2020), Disp: 15 tablet, Rfl: 0    lactobacillus (CULTURELLE) CAPS capsule, Take 2 capsules by mouth every 12 hours, Disp: 100 capsule, Rfl: 3    carvedilol (COREG) 6.25 MG tablet, Take 1 tablet by mouth 2 times daily (with meals) (Patient taking differently: Take 12.5 mg by mouth 2 times daily (with meals) ), Disp: 60 tablet, Rfl: 3    spironolactone (ALDACTONE) 25 MG tablet, Take 1 tablet by mouth daily (Patient taking differently: Take 12.5 mg by mouth daily ), Disp: 30 tablet, Rfl: 3    ONE DAILY MULTIPLE VITAMIN PO, Take 1 tablet by mouth daily, Disp: , Rfl:     bumetanide (BUMEX) 1 MG tablet, 0.5 mg every other day , Disp: , Rfl:     blood glucose monitor kit and supplies, Test 1x  times a day for blood sugar E11.8 Glucometer and supplies that are covered by his insurance (Patient not taking: Reported on 1/22/2020), Disp: 1 kit, Rfl: 0    blood glucose monitor strips, Test 1x  times a day for blood sugar E11.8 Glucometer and supplies that are covered by his insurance (Patient not taking: Reported on 1/22/2020), Disp: 100 strip, Rfl: 3    Lancet Device MISC, Test 1x  times a day for blood sugar E11.8 Glucometer and supplies that are covered by his insurance (Patient not taking: Reported on 1/22/2020), Disp: 100 Device, Rfl: 3    warfarin (COUMADIN) 5 MG tablet, Take 5 mg by mouth See Admin Instructions On Monday and Friday and 7.5mg the rest of the week, Disp: , Rfl:     digoxin (LANOXIN) 125 MCG tablet, TAKE 1 TABLET DAILY (Patient not taking: Reported on 1/22/2020), Disp: 90 tablet, Rfl: 2    ranolazine (RANEXA) 500 MG extended release tablet, Take 1 tablet by mouth 2 times daily, Disp: 180 tablet, Rfl: 3    vitamin D3 (CHOLECALCIFEROL) 1000 UNITS TABS tablet, Take 1 tablet by mouth daily. , Disp: 30 tablet, Rfl: 0      Note: This report was completed utilizing computer voice recognition software. Every effort has been made to ensure accuracy, however; inadvertent computerized transcription errors may be present. Robert Pollack.  Leeann Lombardi MD

## 2020-02-17 NOTE — PROGRESS NOTES
severe single vessel disease LAD. Status post PCI distal RCA. Recommend IVUS and possible rotational atherectomy and stenting of the mid LAD. 15. Dyslipidemia. 16. Lexiscan MPS 10/17/2016. EF 26%. Akinesis inferoapical and distal anterior segments. Large fixed inferior defect consistent with scar. Moderately severe mid anterior reversible defect. 17. Echo 10/17/2016. Dilated LV. EF 20%. AF. Biatrial dilatation. Mild PHTN. 18. PRO-BNP 10/26/2016 1265. 19. Cardiac OP follow up 11/22/2016. /60. Symptomatic improvement. 20. Echocardiogram, 01/23/2017, EF biplane = 44%.  Diffuse hypokinesis.  Diastolic dysfunction.  Atrial fibrillation.    21. Echocardiogram, 08/08/2017. 22. Labs, 08/16/2017. Sodium and potassium normal. BUN 19, creatinine 1.1. Pro BNP 1271. 23. Labs, 11/13/2017. BUN 23 creatinine 1.2. GFR 58.  24. OP cardiac assessment, 12/01/2017. Asymptomatic. No edema. 25. Device interrogation, [facility], 12/2017. Battery status normal and shows no significant depletion. Counters since 09/22/2017 6 NSVT noted. Sensing appropriate. Lead impedance trends normal.  V pacing 3%. Continue 3 month remotes and yearly in-clinic visit. 26. Echo, 01/08/2019. EF 56%. Left atrium severely dilated. Atrial fibrillation. Mild to moderate MR. Pulmonary hypertension is mild. 2544 [facility] admission for septic shock 08/2019. with anasarca/ascites, rx Recephin 2 g IV daily. ID, Rx liver bx, and holding coumadin and lasix Had  paracentesis [id number]/03/19  28. Hospitalized 09/23 rough 09/26 with generalized weakness. BUN = 40 creatinine= 2.0  proBNP= 4184. 29. Labs, 09/26/2019. BUN 28, Creatinine 1.4, K 38.  30. Outpatient follow-up, 10/02/2019. Lisinopril 5 mg daily resumed. 31. Outpatient cardiac assessment, 12/05/2019. Stable. No change in medications. 32. Echocardiogram, 09/02/2019. Borderline dilated left ventricle. Septal motion consistent with post cardiac surgery.  Regional wall motion abnormalities with

## 2020-02-19 NOTE — PROGRESS NOTES
1x  times a day for blood sugar E11.8 Glucometer and supplies that are covered by his insurance, Disp: 100 Device, Rfl: 3    warfarin (COUMADIN) 5 MG tablet, Take 5 mg by mouth daily 5mg mon and fri and 7mg the rest of the week, Disp: , Rfl:     ranolazine (RANEXA) 500 MG extended release tablet, Take 1 tablet by mouth 2 times daily, Disp: 180 tablet, Rfl: 3    vitamin D3 (CHOLECALCIFEROL) 1000 UNITS TABS tablet, Take 1 tablet by mouth daily. , Disp: 30 tablet, Rfl: 0    Megestrol Acetate (MEGACE ORAL PO), Take 10 mLs by mouth, Disp: , Rfl:     lisinopril (PRINIVIL;ZESTRIL) 5 MG tablet, Take 0.5 tablets by mouth daily (Patient not taking: Reported on 1/22/2020), Disp: 15 tablet, Rfl: 0    lactobacillus (CULTURELLE) CAPS capsule, Take 2 capsules by mouth every 12 hours (Patient not taking: Reported on 2/5/2020), Disp: 100 capsule, Rfl: 3    digoxin (LANOXIN) 125 MCG tablet, TAKE 1 TABLET DAILY (Patient not taking: Reported on 1/22/2020), Disp: 90 tablet, Rfl: 2      Note: This report was completed utilizing computer voice recognition software. Every effort has been made to ensure accuracy, however; inadvertent computerized transcription errors may be present. Oly Anderson.  Adelita Kussmaul, MD

## 2020-02-21 NOTE — PROGRESS NOTES
(17)  4. Chronic AF, . Chronic Coumdin  5. Diabetes mellitus. 6. Remote MI history, . 7. Family History: Mother had stroke and father pneumonia.  at 80 and 80 respectively. 8. Cigarette abuse, 60 pack years. Quit .  9. ICD implant, 2012 (Dr. Deepali Grigsby, [facility]). 10. Chronic anticoagulation with Coumadin. 11. History of MI, . 12. Cardiac cath, 2003 - [facility]. LM 25%. LAD 40%. Proximal LAD 40%. Diagonal 95%. CX mild plaque. RCA mild plaque. LV hypokinesis anterolateral wall. EF normal. Medical therapy. 13. PCI, . Stent RCA. 14. Cardiac cath, 2010. Moderately severe single vessel disease LAD. Status post PCI distal RCA. Recommend IVUS and possible rotational atherectomy and stenting of the mid LAD. 15. Dyslipidemia. 16. Lexiscan MPS 10/17/2016. EF 26%. Akinesis inferoapical and distal anterior segments. Large fixed inferior defect consistent with scar. Moderately severe mid anterior reversible defect. 17. Echo 10/17/2016. Dilated LV. EF 20%. AF. Biatrial dilatation. Mild PHTN. 18. PRO-BNP 10/26/2016 1265. 19. Cardiac OP follow up 2016. /60. Symptomatic improvement. 20. Echocardiogram, 2017, EF biplane = 44%.  Diffuse hypokinesis.  Diastolic dysfunction.  Atrial fibrillation.    21. Echocardiogram, 2017. 22. Labs, 2017. Sodium and potassium normal. BUN 19, creatinine 1.1. Pro BNP 1271. 23. Labs, 2017. BUN 23 creatinine 1.2. GFR 58.  24. OP cardiac assessment, 2017. Asymptomatic. No edema. 25. Device interrogation, [facility], 2017. Battery status normal and shows no significant depletion. Counters since 2017 6 NSVT noted. Sensing appropriate. Lead impedance trends normal.  V pacing 3%. Continue 3 month remotes and yearly in-clinic visit. 26. Echo, 2019. EF 56%. Left atrium severely dilated. Atrial fibrillation. Mild to moderate MR. Pulmonary hypertension is mild.   3744 [facility] admission for septic shock 08/2019. with anasarca/ascites, rx Recephin 2 g IV daily. ID, Rx liver bx, and holding coumadin and lasix Had  paracentesis [id number]/03/19  28. Hospitalized 09/23 rough 09/26 with generalized weakness. BUN = 40 creatinine= 2.0  proBNP= 4184. 29. Labs, 09/26/2019. BUN 28, Creatinine 1.4, K 38.  30. Outpatient follow-up, 10/02/2019. Lisinopril 5 mg daily resumed. 31. Outpatient cardiac assessment, 12/05/2019. Stable. No change in medications. 32. Echocardiogram, 09/02/2019. Borderline dilated left ventricle. Septal motion consistent with post cardiac surgery. Regional wall motion abnormalities with inferior and inferoseptal hypokinesis. The left atrium is severely dilated. Moderate mitral regurgitation is present. The aortic valve appears mildly sclerotic. Moderate tricuspid regurgitation. Pulmonary hypertension is mild. 33. Ascites s/p Paracentesis 01/05/2019 with 6350mL of cloudly dark yellow fluid removed. 34. Admitted for dizziness and fall 01/2020  He had repeat paracentesis 01/05/2020. Total volume 6350. He was evaluated by Dr. Doris Yi 01/06/2020 for for abdominal pain. BUN was 51 with creatinine 2.0. WBC was 20,000 he received antibiotics for gram-negative sepsis associated with spontaneous bacterial peritonitis   35. Outpatient cardiac evaluation 02/05/2020: Increasing dyspnea and edema. Bumex 1 mg p.o. prescribed  36. Hospital admission 01/05/2019 for weakness and falling. Progressive ascites. Paracentesis [id number]. Positive for E. Coli>>spontaneous bacterial ( Dr. Morgan Guo consult)  40. Outpatient cardiac follow-up,  02/18/2020. Bumex increased to 1 twice daily  38. Labs, 02/18/2020. K 4.4, BUN 40, creatinine 1.5.  39. Outpatient cardiac assessment, 02/20/2020. Subjective improvement with 6-8 pound weight loss. Diuretic continued.     Review of Systems:  Constitutional: negative for fever and chills  Respiratory: negative for cough and hemoptysis  Cardiovascular:   Gastrointestinal:

## 2020-03-05 NOTE — PROGRESS NOTES
[facility]  Hilda Rodriges. William Vergara M.D. Cherise Mon M.D.  Whit Lin. Kelly Brandt M.D. Tiffanie Frost M.D. Gianni Florian M.D. MD Carole Aggarwal Jr.   1935  Wolfedaisy Matthews, APRN - CNP      This 82-year-old man is seen for outpatient cardiac follow-up today. He has a history of cirrhosis with ascites and recurrent large-volume paracentesis. He has a history of hypertension, diabetes and depressed EF with ICD implant in  recently he has felt fairly well on large doses of loop diuretic and Aldactone. He has a history of low BP thus truncating guideline driven Rx for HFrEF.    [facility] has a history of diabetes, ischemic cardiomyopathy and cirrhosis. [facility] was hospitalized in September with weakness.  At that time BUN was 40 and creatinine 2.0 he had evidence for renal insufficiency and it was felt that this was related to volume depletion in the context of large volume paracentesis and loop diuretics.  He was admitted 2020.  He underwent a CT of the abdomen and then a paracentesis with removal of 6350mL of cloudy dark yellow fluid that was positive for Ecoli.  He was seen in consultation by Dr. Bailey Gay and felt to have spontaneous bacterial peritonitis. Medical History:  1. Hypertension. 2. Chronic CHF with depressed EF. 3. Hyperlipidemia. Total cholesterol 114, triglycerides 115, HDL 31, LDL 60 (17)  4. Chronic AF, . Chronic Coumdin  5. Diabetes mellitus. 6. Remote MI history, . 7. Family History: Mother had stroke and father pneumonia.  at 80 and 80 respectively. 8. Cigarette abuse, 60 pack years. Quit .  9. ICD implant, 2012 (Dr. Dalia Kraus, [facility]). 10. Chronic anticoagulation with Coumadin. 11. History of MI, . 12. Cardiac cath, 2003 - [facility]. LM 25%. LAD 40%. Proximal LAD 40%. Diagonal 95%. CX mild plaque. RCA mild plaque. LV hypokinesis anterolateral wall.  EF normal. Medical therapy. 13. PCI, 2008. Stent RCA. 14. Cardiac cath, 05/27/2010. Moderately severe single vessel disease LAD. Status post PCI distal RCA. Recommend IVUS and possible rotational atherectomy and stenting of the mid LAD. 15. Dyslipidemia. 16. Lexiscan MPS 10/17/2016. EF 26%. Akinesis inferoapical and distal anterior segments. Large fixed inferior defect consistent with scar. Moderately severe mid anterior reversible defect. 17. Echo 10/17/2016. Dilated LV. EF 20%. AF. Biatrial dilatation. Mild PHTN. 18. PRO-BNP 10/26/2016 1265. 19. Cardiac OP follow up 11/22/2016. /60. Symptomatic improvement. 20. Echocardiogram, 01/23/2017, EF biplane = 44%.  Diffuse hypokinesis.  Diastolic dysfunction.  Atrial fibrillation.    21. Echocardiogram, 08/08/2017. 22. Labs, 08/16/2017. Sodium and potassium normal. BUN 19, creatinine 1.1. Pro BNP 1271. 23. Labs, 11/13/2017. BUN 23 creatinine 1.2. GFR 58.  24. OP cardiac assessment, 12/01/2017. Asymptomatic. No edema. 25. Device interrogation, [facility], 12/2017. Battery status normal and shows no significant depletion. Counters since 09/22/2017 6 NSVT noted. Sensing appropriate. Lead impedance trends normal.  V pacing 3%. Continue 3 month remotes and yearly in-clinic visit. 26. Echo, 01/08/2019. EF 56%. Left atrium severely dilated. Atrial fibrillation. Mild to moderate MR. Pulmonary hypertension is mild. 2544 [facility] admission for septic shock 08/2019. with anasarca/ascites, rx Recephin 2 g IV daily. ID, Rx liver bx, and holding coumadin and lasix Had  paracentesis [id number]/03/19  28. Hospitalized 09/23 rough 09/26 with generalized weakness. BUN = 40 creatinine= 2.0  proBNP= 4184. 29. Labs, 09/26/2019. BUN 28, Creatinine 1.4, K 38.  30. Outpatient follow-up, 10/02/2019. Lisinopril 5 mg daily resumed. 31. Outpatient cardiac assessment, 12/05/2019. Stable. No change in medications. 32. Echocardiogram, 09/02/2019. Borderline dilated left ventricle.  Septal

## 2020-03-13 NOTE — TELEPHONE ENCOUNTER
Per Dr Miles Ceballos, tell patient his labs are fine, to stay on medication and he will see patient in 6-8 weeks. Left patient a message to call the office.

## 2020-04-14 NOTE — PROGRESS NOTES
[facility]  Mason Orourke. Melissa Nelson M.D. Keysha Squires M.D.  Preethi Gerber. Wray Carrel, M.D. Thornton Dills, Kasey Landau, M.D. Beckie West, Melany Litten, M.D. MD Mary Mendiola    [place] Katelynns, APRN - CNP       Cele Points. is a 82 y.o. male evaluated via telephone on 2020. Consent:  He and/or health care decision maker is aware that that he may receive a bill for this telephone service, depending on his insurance coverage, and has provided verbal consent to proceed: Yes    This 82-year-old man is evaluated via phone call today. He has a history of ascites, cirrhosis and dilated cardiomyopathy. He has a history of recurrent large-volume paracentesis. For the past several months he has done well with oral loop diuretic. He has not had recurrence of marked dependent edema or tense ascites. According to the patient's wife today he has been eating well. He had a brief chill yesterday but today feels well and is eating well. Review of Systems:  Constitutional: negative for fever and chills  Respiratory: negative for cough and hemoptysis  Cardiovascular:   Gastrointestinal: negative for abdominal pain, diarrhea, nausea and vomiting  Genitourinary:negative for dysuria and hematuria  Derm: negative for rash and skin lesion(s)  Neurological: negative for seizures and tremors  Endocrine: negative for diabetic symptoms including polydipsia and polyuria  Musculoskeletal: negative for CTD  Psychiatric: negative for psychosis and major depression        Medical History:  1. Hypertension. 2. Chronic CHF with depressed EF. 3. Hyperlipidemia. Total cholesterol 114, triglycerides 115, HDL 31, LDL 60 (17)  4. Chronic AF, 2008.  Chronic Coumdin  5. Diabetes mellitus. 6. Remote MI history, . 7. Family History: Mother had stroke and father pneumonia.  at 80 and 80 respectively. 8. Cigarette abuse, 60 pack years.  Quit .  9. ICD chronic high-dose loop diuretic, it is recommended that he have frequent monitoring with a BMP. This can be done at Dr. Sarwat Koehler office. He will have outpatient cardiac office follow-up in June or July. Documentation:  I communicated with the patient and/or health care decision maker about medications and clinical status (see above). Details of this discussion including any medical advice provided:       I AFFIRM this is a Patient Initiated Episode with an Established Patient who has not had a related appointment within my department in the past 7 days or scheduled within the next 24 hours. Total Time: minutes: 11-20 minutes    Note: not billable if this call serves to triage the patient into an appointment for the relevant concern      At completion of today's visit, medications include the following:    Current Outpatient Medications:     bumetanide (BUMEX) 1 MG tablet, Take 1 tablet by mouth 2 times daily, Disp: 60 tablet, Rfl: 5    carvedilol (COREG) 6.25 MG tablet, Take 1 tablet by mouth 2 times daily (with meals) (Patient taking differently: Take 12.5 mg by mouth 2 times daily (with meals) ), Disp: 60 tablet, Rfl: 3    spironolactone (ALDACTONE) 25 MG tablet, Take 1 tablet by mouth daily (Patient taking differently: Take 12.5 mg by mouth daily ), Disp: 30 tablet, Rfl: 3    ONE DAILY MULTIPLE VITAMIN PO, Take 1 tablet by mouth daily, Disp: , Rfl:     warfarin (COUMADIN) 5 MG tablet, Take 5 mg by mouth daily 5mg mon and fri and 7mg the rest of the week, Disp: , Rfl:     ranolazine (RANEXA) 500 MG extended release tablet, Take 1 tablet by mouth 2 times daily, Disp: 180 tablet, Rfl: 3    vitamin D3 (CHOLECALCIFEROL) 1000 UNITS TABS tablet, Take 1 tablet by mouth daily. , Disp: 30 tablet, Rfl: 0    Megestrol Acetate (MEGACE ORAL PO), Take 10 mLs by mouth, Disp: , Rfl:     lisinopril (PRINIVIL;ZESTRIL) 5 MG tablet, Take 0.5 tablets by mouth daily (Patient not taking: Reported on 1/22/2020),